# Patient Record
Sex: FEMALE | Race: OTHER | HISPANIC OR LATINO | URBAN - METROPOLITAN AREA
[De-identification: names, ages, dates, MRNs, and addresses within clinical notes are randomized per-mention and may not be internally consistent; named-entity substitution may affect disease eponyms.]

---

## 2017-12-16 ENCOUNTER — EMERGENCY (EMERGENCY)
Facility: HOSPITAL | Age: 57
LOS: 1 days | Discharge: HOME | End: 2017-12-16
Attending: EMERGENCY MEDICINE | Admitting: EMERGENCY MEDICINE
Payer: COMMERCIAL

## 2017-12-16 VITALS
OXYGEN SATURATION: 97 % | HEART RATE: 79 BPM | DIASTOLIC BLOOD PRESSURE: 87 MMHG | TEMPERATURE: 98 F | RESPIRATION RATE: 18 BRPM | SYSTOLIC BLOOD PRESSURE: 148 MMHG
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
## 2017-12-16 DIAGNOSIS — Z98.891 HISTORY OF UTERINE SCAR FROM PREVIOUS SURGERY: Chronic | ICD-10-CM

## 2017-12-16 LAB
ALBUMIN SERPL ELPH-MCNC: 4.5 G/DL — SIGNIFICANT CHANGE UP (ref 3.3–5.2)
ALP SERPL-CCNC: 85 U/L — SIGNIFICANT CHANGE UP (ref 40–120)
ALT FLD-CCNC: 48 U/L — HIGH
ANION GAP SERPL CALC-SCNC: 15 MMOL/L — SIGNIFICANT CHANGE UP (ref 5–17)
APTT BLD: 35.4 SEC — SIGNIFICANT CHANGE UP (ref 27.5–37.4)
AST SERPL-CCNC: 38 U/L — HIGH
BASE EXCESS BLDV CALC-SCNC: 2.8 MMOL/L — HIGH (ref -2–2)
BASOPHILS # BLD AUTO: 0 K/UL — SIGNIFICANT CHANGE UP (ref 0–0.2)
BASOPHILS NFR BLD AUTO: 0.1 % — SIGNIFICANT CHANGE UP (ref 0–2)
BILIRUB SERPL-MCNC: 0.3 MG/DL — LOW (ref 0.4–2)
BUN SERPL-MCNC: 13 MG/DL — SIGNIFICANT CHANGE UP (ref 8–20)
CA-I SERPL-SCNC: 1.15 MMOL/L — SIGNIFICANT CHANGE UP (ref 1.15–1.33)
CALCIUM SERPL-MCNC: 9.5 MG/DL — SIGNIFICANT CHANGE UP (ref 8.6–10.2)
CHLORIDE BLDV-SCNC: 105 MMOL/L — SIGNIFICANT CHANGE UP (ref 98–107)
CHLORIDE SERPL-SCNC: 98 MMOL/L — SIGNIFICANT CHANGE UP (ref 98–107)
CO2 SERPL-SCNC: 25 MMOL/L — SIGNIFICANT CHANGE UP (ref 22–29)
CREAT SERPL-MCNC: 0.72 MG/DL — SIGNIFICANT CHANGE UP (ref 0.5–1.3)
EOSINOPHIL # BLD AUTO: 0.2 K/UL — SIGNIFICANT CHANGE UP (ref 0–0.5)
EOSINOPHIL NFR BLD AUTO: 3 % — SIGNIFICANT CHANGE UP (ref 0–6)
GAS PNL BLDV: 142 MMOL/L — SIGNIFICANT CHANGE UP (ref 135–145)
GAS PNL BLDV: SIGNIFICANT CHANGE UP
GAS PNL BLDV: SIGNIFICANT CHANGE UP
GLUCOSE BLDV-MCNC: 206 MG/DL — HIGH (ref 70–99)
GLUCOSE SERPL-MCNC: 210 MG/DL — HIGH (ref 70–115)
HCO3 BLDV-SCNC: 26 MMOL/L — SIGNIFICANT CHANGE UP (ref 21–29)
HCT VFR BLD CALC: 39.6 % — SIGNIFICANT CHANGE UP (ref 37–47)
HCT VFR BLDA CALC: 42 — SIGNIFICANT CHANGE UP (ref 39–50)
HGB BLD CALC-MCNC: 13.6 G/DL — SIGNIFICANT CHANGE UP (ref 11.5–15.5)
HGB BLD-MCNC: 13.2 G/DL — SIGNIFICANT CHANGE UP (ref 12–16)
INR BLD: 1.05 RATIO — SIGNIFICANT CHANGE UP (ref 0.88–1.16)
LACTATE BLDV-MCNC: 2.1 MMOL/L — HIGH (ref 0.5–2)
LYMPHOCYTES # BLD AUTO: 2 K/UL — SIGNIFICANT CHANGE UP (ref 1–4.8)
LYMPHOCYTES # BLD AUTO: 26.1 % — SIGNIFICANT CHANGE UP (ref 20–55)
MCHC RBC-ENTMCNC: 29.3 PG — SIGNIFICANT CHANGE UP (ref 27–31)
MCHC RBC-ENTMCNC: 33.3 G/DL — SIGNIFICANT CHANGE UP (ref 32–36)
MCV RBC AUTO: 87.8 FL — SIGNIFICANT CHANGE UP (ref 81–99)
MONOCYTES # BLD AUTO: 0.5 K/UL — SIGNIFICANT CHANGE UP (ref 0–0.8)
MONOCYTES NFR BLD AUTO: 6.7 % — SIGNIFICANT CHANGE UP (ref 3–10)
NEUTROPHILS # BLD AUTO: 4.8 K/UL — SIGNIFICANT CHANGE UP (ref 1.8–8)
NEUTROPHILS NFR BLD AUTO: 63.7 % — SIGNIFICANT CHANGE UP (ref 37–73)
OTHER CELLS CSF MANUAL: 11 ML/DL — LOW (ref 18–22)
PCO2 BLDV: 44 MMHG — SIGNIFICANT CHANGE UP (ref 35–50)
PH BLDV: 7.42 — SIGNIFICANT CHANGE UP (ref 7.32–7.43)
PLATELET # BLD AUTO: 203 K/UL — SIGNIFICANT CHANGE UP (ref 150–400)
PO2 BLDV: 28 MMHG — SIGNIFICANT CHANGE UP (ref 25–45)
POTASSIUM BLDV-SCNC: 4.3 MMOL/L — SIGNIFICANT CHANGE UP (ref 3.4–4.5)
POTASSIUM SERPL-MCNC: 4.4 MMOL/L — SIGNIFICANT CHANGE UP (ref 3.5–5.3)
POTASSIUM SERPL-SCNC: 4.4 MMOL/L — SIGNIFICANT CHANGE UP (ref 3.5–5.3)
PROT SERPL-MCNC: 8 G/DL — SIGNIFICANT CHANGE UP (ref 6.6–8.7)
PROTHROM AB SERPL-ACNC: 11.6 SEC — SIGNIFICANT CHANGE UP (ref 9.8–12.7)
RBC # BLD: 4.51 M/UL — SIGNIFICANT CHANGE UP (ref 4.4–5.2)
RBC # FLD: 13.5 % — SIGNIFICANT CHANGE UP (ref 11–15.6)
SAO2 % BLDV: 60 % — SIGNIFICANT CHANGE UP
SODIUM SERPL-SCNC: 138 MMOL/L — SIGNIFICANT CHANGE UP (ref 135–145)
WBC # BLD: 7.6 K/UL — SIGNIFICANT CHANGE UP (ref 4.8–10.8)
WBC # FLD AUTO: 7.6 K/UL — SIGNIFICANT CHANGE UP (ref 4.8–10.8)

## 2017-12-16 PROCEDURE — 72125 CT NECK SPINE W/O DYE: CPT

## 2017-12-16 PROCEDURE — 70450 CT HEAD/BRAIN W/O DYE: CPT | Mod: 26

## 2017-12-16 PROCEDURE — 36415 COLL VENOUS BLD VENIPUNCTURE: CPT

## 2017-12-16 PROCEDURE — 99285 EMERGENCY DEPT VISIT HI MDM: CPT | Mod: 25

## 2017-12-16 PROCEDURE — 85730 THROMBOPLASTIN TIME PARTIAL: CPT

## 2017-12-16 PROCEDURE — 99284 EMERGENCY DEPT VISIT MOD MDM: CPT

## 2017-12-16 PROCEDURE — 85610 PROTHROMBIN TIME: CPT

## 2017-12-16 PROCEDURE — 84132 ASSAY OF SERUM POTASSIUM: CPT

## 2017-12-16 PROCEDURE — 82803 BLOOD GASES ANY COMBINATION: CPT

## 2017-12-16 PROCEDURE — 74177 CT ABD & PELVIS W/CONTRAST: CPT

## 2017-12-16 PROCEDURE — 85027 COMPLETE CBC AUTOMATED: CPT

## 2017-12-16 PROCEDURE — 74177 CT ABD & PELVIS W/CONTRAST: CPT | Mod: 26

## 2017-12-16 PROCEDURE — 71260 CT THORAX DX C+: CPT

## 2017-12-16 PROCEDURE — 71045 X-RAY EXAM CHEST 1 VIEW: CPT

## 2017-12-16 PROCEDURE — 82435 ASSAY OF BLOOD CHLORIDE: CPT

## 2017-12-16 PROCEDURE — 80053 COMPREHEN METABOLIC PANEL: CPT

## 2017-12-16 PROCEDURE — 71260 CT THORAX DX C+: CPT | Mod: 26

## 2017-12-16 PROCEDURE — 82330 ASSAY OF CALCIUM: CPT

## 2017-12-16 PROCEDURE — 82947 ASSAY GLUCOSE BLOOD QUANT: CPT

## 2017-12-16 PROCEDURE — 70450 CT HEAD/BRAIN W/O DYE: CPT

## 2017-12-16 PROCEDURE — 96374 THER/PROPH/DIAG INJ IV PUSH: CPT

## 2017-12-16 PROCEDURE — 84295 ASSAY OF SERUM SODIUM: CPT

## 2017-12-16 PROCEDURE — 85014 HEMATOCRIT: CPT

## 2017-12-16 PROCEDURE — 72125 CT NECK SPINE W/O DYE: CPT | Mod: 26

## 2017-12-16 PROCEDURE — 71010: CPT | Mod: 26

## 2017-12-16 PROCEDURE — 83605 ASSAY OF LACTIC ACID: CPT

## 2017-12-16 RX ORDER — SODIUM CHLORIDE 9 MG/ML
1000 INJECTION INTRAMUSCULAR; INTRAVENOUS; SUBCUTANEOUS
Qty: 0 | Refills: 0 | Status: DISCONTINUED | OUTPATIENT
Start: 2017-12-16 | End: 2017-12-20

## 2017-12-16 RX ORDER — MORPHINE SULFATE 50 MG/1
2 CAPSULE, EXTENDED RELEASE ORAL EVERY 4 HOURS
Qty: 0 | Refills: 0 | Status: DISCONTINUED | OUTPATIENT
Start: 2017-12-16 | End: 2017-12-16

## 2017-12-16 RX ADMIN — MORPHINE SULFATE 2 MILLIGRAM(S): 50 CAPSULE, EXTENDED RELEASE ORAL at 19:28

## 2017-12-16 RX ADMIN — MORPHINE SULFATE 2 MILLIGRAM(S): 50 CAPSULE, EXTENDED RELEASE ORAL at 19:43

## 2017-12-16 NOTE — H&P ADULT - NSHPLABSRESULTS_GEN_ALL_CORE
MEDICATIONS  (STANDING):  sodium chloride 0.9%. 1000 milliLiter(s) (10 mL/Hr) IV Continuous <Continuous>    MEDICATIONS  (PRN):  morphine  - Injectable 2 milliGRAM(s) IV Push every 4 hours PRN Moderate Pain (4 - 6)      Vital Signs Last 24 Hrs  T(C): 36.9 (16 Dec 2017 20:41), Max: 36.9 (16 Dec 2017 20:41)  T(F): 98.4 (16 Dec 2017 20:41), Max: 98.4 (16 Dec 2017 20:41)  HR: 79 (16 Dec 2017 20:41) (79 - 79)  BP: 148/87 (16 Dec 2017 20:41) (148/87 - 148/87)  BP(mean): --  RR: 18 (16 Dec 2017 20:41) (18 - 18)  SpO2: 97% (16 Dec 2017 20:41) (97% - 97%)    LABS:                        13.2   7.6   )-----------( 203      ( 16 Dec 2017 19:23 )             39.6     12-16    138  |  98  |  13.0  ----------------------------<  210<H>  4.4   |  25.0  |  0.72    Ca    9.5      16 Dec 2017 19:23    TPro  8.0  /  Alb  4.5  /  TBili  0.3<L>  /  DBili  x   /  AST  38<H>  /  ALT  48<H>  /  AlkPhos  85  12-16    PT/INR - ( 16 Dec 2017 19:23 )   PT: 11.6 sec;   INR: 1.05 ratio         PTT - ( 16 Dec 2017 19:23 )  PTT:35.4 sec      RADIOLOGY & ADDITIONAL STUDIES:
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
## 2017-12-16 NOTE — H&P ADULT - NSHPPHYSICALEXAM_GEN_ALL_CORE
VS: 178/99 P:83bpm   Neuro: GCS: 15 , alert, oriented, answers questions, no focal defecits  Head: atraumatic   EENT: pupils 3mm reactive   CVS: S1, S2  Chest: +BS Bl   Abd: diffuse tender, non distended   Back: cerivcal & Upper thoracic pain to palpation  Ext: no obious deformities VS: 178/99 P:83bpm   Neuro: GCS: 15 , alert, oriented, answers questions, no focal defecits  Head: atraumatic   EENT: pupils 3mm reactive   CVS: S1, S2  Chest: +BS Bl   Abd: diffuse tender, non distended   Back: cervical & Upper thoracic pain to palpation  Ext: no obvious deformities
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
## 2017-12-16 NOTE — ED PROVIDER NOTE - MUSCULOSKELETAL, MLM
Spine appears normal, range of motion is not limited, + ttp b/l lumambar paraspinal musculature no midline ttp
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
## 2017-12-18 RX ORDER — METFORMIN HYDROCHLORIDE 850 MG/1
0 TABLET ORAL
Qty: 0 | Refills: 0 | COMMUNITY

## 2018-06-11 ENCOUNTER — EMERGENCY (EMERGENCY)
Facility: HOSPITAL | Age: 58
LOS: 1 days | Discharge: HOME | End: 2018-06-11
Attending: EMERGENCY MEDICINE
Payer: MEDICARE

## 2018-06-11 VITALS — HEIGHT: 66 IN | WEIGHT: 167.99 LBS

## 2018-06-11 VITALS
SYSTOLIC BLOOD PRESSURE: 131 MMHG | HEART RATE: 72 BPM | OXYGEN SATURATION: 97 % | RESPIRATION RATE: 18 BRPM | DIASTOLIC BLOOD PRESSURE: 86 MMHG | TEMPERATURE: 98 F

## 2018-06-11 DIAGNOSIS — Z98.891 HISTORY OF UTERINE SCAR FROM PREVIOUS SURGERY: Chronic | ICD-10-CM

## 2018-06-11 LAB
ALBUMIN SERPL ELPH-MCNC: 4.6 G/DL — SIGNIFICANT CHANGE UP (ref 3.3–5.2)
ALP SERPL-CCNC: 81 U/L — SIGNIFICANT CHANGE UP (ref 40–120)
ALT FLD-CCNC: 49 U/L — HIGH
ANION GAP SERPL CALC-SCNC: 14 MMOL/L — SIGNIFICANT CHANGE UP (ref 5–17)
APPEARANCE UR: CLEAR — SIGNIFICANT CHANGE UP
APTT BLD: 35.8 SEC — SIGNIFICANT CHANGE UP (ref 27.5–37.4)
AST SERPL-CCNC: 51 U/L — HIGH
BACTERIA # UR AUTO: ABNORMAL
BASOPHILS # BLD AUTO: 0 K/UL — SIGNIFICANT CHANGE UP (ref 0–0.2)
BASOPHILS NFR BLD AUTO: 0.3 % — SIGNIFICANT CHANGE UP (ref 0–2)
BILIRUB SERPL-MCNC: 0.4 MG/DL — SIGNIFICANT CHANGE UP (ref 0.4–2)
BILIRUB UR-MCNC: NEGATIVE — SIGNIFICANT CHANGE UP
BUN SERPL-MCNC: 20 MG/DL — SIGNIFICANT CHANGE UP (ref 8–20)
CALCIUM SERPL-MCNC: 10.5 MG/DL — HIGH (ref 8.6–10.2)
CHLORIDE SERPL-SCNC: 100 MMOL/L — SIGNIFICANT CHANGE UP (ref 98–107)
CO2 SERPL-SCNC: 26 MMOL/L — SIGNIFICANT CHANGE UP (ref 22–29)
COLOR SPEC: YELLOW — SIGNIFICANT CHANGE UP
CREAT SERPL-MCNC: 0.82 MG/DL — SIGNIFICANT CHANGE UP (ref 0.5–1.3)
D DIMER BLD IA.RAPID-MCNC: <150 NG/ML DDU — SIGNIFICANT CHANGE UP
DIFF PNL FLD: NEGATIVE — SIGNIFICANT CHANGE UP
EOSINOPHIL # BLD AUTO: 0.3 K/UL — SIGNIFICANT CHANGE UP (ref 0–0.5)
EOSINOPHIL NFR BLD AUTO: 3.8 % — SIGNIFICANT CHANGE UP (ref 0–6)
EPI CELLS # UR: SIGNIFICANT CHANGE UP
GLUCOSE SERPL-MCNC: 116 MG/DL — HIGH (ref 70–115)
GLUCOSE UR QL: 1000 MG/DL
HCT VFR BLD CALC: 42 % — SIGNIFICANT CHANGE UP (ref 37–47)
HGB BLD-MCNC: 13.8 G/DL — SIGNIFICANT CHANGE UP (ref 12–16)
INR BLD: 1.02 RATIO — SIGNIFICANT CHANGE UP (ref 0.88–1.16)
KETONES UR-MCNC: NEGATIVE — SIGNIFICANT CHANGE UP
LEUKOCYTE ESTERASE UR-ACNC: ABNORMAL
LYMPHOCYTES # BLD AUTO: 2.5 K/UL — SIGNIFICANT CHANGE UP (ref 1–4.8)
LYMPHOCYTES # BLD AUTO: 31.9 % — SIGNIFICANT CHANGE UP (ref 20–55)
MCHC RBC-ENTMCNC: 28.8 PG — SIGNIFICANT CHANGE UP (ref 27–31)
MCHC RBC-ENTMCNC: 32.9 G/DL — SIGNIFICANT CHANGE UP (ref 32–36)
MCV RBC AUTO: 87.7 FL — SIGNIFICANT CHANGE UP (ref 81–99)
MONOCYTES # BLD AUTO: 0.6 K/UL — SIGNIFICANT CHANGE UP (ref 0–0.8)
MONOCYTES NFR BLD AUTO: 7.3 % — SIGNIFICANT CHANGE UP (ref 3–10)
NEUTROPHILS # BLD AUTO: 4.4 K/UL — SIGNIFICANT CHANGE UP (ref 1.8–8)
NEUTROPHILS NFR BLD AUTO: 56.4 % — SIGNIFICANT CHANGE UP (ref 37–73)
NITRITE UR-MCNC: POSITIVE
NT-PROBNP SERPL-SCNC: 10 PG/ML — SIGNIFICANT CHANGE UP (ref 0–300)
PH UR: 6 — SIGNIFICANT CHANGE UP (ref 5–8)
PLATELET # BLD AUTO: 248 K/UL — SIGNIFICANT CHANGE UP (ref 150–400)
POTASSIUM SERPL-MCNC: 4.9 MMOL/L — SIGNIFICANT CHANGE UP (ref 3.5–5.3)
POTASSIUM SERPL-SCNC: 4.9 MMOL/L — SIGNIFICANT CHANGE UP (ref 3.5–5.3)
PROT SERPL-MCNC: 8.3 G/DL — SIGNIFICANT CHANGE UP (ref 6.6–8.7)
PROT UR-MCNC: 15 MG/DL
PROTHROM AB SERPL-ACNC: 11.2 SEC — SIGNIFICANT CHANGE UP (ref 9.8–12.7)
RBC # BLD: 4.79 M/UL — SIGNIFICANT CHANGE UP (ref 4.4–5.2)
RBC # FLD: 13.8 % — SIGNIFICANT CHANGE UP (ref 11–15.6)
RBC CASTS # UR COMP ASSIST: SIGNIFICANT CHANGE UP /HPF (ref 0–4)
SODIUM SERPL-SCNC: 140 MMOL/L — SIGNIFICANT CHANGE UP (ref 135–145)
SP GR SPEC: 1.01 — SIGNIFICANT CHANGE UP (ref 1.01–1.02)
TROPONIN T SERPL-MCNC: <0.01 NG/ML — SIGNIFICANT CHANGE UP (ref 0–0.06)
TROPONIN T SERPL-MCNC: <0.01 NG/ML — SIGNIFICANT CHANGE UP (ref 0–0.06)
UROBILINOGEN FLD QL: NEGATIVE MG/DL — SIGNIFICANT CHANGE UP
WBC # BLD: 7.7 K/UL — SIGNIFICANT CHANGE UP (ref 4.8–10.8)
WBC # FLD AUTO: 7.7 K/UL — SIGNIFICANT CHANGE UP (ref 4.8–10.8)
WBC UR QL: ABNORMAL

## 2018-06-11 PROCEDURE — 85379 FIBRIN DEGRADATION QUANT: CPT

## 2018-06-11 PROCEDURE — 96374 THER/PROPH/DIAG INJ IV PUSH: CPT

## 2018-06-11 PROCEDURE — 83690 ASSAY OF LIPASE: CPT

## 2018-06-11 PROCEDURE — 71046 X-RAY EXAM CHEST 2 VIEWS: CPT

## 2018-06-11 PROCEDURE — 36415 COLL VENOUS BLD VENIPUNCTURE: CPT

## 2018-06-11 PROCEDURE — 83880 ASSAY OF NATRIURETIC PEPTIDE: CPT

## 2018-06-11 PROCEDURE — 96375 TX/PRO/DX INJ NEW DRUG ADDON: CPT

## 2018-06-11 PROCEDURE — 93010 ELECTROCARDIOGRAM REPORT: CPT

## 2018-06-11 PROCEDURE — 99285 EMERGENCY DEPT VISIT HI MDM: CPT

## 2018-06-11 PROCEDURE — 85730 THROMBOPLASTIN TIME PARTIAL: CPT

## 2018-06-11 PROCEDURE — 71046 X-RAY EXAM CHEST 2 VIEWS: CPT | Mod: 26

## 2018-06-11 PROCEDURE — 87186 SC STD MICRODIL/AGAR DIL: CPT

## 2018-06-11 PROCEDURE — 99284 EMERGENCY DEPT VISIT MOD MDM: CPT | Mod: 25

## 2018-06-11 PROCEDURE — 85027 COMPLETE CBC AUTOMATED: CPT

## 2018-06-11 PROCEDURE — 84484 ASSAY OF TROPONIN QUANT: CPT

## 2018-06-11 PROCEDURE — 87086 URINE CULTURE/COLONY COUNT: CPT

## 2018-06-11 PROCEDURE — 80053 COMPREHEN METABOLIC PANEL: CPT

## 2018-06-11 PROCEDURE — 81001 URINALYSIS AUTO W/SCOPE: CPT

## 2018-06-11 PROCEDURE — 85610 PROTHROMBIN TIME: CPT

## 2018-06-11 PROCEDURE — 93005 ELECTROCARDIOGRAM TRACING: CPT

## 2018-06-11 RX ORDER — FAMOTIDINE 10 MG/ML
1 INJECTION INTRAVENOUS
Qty: 20 | Refills: 0 | OUTPATIENT
Start: 2018-06-11 | End: 2018-06-20

## 2018-06-11 RX ORDER — FAMOTIDINE 10 MG/ML
20 INJECTION INTRAVENOUS ONCE
Qty: 0 | Refills: 0 | Status: COMPLETED | OUTPATIENT
Start: 2018-06-11 | End: 2018-06-11

## 2018-06-11 RX ORDER — IBUPROFEN 200 MG
600 TABLET ORAL ONCE
Qty: 0 | Refills: 0 | Status: COMPLETED | OUTPATIENT
Start: 2018-06-11 | End: 2018-06-11

## 2018-06-11 RX ORDER — CEPHALEXIN 500 MG
1 CAPSULE ORAL
Qty: 20 | Refills: 0 | OUTPATIENT
Start: 2018-06-11 | End: 2018-06-20

## 2018-06-11 RX ORDER — CEFTRIAXONE 500 MG/1
1 INJECTION, POWDER, FOR SOLUTION INTRAMUSCULAR; INTRAVENOUS ONCE
Qty: 0 | Refills: 0 | Status: COMPLETED | OUTPATIENT
Start: 2018-06-11 | End: 2018-06-11

## 2018-06-11 RX ADMIN — Medication 600 MILLIGRAM(S): at 21:46

## 2018-06-11 RX ADMIN — FAMOTIDINE 20 MILLIGRAM(S): 10 INJECTION INTRAVENOUS at 17:26

## 2018-06-11 RX ADMIN — FAMOTIDINE 20 MILLIGRAM(S): 10 INJECTION INTRAVENOUS at 16:15

## 2018-06-11 RX ADMIN — CEFTRIAXONE 100 GRAM(S): 500 INJECTION, POWDER, FOR SOLUTION INTRAMUSCULAR; INTRAVENOUS at 20:26

## 2018-06-11 NOTE — ED ADULT NURSE NOTE - PMH
Diabetes    Diverticulitis    DM (diabetes mellitus)    High cholesterol    HTN (hypertension)    Hyperlipidemia    Hypertension    Liver disease  (unable to take tylenol )

## 2018-06-11 NOTE — ED PROVIDER NOTE - MEDICAL DECISION MAKING DETAILS
Pt is a 57yoF prsenting with LUQ, L flank, and chest pain with malaise x 3 days with associated urinary symptoms.   + CVAT on exam; EKG wnl;  will check troponin x 2, d-dimer, monitor on telemetry, and check for possible UTI/pyelonephritis.  IF negative troponins and chest pain improved, based on low risk HEART score will d/c with further workup by cardiology

## 2018-06-11 NOTE — ED PROVIDER NOTE - OBJECTIVE STATEMENT
57yoF with HTN c/o feeling very tired and having abdominal pain;  she reports 3 days of feeling this sensation.   Pain is in LUQ as well as L side of chest.  Pt denies this type of pain previously. She denies CAD.  Pt reports pain radiates to bilateral arms and she reports some numbness to b/l hands.  She endorses SOB.   Pt denies recent travel, states that she had swelling in the feet but it resolves with her BP meds.  Pain in chest is coming and going;  when it comes it last about 10 minutes.  Pt does not take aspirin and did not take any today.  + CAD in both parents without h/o early MI    Meds: losartan, metformin  PCP:  Dr. Pereira  Cardiologist: ? [place] [facility] 57yoF with HTN c/o feeling very tired and having abdominal pain;  she reports 3 days of feeling this sensation.   Pain is in LUQ as well as L side of chest.  Pt denies this type of pain previously. She denies CAD.  Pt reports pain radiates to bilateral arms and she reports some numbness to b/l hands from time to time.  She endorses SOB.   Pt denies recent travel, states that she had swelling in the feet but it resolves with her BP meds.  Pain in chest is coming and going;  when it comes it last about 10 minutes.  Pt does not take aspirin and did not take any today.  + CAD in both parents without h/o early MI. On ROS patient endorses dysuria, urgency, frequency.    Meds: losartan, metformin  PCP:  Dr. Pereira  Cardiologist: ? [facility]-pt cannot remember even when shown the pictures of staff

## 2018-06-11 NOTE — ED PROVIDER NOTE - PROGRESS NOTE DETAILS
PT states that chest pain has resolved. SHe is feeling better I discussed w/ her outpt cardiology f/u, return precautions, and treatment for pyelonephritis.  SHe feels comfortable w/ plan and says she will f/u

## 2018-06-11 NOTE — ED PROVIDER NOTE - CARDIAC, MLM
Normal rate, regular rhythm.  Heart sounds S1, S2.  No murmurs, rubs or gallops. NO LE edema/ calf tenderness.

## 2018-06-11 NOTE — ED STATDOCS - OBJECTIVE STATEMENT
56 y/o F presents with 3 day hx of L sided chest pain with SOB, fatigue and abdominal pain.   Will transfer to Main dept for  cardiac monitoring.  Preliminary orders in computer.

## 2018-06-11 NOTE — ED ADULT NURSE REASSESSMENT NOTE - NS ED NURSE REASSESS COMMENT FT1
Assumed pt care at 1650.  Pt a&ox3 c/o being increasingly tired along with abdominal pain x 3 days.  Pt also repeating intermittent chest pain, denies any at this time.  Pt c/o b/l LE edema that also started 3 days ago.  No acute s/s of respiratory distress noted or reported, will continue to monitor

## 2019-08-02 PROBLEM — E11.9 TYPE 2 DIABETES MELLITUS WITHOUT COMPLICATIONS: Chronic | Status: ACTIVE | Noted: 2017-12-16

## 2019-08-02 PROBLEM — I10 ESSENTIAL (PRIMARY) HYPERTENSION: Chronic | Status: ACTIVE | Noted: 2017-12-16

## 2019-08-02 PROBLEM — K76.9 LIVER DISEASE, UNSPECIFIED: Chronic | Status: ACTIVE | Noted: 2017-12-16

## 2019-08-02 PROBLEM — E78.5 HYPERLIPIDEMIA, UNSPECIFIED: Chronic | Status: ACTIVE | Noted: 2017-12-16

## 2019-08-20 ENCOUNTER — APPOINTMENT (OUTPATIENT)
Dept: GASTROENTEROLOGY | Facility: CLINIC | Age: 59
End: 2019-08-20
Payer: MEDICARE

## 2019-08-20 VITALS
DIASTOLIC BLOOD PRESSURE: 89 MMHG | BODY MASS INDEX: 27.32 KG/M2 | OXYGEN SATURATION: 98 % | HEART RATE: 79 BPM | HEIGHT: 66 IN | SYSTOLIC BLOOD PRESSURE: 147 MMHG | WEIGHT: 170 LBS | RESPIRATION RATE: 16 BRPM

## 2019-08-20 PROCEDURE — 99204 OFFICE O/P NEW MOD 45 MIN: CPT

## 2019-08-20 RX ORDER — ALBUTEROL 90 MCG
AEROSOL (GRAM) INHALATION
Refills: 0 | Status: ACTIVE | COMMUNITY

## 2019-08-20 RX ORDER — GLIPIZIDE 5 MG/1
5 TABLET ORAL
Refills: 0 | Status: ACTIVE | COMMUNITY

## 2019-08-20 RX ORDER — AMLODIPINE BESYLATE 5 MG/1
5 TABLET ORAL
Refills: 0 | Status: ACTIVE | COMMUNITY

## 2019-08-20 RX ORDER — ALENDRONATE SODIUM 70 MG/1
70 TABLET ORAL
Refills: 0 | Status: ACTIVE | COMMUNITY

## 2019-08-20 RX ORDER — METFORMIN HYDROCHLORIDE 1000 MG/1
1000 TABLET, COATED ORAL
Refills: 0 | Status: ACTIVE | COMMUNITY

## 2019-08-20 RX ORDER — SIMVASTATIN 20 MG/1
20 TABLET, FILM COATED ORAL
Refills: 0 | Status: ACTIVE | COMMUNITY

## 2019-08-20 RX ORDER — FLUTICASONE PROPIONATE 50 MCG
50 SPRAY, SUSPENSION NASAL
Refills: 0 | Status: ACTIVE | COMMUNITY

## 2019-08-20 NOTE — HISTORY OF PRESENT ILLNESS
[de-identified] : During the past 3 years she has been troubled by recurrent epigastric burning pain that occurs after eating as well as heartburn which resolves as long as she takes omeprazole 40 mg p.o. q.a.m. When symptoms first began she underwent upper endoscopy and colonoscopy by a gastroenterologist in [place] the name of them she does not recall. The patient is unaware as to the results but does not believe anything significant was found. If omeprazole was discontinued the epigastric burning pain and heartburn recur. There is no nausea or vomiting. There is no diarrhea or constipation. There is no rectal bleeding or melena. Her appetite weight is stable. There is no dysphagia. There is no family history of colon cancer.

## 2019-08-20 NOTE — PHYSICAL EXAM
[General Appearance - Alert] : alert [General Appearance - In No Acute Distress] : in no acute distress [General Appearance - Well Nourished] : well nourished [General Appearance - Well Developed] : well developed [General Appearance - Well-Appearing] : healthy appearing [Sclera] : the sclera and conjunctiva were normal [PERRL With Normal Accommodation] : pupils were equal in size, round, and reactive to light [Extraocular Movements] : extraocular movements were intact [Outer Ear] : the ears and nose were normal in appearance [Hearing Threshold Finger Rub Not [place]] : hearing was normal [Examination Of The Oral Cavity] : the lips and gums were normal [Oropharynx] : the oropharynx was normal [Neck Appearance] : the appearance of the neck was normal [Auscultation Breath Sounds / Voice Sounds] : lungs were clear to auscultation bilaterally [Heart Sounds] : normal S1 and S2 [Heart Rate And Rhythm] : heart rate was normal and rhythm regular [Murmurs] : no murmurs [Heart Sounds Gallop] : no gallops [Heart Sounds Pericardial Friction Rub] : no pericardial rub [Bowel Sounds] : normal bowel sounds [Abdomen Soft] : soft [Abdomen Tenderness] : non-tender [Abdomen Mass (___ Cm)] : no abdominal mass palpated [No CVA Tenderness] : no ~M costovertebral angle tenderness [No Spinal Tenderness] : no spinal tenderness [Nail Clubbing] : no clubbing  or cyanosis of the fingernails [Abnormal Walk] : normal gait [Musculoskeletal - Swelling] : no joint swelling seen [Skin Color & Pigmentation] : normal skin color and pigmentation [Motor Tone] : muscle strength and tone were normal [Skin Turgor] : normal skin turgor [] : no rash [No Focal Deficits] : no focal deficits [Motor Exam] : the motor exam was normal [Oriented To Time, Place, And Person] : oriented to person, place, and time [Impaired Insight] : insight and judgment were intact [Affect] : the affect was normal [FreeTextEntry1] : overweight

## 2019-08-20 NOTE — ASSESSMENT
[FreeTextEntry1] : The patient probably has gastroesophageal reflux without esophagitis and I simply recommended that she take omeprazole 40 mg p.o. every morning. In the meantime she'll undergo followup endoscopy. I requested that she obtain the results of a prior endoscopy and colonoscopy for my review. She will also undergo a CBC as well as comprehensive metabolic profile to include celiac antibodies and a C-reactive protein. The risks, benefits, complications and possible adverse consequences associated with upper endoscopy were discussed with the patient.\par

## 2019-12-30 LAB
ALBUMIN SERPL ELPH-MCNC: 4.4 G/DL
ALP BLD-CCNC: 57 U/L
ALT SERPL-CCNC: 32 U/L
ANION GAP SERPL CALC-SCNC: 12 MMOL/L
AST SERPL-CCNC: 28 U/L
BASOPHILS # BLD AUTO: 0.04 K/UL
BASOPHILS NFR BLD AUTO: 0.5 %
BILIRUB SERPL-MCNC: 0.3 MG/DL
BUN SERPL-MCNC: 9 MG/DL
CALCIUM SERPL-MCNC: 9.9 MG/DL
CHLORIDE SERPL-SCNC: 102 MMOL/L
CO2 SERPL-SCNC: 26 MMOL/L
CREAT SERPL-MCNC: 0.72 MG/DL
ENDOMYSIUM IGA SER QL: NEGATIVE
ENDOMYSIUM IGA TITR SER: NORMAL
EOSINOPHIL # BLD AUTO: 0.55 K/UL
EOSINOPHIL NFR BLD AUTO: 6.8 %
GLIADIN IGA SER QL: 5.4 UNITS
GLIADIN IGG SER QL: <5 UNITS
GLIADIN PEPTIDE IGA SER-ACNC: NEGATIVE
GLIADIN PEPTIDE IGG SER-ACNC: NEGATIVE
GLUCOSE SERPL-MCNC: 128 MG/DL
HCT VFR BLD CALC: 39.1 %
HGB BLD-MCNC: 12.2 G/DL
IGA SER QL IEP: 269 MG/DL
IMM GRANULOCYTES NFR BLD AUTO: 0.4 %
INR PPP: 0.98 RATIO
LYMPHOCYTES # BLD AUTO: 2.49 K/UL
LYMPHOCYTES NFR BLD AUTO: 31 %
MAN DIFF?: NORMAL
MCHC RBC-ENTMCNC: 28.4 PG
MCHC RBC-ENTMCNC: 31.2 GM/DL
MCV RBC AUTO: 91.1 FL
MONOCYTES # BLD AUTO: 0.43 K/UL
MONOCYTES NFR BLD AUTO: 5.4 %
NEUTROPHILS # BLD AUTO: 4.49 K/UL
NEUTROPHILS NFR BLD AUTO: 55.9 %
PLATELET # BLD AUTO: 223 K/UL
POTASSIUM SERPL-SCNC: 4.8 MMOL/L
PROT SERPL-MCNC: 7.2 G/DL
PT BLD: 11.2 SEC
RBC # BLD: 4.29 M/UL
RBC # FLD: 14.1 %
SODIUM SERPL-SCNC: 140 MMOL/L
TTG IGA SER IA-ACNC: <1.2 U/ML
TTG IGA SER-ACNC: NEGATIVE
TTG IGG SER IA-ACNC: 3.5 U/ML
TTG IGG SER IA-ACNC: NEGATIVE
WBC # FLD AUTO: 8.03 K/UL

## 2019-12-31 ENCOUNTER — RESULT REVIEW (OUTPATIENT)
Age: 59
End: 2019-12-31

## 2019-12-31 ENCOUNTER — APPOINTMENT (OUTPATIENT)
Dept: GASTROENTEROLOGY | Facility: GI CENTER | Age: 59
End: 2019-12-31
Payer: MEDICARE

## 2019-12-31 ENCOUNTER — OUTPATIENT (OUTPATIENT)
Dept: OUTPATIENT SERVICES | Facility: HOSPITAL | Age: 59
LOS: 1 days | End: 2019-12-31
Payer: MEDICARE

## 2019-12-31 DIAGNOSIS — Z98.891 HISTORY OF UTERINE SCAR FROM PREVIOUS SURGERY: Chronic | ICD-10-CM

## 2019-12-31 DIAGNOSIS — K21.9 GASTRO-ESOPHAGEAL REFLUX DISEASE WITHOUT ESOPHAGITIS: ICD-10-CM

## 2019-12-31 LAB — GLUCOSE BLDC GLUCOMTR-MCNC: 133 MG/DL — HIGH (ref 70–99)

## 2019-12-31 PROCEDURE — 88342 IMHCHEM/IMCYTCHM 1ST ANTB: CPT

## 2019-12-31 PROCEDURE — 88342 IMHCHEM/IMCYTCHM 1ST ANTB: CPT | Mod: 26

## 2019-12-31 PROCEDURE — 43239 EGD BIOPSY SINGLE/MULTIPLE: CPT

## 2019-12-31 PROCEDURE — 82962 GLUCOSE BLOOD TEST: CPT

## 2019-12-31 PROCEDURE — 88305 TISSUE EXAM BY PATHOLOGIST: CPT

## 2019-12-31 PROCEDURE — 88305 TISSUE EXAM BY PATHOLOGIST: CPT | Mod: 26

## 2019-12-31 NOTE — PHYSICAL EXAM
[General Appearance - Alert] : alert [General Appearance - In No Acute Distress] : in no acute distress [General Appearance - Well Nourished] : well nourished [General Appearance - Well Developed] : well developed [General Appearance - Well-Appearing] : healthy appearing [FreeTextEntry1] : overweight [Sclera] : the sclera and conjunctiva were normal [PERRL With Normal Accommodation] : pupils were equal in size, round, and reactive to light [Extraocular Movements] : extraocular movements were intact [Hearing Threshold Finger Rub Not [place]] : hearing was normal [Outer Ear] : the ears and nose were normal in appearance [Examination Of The Oral Cavity] : the lips and gums were normal [Oropharynx] : the oropharynx was normal [Neck Appearance] : the appearance of the neck was normal [Heart Rate And Rhythm] : heart rate was normal and rhythm regular [Auscultation Breath Sounds / Voice Sounds] : lungs were clear to auscultation bilaterally [Heart Sounds] : normal S1 and S2 [Heart Sounds Gallop] : no gallops [Murmurs] : no murmurs [Heart Sounds Pericardial Friction Rub] : no pericardial rub [Bowel Sounds] : normal bowel sounds [Abdomen Tenderness] : non-tender [Abdomen Soft] : soft [Abdomen Mass (___ Cm)] : no abdominal mass palpated [No Spinal Tenderness] : no spinal tenderness [No CVA Tenderness] : no ~M costovertebral angle tenderness [Nail Clubbing] : no clubbing  or cyanosis of the fingernails [Abnormal Walk] : normal gait [Musculoskeletal - Swelling] : no joint swelling seen [Motor Tone] : muscle strength and tone were normal [Skin Color & Pigmentation] : normal skin color and pigmentation [Skin Turgor] : normal skin turgor [] : no rash [Motor Exam] : the motor exam was normal [No Focal Deficits] : no focal deficits [Oriented To Time, Place, And Person] : oriented to person, place, and time [Impaired Insight] : insight and judgment were intact [Affect] : the affect was normal

## 2019-12-31 NOTE — PROCEDURE
[Epigastric Pain] : epigastric pain [Procedure Explained] : The procedure was explained [GERD] : GERD [Allergies Reviewed] : allergies reviewed. [Risks] : Risks [Alternatives] : alternatives [Benefits] : benefits [Consent Obtained] : written consent was obtained prior to the procedure and is detailed in the patient's record [Patient] : the patient [Cardiac Monitor] : cardiac monitor [Automated Blood Pressure Cuff] : automated blood pressure cuff [Pulse Oximeter] : pulse oximeter [With Biopsy] : with biopsy [Versed ___ mg IV] : Versed [unfilled] ~Umg intravenously [2] : 2 [Propofol ___ mg IV] : Propofol [unfilled] ~Umg intravenously [Erythema] : erythema [Normal] : Normal [Sent to Pathology] : was sent to pathology for analysis [Vital Signs Stable] : the vital signs were stable [Tolerated Well] : the patient tolerated the procedure well [de-identified] : [id number], ketamine 10mg [de-identified] : patchy and streaky erythema, mild, biopsy taken for H Pylori [de-identified] : biopsy taken for H Pylori [de-identified] : She will continue omeprazole and try again to get her prior colonoscopy report for my review.

## 2019-12-31 NOTE — PROCEDURE
[Epigastric Pain] : epigastric pain [GERD] : GERD [Allergies Reviewed] : allergies reviewed. [Procedure Explained] : The procedure was explained [Risks] : Risks [Consent Obtained] : written consent was obtained prior to the procedure and is detailed in the patient's record [Benefits] : benefits [Alternatives] : alternatives [Patient] : the patient [Cardiac Monitor] : cardiac monitor [Automated Blood Pressure Cuff] : automated blood pressure cuff [Pulse Oximeter] : pulse oximeter [With Biopsy] : with biopsy [Versed ___ mg IV] : Versed [unfilled] ~Umg intravenously [Propofol ___ mg IV] : Propofol [unfilled] ~Umg intravenously [2] : 2 [Erythema] : erythema [Normal] : Normal [Sent to Pathology] : was sent to pathology for analysis [Vital Signs Stable] : the vital signs were stable [Tolerated Well] : the patient tolerated the procedure well [de-identified] : [id number], ketamine 10mg [de-identified] : biopsy taken for H Pylori [de-identified] : She will continue omeprazole and try again to get her prior colonoscopy report for my review. [de-identified] : patchy and streaky erythema, mild, biopsy taken for H Pylori

## 2019-12-31 NOTE — PHYSICAL EXAM
[General Appearance - Alert] : alert [General Appearance - In No Acute Distress] : in no acute distress [General Appearance - Well Nourished] : well nourished [General Appearance - Well Developed] : well developed [General Appearance - Well-Appearing] : healthy appearing [FreeTextEntry1] : overweight [Sclera] : the sclera and conjunctiva were normal [PERRL With Normal Accommodation] : pupils were equal in size, round, and reactive to light [Extraocular Movements] : extraocular movements were intact [Hearing Threshold Finger Rub Not [place]] : hearing was normal [Outer Ear] : the ears and nose were normal in appearance [Oropharynx] : the oropharynx was normal [Examination Of The Oral Cavity] : the lips and gums were normal [Neck Appearance] : the appearance of the neck was normal [Auscultation Breath Sounds / Voice Sounds] : lungs were clear to auscultation bilaterally [Heart Rate And Rhythm] : heart rate was normal and rhythm regular [Heart Sounds] : normal S1 and S2 [Heart Sounds Gallop] : no gallops [Murmurs] : no murmurs [Heart Sounds Pericardial Friction Rub] : no pericardial rub [Bowel Sounds] : normal bowel sounds [Abdomen Soft] : soft [Abdomen Tenderness] : non-tender [Abdomen Mass (___ Cm)] : no abdominal mass palpated [No Spinal Tenderness] : no spinal tenderness [No CVA Tenderness] : no ~M costovertebral angle tenderness [Abnormal Walk] : normal gait [Nail Clubbing] : no clubbing  or cyanosis of the fingernails [Musculoskeletal - Swelling] : no joint swelling seen [Motor Tone] : muscle strength and tone were normal [Skin Turgor] : normal skin turgor [Skin Color & Pigmentation] : normal skin color and pigmentation [No Focal Deficits] : no focal deficits [] : no rash [Motor Exam] : the motor exam was normal [Oriented To Time, Place, And Person] : oriented to person, place, and time [Impaired Insight] : insight and judgment were intact [Affect] : the affect was normal

## 2020-01-01 ENCOUNTER — APPOINTMENT (OUTPATIENT)
Dept: GASTROENTEROLOGY | Facility: CLINIC | Age: 60
End: 2020-01-01
Payer: MEDICARE

## 2020-01-01 PROCEDURE — 99442: CPT

## 2020-01-01 RX ORDER — OMEPRAZOLE 20 MG/1
20 CAPSULE, DELAYED RELEASE ORAL
Qty: 30 | Refills: 5 | Status: ACTIVE | COMMUNITY
Start: 2020-01-01 | End: 1900-01-01

## 2020-01-01 RX ORDER — HYOSCYAMINE SULFATE 0.38 MG/1
0.38 TABLET, EXTENDED RELEASE ORAL
Qty: 60 | Refills: 5 | Status: ACTIVE | COMMUNITY
Start: 2020-01-01 | End: 1900-01-01

## 2020-01-07 LAB — SURGICAL PATHOLOGY STUDY: SIGNIFICANT CHANGE UP

## 2020-02-07 ENCOUNTER — APPOINTMENT (OUTPATIENT)
Dept: GASTROENTEROLOGY | Facility: CLINIC | Age: 60
End: 2020-02-07
Payer: MEDICARE

## 2020-02-07 VITALS
SYSTOLIC BLOOD PRESSURE: 124 MMHG | HEART RATE: 78 BPM | WEIGHT: 166 LBS | OXYGEN SATURATION: 99 % | RESPIRATION RATE: 14 BRPM | DIASTOLIC BLOOD PRESSURE: 80 MMHG | BODY MASS INDEX: 26.68 KG/M2 | HEIGHT: 66 IN

## 2020-02-07 DIAGNOSIS — K58.0 IRRITABLE BOWEL SYNDROME WITH DIARRHEA: ICD-10-CM

## 2020-02-07 DIAGNOSIS — K21.9 GASTRO-ESOPHAGEAL REFLUX DISEASE W/OUT ESOPHAGITIS: ICD-10-CM

## 2020-02-07 DIAGNOSIS — G89.29 EPIGASTRIC PAIN: ICD-10-CM

## 2020-02-07 DIAGNOSIS — R10.13 EPIGASTRIC PAIN: ICD-10-CM

## 2020-02-07 PROCEDURE — 99214 OFFICE O/P EST MOD 30 MIN: CPT

## 2020-02-07 RX ORDER — HYOSCYAMINE SULFATE 0.38 MG/1
0.38 TABLET, EXTENDED RELEASE ORAL
Qty: 60 | Refills: 5 | Status: ACTIVE | COMMUNITY
Start: 2020-02-07 | End: 1900-01-01

## 2020-02-07 RX ORDER — OMEPRAZOLE 20 MG/1
20 CAPSULE, DELAYED RELEASE ORAL
Qty: 90 | Refills: 3 | Status: ACTIVE | COMMUNITY
Start: 2020-02-07 | End: 1900-01-01

## 2020-02-07 NOTE — PHYSICAL EXAM
[General Appearance - Alert] : alert [General Appearance - In No Acute Distress] : in no acute distress [General Appearance - Well Developed] : well developed [General Appearance - Well-Appearing] : healthy appearing [General Appearance - Well Nourished] : well nourished [Sclera] : the sclera and conjunctiva were normal [PERRL With Normal Accommodation] : pupils were equal in size, round, and reactive to light [Extraocular Movements] : extraocular movements were intact [Hearing Threshold Finger Rub Not [place]] : hearing was normal [Examination Of The Oral Cavity] : the lips and gums were normal [Outer Ear] : the ears and nose were normal in appearance [Neck Appearance] : the appearance of the neck was normal [Oropharynx] : the oropharynx was normal [Heart Rate And Rhythm] : heart rate was normal and rhythm regular [Abdomen Soft] : soft [Abdomen Tenderness] : non-tender [Bowel Sounds] : normal bowel sounds [No Spinal Tenderness] : no spinal tenderness [Abdomen Mass (___ Cm)] : no abdominal mass palpated [No CVA Tenderness] : no ~M costovertebral angle tenderness [Nail Clubbing] : no clubbing  or cyanosis of the fingernails [Abnormal Walk] : normal gait [Musculoskeletal - Swelling] : no joint swelling seen [Skin Color & Pigmentation] : normal skin color and pigmentation [Motor Tone] : muscle strength and tone were normal [Skin Turgor] : normal skin turgor [] : no rash [No Focal Deficits] : no focal deficits [Oriented To Time, Place, And Person] : oriented to person, place, and time [Motor Exam] : the motor exam was normal [Impaired Insight] : insight and judgment were intact [Affect] : the affect was normal [FreeTextEntry1] : overweight

## 2020-02-07 NOTE — HISTORY OF PRESENT ILLNESS
[de-identified] : During the past 3-4 years she has been experiencing recurring epigastric burning pain that occurs after eating as well as intermittent heartburn that resolves as long as she takes omeprazole. The symptoms first began she underwent upper endoscopy and colonoscopy by a gastroenterologist in [place] then performed she does not recall. She is unaware as to the results but does not believe anything significant was found. If he omeprazole is discontinue the epigastric burning pain and heartburn recur. There is no nausea or vomiting. There is no family history of colon cancer. On December 31, 2019 I performed an upper endoscopy that was essentially normal with the exception of some insignificant patchy antral erythema the biopsies of which were unremarkable. There were no H. pylori organisms. A CBC and comprehensive metabolic panel as well as celiac antibodies he will either negative or normal. Since that exam she continues to take omeprazole 20 mg p.o. q.a.m. and denies any heartburn or epigastric pain. There is no nausea or vomiting. Thus far she has been unable to get the report of her prior colonoscopy. She now notes that for the past 2 or 3 years she experiences postprandial lower abdominal cramping, urgency and loose bowel movement. She says that the prior colonoscopy was performed for evaluation of these symptoms.

## 2020-02-07 NOTE — ASSESSMENT
[FreeTextEntry1] : The patient essentially has some element of nonulcer dyspepsia as well as gastroesophageal reflux without esophagitis but responds to omeprazole 20 mg p.o. q.a.m. on which she should continue. It also appears that she has irritable bowel syndrome with a diarrhea-type. She will start hyoscyamine 0.375 mg p.o. b.i.d. In the meantime she will go to the office with a gastroenterologist who performed the last colonoscopy and obtain a copy of the report for my review. She'll be seen again in 2 months for further followup.

## 2021-01-01 ENCOUNTER — INPATIENT (INPATIENT)
Facility: HOSPITAL | Age: 61
LOS: 22 days | DRG: 870 | End: 2021-02-17
Attending: INTERNAL MEDICINE | Admitting: STUDENT IN AN ORGANIZED HEALTH CARE EDUCATION/TRAINING PROGRAM
Payer: MEDICARE

## 2021-01-01 ENCOUNTER — TRANSCRIPTION ENCOUNTER (OUTPATIENT)
Age: 61
End: 2021-01-01

## 2021-01-01 ENCOUNTER — EMERGENCY (EMERGENCY)
Facility: HOSPITAL | Age: 61
LOS: 1 days | Discharge: HOME | End: 2021-01-01
Attending: EMERGENCY MEDICINE
Payer: MEDICARE

## 2021-01-01 VITALS
HEART RATE: 103 BPM | TEMPERATURE: 99 F | WEIGHT: 160.06 LBS | HEIGHT: 66 IN | SYSTOLIC BLOOD PRESSURE: 130 MMHG | OXYGEN SATURATION: 85 % | DIASTOLIC BLOOD PRESSURE: 79 MMHG | RESPIRATION RATE: 20 BRPM

## 2021-01-01 VITALS
HEIGHT: 66 IN | RESPIRATION RATE: 18 BRPM | OXYGEN SATURATION: 95 % | WEIGHT: 145.06 LBS | SYSTOLIC BLOOD PRESSURE: 128 MMHG | HEART RATE: 90 BPM | DIASTOLIC BLOOD PRESSURE: 78 MMHG | TEMPERATURE: 98 F

## 2021-01-01 VITALS — RESPIRATION RATE: 18 BRPM | OXYGEN SATURATION: 97 %

## 2021-01-01 VITALS — HEART RATE: 110 BPM | OXYGEN SATURATION: 67 %

## 2021-01-01 DIAGNOSIS — U07.1 COVID-19: ICD-10-CM

## 2021-01-01 DIAGNOSIS — I10 ESSENTIAL (PRIMARY) HYPERTENSION: ICD-10-CM

## 2021-01-01 DIAGNOSIS — R07.9 CHEST PAIN, UNSPECIFIED: ICD-10-CM

## 2021-01-01 DIAGNOSIS — E11.9 TYPE 2 DIABETES MELLITUS WITHOUT COMPLICATIONS: ICD-10-CM

## 2021-01-01 DIAGNOSIS — Z98.891 HISTORY OF UTERINE SCAR FROM PREVIOUS SURGERY: Chronic | ICD-10-CM

## 2021-01-01 LAB
A1C WITH ESTIMATED AVERAGE GLUCOSE RESULT: 8.3 % — HIGH (ref 4–5.6)
ABO RH CONFIRMATION: SIGNIFICANT CHANGE UP
ALBUMIN SERPL ELPH-MCNC: 1.5 G/DL — LOW (ref 3.3–5.2)
ALBUMIN SERPL ELPH-MCNC: 1.5 G/DL — LOW (ref 3.3–5.2)
ALBUMIN SERPL ELPH-MCNC: 1.7 G/DL — LOW (ref 3.3–5.2)
ALBUMIN SERPL ELPH-MCNC: 1.8 G/DL — LOW (ref 3.3–5.2)
ALBUMIN SERPL ELPH-MCNC: 1.9 G/DL — LOW (ref 3.3–5.2)
ALBUMIN SERPL ELPH-MCNC: 1.9 G/DL — LOW (ref 3.3–5.2)
ALBUMIN SERPL ELPH-MCNC: 2.1 G/DL — LOW (ref 3.3–5.2)
ALBUMIN SERPL ELPH-MCNC: 2.1 G/DL — LOW (ref 3.3–5.2)
ALBUMIN SERPL ELPH-MCNC: 2.2 G/DL — LOW (ref 3.3–5.2)
ALBUMIN SERPL ELPH-MCNC: 2.3 G/DL — LOW (ref 3.3–5.2)
ALBUMIN SERPL ELPH-MCNC: 2.4 G/DL — LOW (ref 3.3–5.2)
ALBUMIN SERPL ELPH-MCNC: 2.6 G/DL — LOW (ref 3.3–5.2)
ALBUMIN SERPL ELPH-MCNC: 2.7 G/DL — LOW (ref 3.3–5.2)
ALBUMIN SERPL ELPH-MCNC: 2.9 G/DL — LOW (ref 3.3–5.2)
ALBUMIN SERPL ELPH-MCNC: 2.9 G/DL — LOW (ref 3.3–5.2)
ALBUMIN SERPL ELPH-MCNC: 3 G/DL — LOW (ref 3.3–5.2)
ALBUMIN SERPL ELPH-MCNC: 3.1 G/DL — LOW (ref 3.3–5.2)
ALBUMIN SERPL ELPH-MCNC: 3.2 G/DL — LOW (ref 3.3–5.2)
ALBUMIN SERPL ELPH-MCNC: 3.2 G/DL — LOW (ref 3.3–5.2)
ALBUMIN SERPL ELPH-MCNC: 3.3 G/DL — SIGNIFICANT CHANGE UP (ref 3.3–5.2)
ALBUMIN SERPL ELPH-MCNC: 3.4 G/DL — SIGNIFICANT CHANGE UP (ref 3.3–5.2)
ALBUMIN SERPL ELPH-MCNC: 3.5 G/DL — SIGNIFICANT CHANGE UP (ref 3.3–5.2)
ALBUMIN SERPL ELPH-MCNC: 3.6 G/DL — SIGNIFICANT CHANGE UP (ref 3.3–5.2)
ALBUMIN SERPL ELPH-MCNC: 3.6 G/DL — SIGNIFICANT CHANGE UP (ref 3.3–5.2)
ALP SERPL-CCNC: 100 U/L — SIGNIFICANT CHANGE UP (ref 40–120)
ALP SERPL-CCNC: 130 U/L — HIGH (ref 40–120)
ALP SERPL-CCNC: 57 U/L — SIGNIFICANT CHANGE UP (ref 40–120)
ALP SERPL-CCNC: 59 U/L — SIGNIFICANT CHANGE UP (ref 40–120)
ALP SERPL-CCNC: 59 U/L — SIGNIFICANT CHANGE UP (ref 40–120)
ALP SERPL-CCNC: 61 U/L — SIGNIFICANT CHANGE UP (ref 40–120)
ALP SERPL-CCNC: 63 U/L — SIGNIFICANT CHANGE UP (ref 40–120)
ALP SERPL-CCNC: 65 U/L — SIGNIFICANT CHANGE UP (ref 40–120)
ALP SERPL-CCNC: 65 U/L — SIGNIFICANT CHANGE UP (ref 40–120)
ALP SERPL-CCNC: 66 U/L — SIGNIFICANT CHANGE UP (ref 40–120)
ALP SERPL-CCNC: 66 U/L — SIGNIFICANT CHANGE UP (ref 40–120)
ALP SERPL-CCNC: 68 U/L — SIGNIFICANT CHANGE UP (ref 40–120)
ALP SERPL-CCNC: 69 U/L — SIGNIFICANT CHANGE UP (ref 40–120)
ALP SERPL-CCNC: 70 U/L — SIGNIFICANT CHANGE UP (ref 40–120)
ALP SERPL-CCNC: 71 U/L — SIGNIFICANT CHANGE UP (ref 40–120)
ALP SERPL-CCNC: 72 U/L — SIGNIFICANT CHANGE UP (ref 40–120)
ALP SERPL-CCNC: 74 U/L — SIGNIFICANT CHANGE UP (ref 40–120)
ALP SERPL-CCNC: 74 U/L — SIGNIFICANT CHANGE UP (ref 40–120)
ALP SERPL-CCNC: 75 U/L — SIGNIFICANT CHANGE UP (ref 40–120)
ALP SERPL-CCNC: 75 U/L — SIGNIFICANT CHANGE UP (ref 40–120)
ALP SERPL-CCNC: 78 U/L — SIGNIFICANT CHANGE UP (ref 40–120)
ALP SERPL-CCNC: 79 U/L — SIGNIFICANT CHANGE UP (ref 40–120)
ALP SERPL-CCNC: 81 U/L — SIGNIFICANT CHANGE UP (ref 40–120)
ALP SERPL-CCNC: 82 U/L — SIGNIFICANT CHANGE UP (ref 40–120)
ALP SERPL-CCNC: 84 U/L — SIGNIFICANT CHANGE UP (ref 40–120)
ALP SERPL-CCNC: 87 U/L — SIGNIFICANT CHANGE UP (ref 40–120)
ALP SERPL-CCNC: 88 U/L — SIGNIFICANT CHANGE UP (ref 40–120)
ALP SERPL-CCNC: 99 U/L — SIGNIFICANT CHANGE UP (ref 40–120)
ALT FLD-CCNC: 10 U/L — SIGNIFICANT CHANGE UP
ALT FLD-CCNC: 10 U/L — SIGNIFICANT CHANGE UP
ALT FLD-CCNC: 11 U/L — SIGNIFICANT CHANGE UP
ALT FLD-CCNC: 12 U/L — SIGNIFICANT CHANGE UP
ALT FLD-CCNC: 14 U/L — SIGNIFICANT CHANGE UP
ALT FLD-CCNC: 15 U/L — SIGNIFICANT CHANGE UP
ALT FLD-CCNC: 16 U/L — SIGNIFICANT CHANGE UP
ALT FLD-CCNC: 17 U/L — SIGNIFICANT CHANGE UP
ALT FLD-CCNC: 18 U/L — SIGNIFICANT CHANGE UP
ALT FLD-CCNC: 19 U/L — SIGNIFICANT CHANGE UP
ALT FLD-CCNC: 19 U/L — SIGNIFICANT CHANGE UP
ALT FLD-CCNC: 20 U/L — SIGNIFICANT CHANGE UP
ALT FLD-CCNC: 21 U/L — SIGNIFICANT CHANGE UP
ALT FLD-CCNC: 22 U/L — SIGNIFICANT CHANGE UP
ALT FLD-CCNC: 23 U/L — SIGNIFICANT CHANGE UP
ALT FLD-CCNC: 23 U/L — SIGNIFICANT CHANGE UP
ALT FLD-CCNC: 24 U/L — SIGNIFICANT CHANGE UP
ALT FLD-CCNC: 25 U/L — SIGNIFICANT CHANGE UP
ALT FLD-CCNC: 27 U/L — SIGNIFICANT CHANGE UP
ALT FLD-CCNC: 27 U/L — SIGNIFICANT CHANGE UP
ALT FLD-CCNC: 30 U/L — SIGNIFICANT CHANGE UP
ANION GAP SERPL CALC-SCNC: 10 MMOL/L — SIGNIFICANT CHANGE UP (ref 5–17)
ANION GAP SERPL CALC-SCNC: 10 MMOL/L — SIGNIFICANT CHANGE UP (ref 5–17)
ANION GAP SERPL CALC-SCNC: 11 MMOL/L — SIGNIFICANT CHANGE UP (ref 5–17)
ANION GAP SERPL CALC-SCNC: 12 MMOL/L — SIGNIFICANT CHANGE UP (ref 5–17)
ANION GAP SERPL CALC-SCNC: 13 MMOL/L — SIGNIFICANT CHANGE UP (ref 5–17)
ANION GAP SERPL CALC-SCNC: 14 MMOL/L — SIGNIFICANT CHANGE UP (ref 5–17)
ANION GAP SERPL CALC-SCNC: 15 MMOL/L — SIGNIFICANT CHANGE UP (ref 5–17)
ANION GAP SERPL CALC-SCNC: 15 MMOL/L — SIGNIFICANT CHANGE UP (ref 5–17)
ANION GAP SERPL CALC-SCNC: 16 MMOL/L — SIGNIFICANT CHANGE UP (ref 5–17)
ANION GAP SERPL CALC-SCNC: 16 MMOL/L — SIGNIFICANT CHANGE UP (ref 5–17)
ANION GAP SERPL CALC-SCNC: 18 MMOL/L — HIGH (ref 5–17)
ANION GAP SERPL CALC-SCNC: 21 MMOL/L — HIGH (ref 5–17)
ANION GAP SERPL CALC-SCNC: 9 MMOL/L — SIGNIFICANT CHANGE UP (ref 5–17)
ANISOCYTOSIS BLD QL: SLIGHT — SIGNIFICANT CHANGE UP
APPEARANCE UR: ABNORMAL
APTT BLD: 129.3 SEC — CRITICAL HIGH (ref 27.5–35.5)
APTT BLD: 171.7 SEC — CRITICAL HIGH (ref 27.5–35.5)
APTT BLD: 191.9 SEC — CRITICAL HIGH (ref 27.5–35.5)
APTT BLD: 197 SEC — CRITICAL HIGH (ref 27.5–35.5)
APTT BLD: 43.1 SEC — HIGH (ref 27.5–35.5)
APTT BLD: 51.6 SEC — HIGH (ref 27.5–35.5)
APTT BLD: 72 SEC — HIGH (ref 27.5–35.5)
APTT BLD: 79.5 SEC — HIGH (ref 27.5–35.5)
APTT BLD: 86.1 SEC — HIGH (ref 27.5–35.5)
APTT BLD: 90.8 SEC — HIGH (ref 27.5–35.5)
APTT BLD: 91 SEC — HIGH (ref 27.5–35.5)
APTT BLD: >200 SEC — CRITICAL HIGH (ref 27.5–35.5)
APTT BLD: >200 SEC — CRITICAL HIGH (ref 27.5–35.5)
AST SERPL-CCNC: 22 U/L — SIGNIFICANT CHANGE UP
AST SERPL-CCNC: 23 U/L — SIGNIFICANT CHANGE UP
AST SERPL-CCNC: 24 U/L — SIGNIFICANT CHANGE UP
AST SERPL-CCNC: 25 U/L — SIGNIFICANT CHANGE UP
AST SERPL-CCNC: 26 U/L — SIGNIFICANT CHANGE UP
AST SERPL-CCNC: 26 U/L — SIGNIFICANT CHANGE UP
AST SERPL-CCNC: 29 U/L — SIGNIFICANT CHANGE UP
AST SERPL-CCNC: 29 U/L — SIGNIFICANT CHANGE UP
AST SERPL-CCNC: 31 U/L — SIGNIFICANT CHANGE UP
AST SERPL-CCNC: 32 U/L — HIGH
AST SERPL-CCNC: 33 U/L — HIGH
AST SERPL-CCNC: 34 U/L — HIGH
AST SERPL-CCNC: 35 U/L — HIGH
AST SERPL-CCNC: 36 U/L — HIGH
AST SERPL-CCNC: 40 U/L — HIGH
AST SERPL-CCNC: 50 U/L — HIGH
AST SERPL-CCNC: 51 U/L — HIGH
AST SERPL-CCNC: 52 U/L — HIGH
AST SERPL-CCNC: 55 U/L — HIGH
AST SERPL-CCNC: 60 U/L — HIGH
AST SERPL-CCNC: 61 U/L — HIGH
B PERT DNA SPEC QL NAA+PROBE: SIGNIFICANT CHANGE UP
BACTERIA # UR AUTO: ABNORMAL
BASE EXCESS BLDA CALC-SCNC: -0.7 MMOL/L — SIGNIFICANT CHANGE UP (ref -2–2)
BASE EXCESS BLDA CALC-SCNC: -1.3 MMOL/L — SIGNIFICANT CHANGE UP (ref -2–2)
BASE EXCESS BLDA CALC-SCNC: 0.3 MMOL/L — SIGNIFICANT CHANGE UP (ref -2–2)
BASE EXCESS BLDA CALC-SCNC: 0.9 MMOL/L — SIGNIFICANT CHANGE UP (ref -2–2)
BASE EXCESS BLDA CALC-SCNC: 3.2 MMOL/L — HIGH (ref -3–3)
BASOPHILS # BLD AUTO: 0 K/UL — SIGNIFICANT CHANGE UP (ref 0–0.2)
BASOPHILS # BLD AUTO: 0.01 K/UL — SIGNIFICANT CHANGE UP (ref 0–0.2)
BASOPHILS # BLD AUTO: 0.01 K/UL — SIGNIFICANT CHANGE UP (ref 0–0.2)
BASOPHILS # BLD AUTO: 0.02 K/UL — SIGNIFICANT CHANGE UP (ref 0–0.2)
BASOPHILS # BLD AUTO: 0.04 K/UL — SIGNIFICANT CHANGE UP (ref 0–0.2)
BASOPHILS # BLD AUTO: 0.05 K/UL — SIGNIFICANT CHANGE UP (ref 0–0.2)
BASOPHILS # BLD AUTO: 0.05 K/UL — SIGNIFICANT CHANGE UP (ref 0–0.2)
BASOPHILS # BLD AUTO: 0.09 K/UL — SIGNIFICANT CHANGE UP (ref 0–0.2)
BASOPHILS # BLD AUTO: 0.42 K/UL — HIGH (ref 0–0.2)
BASOPHILS NFR BLD AUTO: 0 % — SIGNIFICANT CHANGE UP (ref 0–2)
BASOPHILS NFR BLD AUTO: 0.1 % — SIGNIFICANT CHANGE UP (ref 0–2)
BASOPHILS NFR BLD AUTO: 0.2 % — SIGNIFICANT CHANGE UP (ref 0–2)
BASOPHILS NFR BLD AUTO: 0.3 % — SIGNIFICANT CHANGE UP (ref 0–2)
BASOPHILS NFR BLD AUTO: 0.4 % — SIGNIFICANT CHANGE UP (ref 0–2)
BASOPHILS NFR BLD AUTO: 1.7 % — SIGNIFICANT CHANGE UP (ref 0–2)
BILIRUB DIRECT SERPL-MCNC: 0.1 MG/DL — SIGNIFICANT CHANGE UP (ref 0–0.3)
BILIRUB DIRECT SERPL-MCNC: 0.2 MG/DL — SIGNIFICANT CHANGE UP (ref 0–0.3)
BILIRUB DIRECT SERPL-MCNC: 0.3 MG/DL — SIGNIFICANT CHANGE UP (ref 0–0.3)
BILIRUB DIRECT SERPL-MCNC: 0.4 MG/DL — HIGH (ref 0–0.3)
BILIRUB DIRECT SERPL-MCNC: 0.4 MG/DL — HIGH (ref 0–0.3)
BILIRUB INDIRECT FLD-MCNC: 0.1 MG/DL — LOW (ref 0.2–1)
BILIRUB INDIRECT FLD-MCNC: 0.1 MG/DL — LOW (ref 0.2–1)
BILIRUB INDIRECT FLD-MCNC: 0.2 MG/DL — SIGNIFICANT CHANGE UP (ref 0.2–1)
BILIRUB INDIRECT FLD-MCNC: 0.3 MG/DL — SIGNIFICANT CHANGE UP (ref 0.2–1)
BILIRUB INDIRECT FLD-MCNC: 0.4 MG/DL — SIGNIFICANT CHANGE UP (ref 0.2–1)
BILIRUB INDIRECT FLD-MCNC: 0.4 MG/DL — SIGNIFICANT CHANGE UP (ref 0.2–1)
BILIRUB INDIRECT FLD-MCNC: 0.5 MG/DL — SIGNIFICANT CHANGE UP (ref 0.2–1)
BILIRUB INDIRECT FLD-MCNC: 0.6 MG/DL — SIGNIFICANT CHANGE UP (ref 0.2–1)
BILIRUB INDIRECT FLD-MCNC: 0.7 MG/DL — SIGNIFICANT CHANGE UP (ref 0.2–1)
BILIRUB SERPL-MCNC: 0.2 MG/DL — LOW (ref 0.4–2)
BILIRUB SERPL-MCNC: 0.2 MG/DL — LOW (ref 0.4–2)
BILIRUB SERPL-MCNC: 0.3 MG/DL — LOW (ref 0.4–2)
BILIRUB SERPL-MCNC: 0.4 MG/DL — SIGNIFICANT CHANGE UP (ref 0.4–2)
BILIRUB SERPL-MCNC: 0.5 MG/DL — SIGNIFICANT CHANGE UP (ref 0.4–2)
BILIRUB SERPL-MCNC: 0.6 MG/DL — SIGNIFICANT CHANGE UP (ref 0.4–2)
BILIRUB SERPL-MCNC: 0.7 MG/DL — SIGNIFICANT CHANGE UP (ref 0.4–2)
BILIRUB SERPL-MCNC: 0.8 MG/DL — SIGNIFICANT CHANGE UP (ref 0.4–2)
BILIRUB SERPL-MCNC: 1 MG/DL — SIGNIFICANT CHANGE UP (ref 0.4–2)
BILIRUB SERPL-MCNC: 1 MG/DL — SIGNIFICANT CHANGE UP (ref 0.4–2)
BILIRUB SERPL-MCNC: 1.2 MG/DL — SIGNIFICANT CHANGE UP (ref 0.4–2)
BILIRUB UR-MCNC: NEGATIVE — SIGNIFICANT CHANGE UP
BLD GP AB SCN SERPL QL: SIGNIFICANT CHANGE UP
BLOOD GAS COMMENTS ARTERIAL: SIGNIFICANT CHANGE UP
BUN SERPL-MCNC: 101 MG/DL — HIGH (ref 8–20)
BUN SERPL-MCNC: 103 MG/DL — HIGH (ref 8–20)
BUN SERPL-MCNC: 11 MG/DL — SIGNIFICANT CHANGE UP (ref 8–20)
BUN SERPL-MCNC: 119 MG/DL — HIGH (ref 8–20)
BUN SERPL-MCNC: 12 MG/DL — SIGNIFICANT CHANGE UP (ref 8–20)
BUN SERPL-MCNC: 23 MG/DL — HIGH (ref 8–20)
BUN SERPL-MCNC: 25 MG/DL — HIGH (ref 8–20)
BUN SERPL-MCNC: 28 MG/DL — HIGH (ref 8–20)
BUN SERPL-MCNC: 29 MG/DL — HIGH (ref 8–20)
BUN SERPL-MCNC: 30 MG/DL — HIGH (ref 8–20)
BUN SERPL-MCNC: 31 MG/DL — HIGH (ref 8–20)
BUN SERPL-MCNC: 31 MG/DL — HIGH (ref 8–20)
BUN SERPL-MCNC: 33 MG/DL — HIGH (ref 8–20)
BUN SERPL-MCNC: 37 MG/DL — HIGH (ref 8–20)
BUN SERPL-MCNC: 38 MG/DL — HIGH (ref 8–20)
BUN SERPL-MCNC: 39 MG/DL — HIGH (ref 8–20)
BUN SERPL-MCNC: 42 MG/DL — HIGH (ref 8–20)
BUN SERPL-MCNC: 45 MG/DL — HIGH (ref 8–20)
BUN SERPL-MCNC: 45 MG/DL — HIGH (ref 8–20)
BUN SERPL-MCNC: 49 MG/DL — HIGH (ref 8–20)
BUN SERPL-MCNC: 61 MG/DL — HIGH (ref 8–20)
BUN SERPL-MCNC: 68 MG/DL — HIGH (ref 8–20)
BUN SERPL-MCNC: 74 MG/DL — HIGH (ref 8–20)
BUN SERPL-MCNC: 76 MG/DL — HIGH (ref 8–20)
BUN SERPL-MCNC: 80 MG/DL — HIGH (ref 8–20)
BUN SERPL-MCNC: 81 MG/DL — HIGH (ref 8–20)
BUN SERPL-MCNC: 82 MG/DL — HIGH (ref 8–20)
BUN SERPL-MCNC: 91 MG/DL — HIGH (ref 8–20)
BURR CELLS BLD QL SMEAR: SLIGHT — SIGNIFICANT CHANGE UP
C PNEUM DNA SPEC QL NAA+PROBE: SIGNIFICANT CHANGE UP
CALCIUM SERPL-MCNC: 6.8 MG/DL — LOW (ref 8.6–10.2)
CALCIUM SERPL-MCNC: 6.8 MG/DL — LOW (ref 8.6–10.2)
CALCIUM SERPL-MCNC: 6.9 MG/DL — LOW (ref 8.6–10.2)
CALCIUM SERPL-MCNC: 7.1 MG/DL — LOW (ref 8.6–10.2)
CALCIUM SERPL-MCNC: 7.1 MG/DL — LOW (ref 8.6–10.2)
CALCIUM SERPL-MCNC: 7.4 MG/DL — LOW (ref 8.6–10.2)
CALCIUM SERPL-MCNC: 7.5 MG/DL — LOW (ref 8.6–10.2)
CALCIUM SERPL-MCNC: 7.6 MG/DL — LOW (ref 8.6–10.2)
CALCIUM SERPL-MCNC: 7.7 MG/DL — LOW (ref 8.6–10.2)
CALCIUM SERPL-MCNC: 7.8 MG/DL — LOW (ref 8.6–10.2)
CALCIUM SERPL-MCNC: 7.9 MG/DL — LOW (ref 8.6–10.2)
CALCIUM SERPL-MCNC: 8 MG/DL — LOW (ref 8.6–10.2)
CALCIUM SERPL-MCNC: 8.1 MG/DL — LOW (ref 8.6–10.2)
CALCIUM SERPL-MCNC: 8.4 MG/DL — LOW (ref 8.6–10.2)
CALCIUM SERPL-MCNC: 8.5 MG/DL — LOW (ref 8.6–10.2)
CALCIUM SERPL-MCNC: 8.6 MG/DL — SIGNIFICANT CHANGE UP (ref 8.6–10.2)
CALCIUM SERPL-MCNC: 8.7 MG/DL — SIGNIFICANT CHANGE UP (ref 8.6–10.2)
CALCIUM SERPL-MCNC: 8.8 MG/DL — SIGNIFICANT CHANGE UP (ref 8.6–10.2)
CALCIUM SERPL-MCNC: 8.8 MG/DL — SIGNIFICANT CHANGE UP (ref 8.6–10.2)
CALCIUM SERPL-MCNC: 8.9 MG/DL — SIGNIFICANT CHANGE UP (ref 8.6–10.2)
CALCIUM SERPL-MCNC: 9 MG/DL — SIGNIFICANT CHANGE UP (ref 8.6–10.2)
CALCIUM SERPL-MCNC: 9 MG/DL — SIGNIFICANT CHANGE UP (ref 8.6–10.2)
CALCIUM SERPL-MCNC: 9.2 MG/DL — SIGNIFICANT CHANGE UP (ref 8.6–10.2)
CALCIUM SERPL-MCNC: 9.2 MG/DL — SIGNIFICANT CHANGE UP (ref 8.6–10.2)
CALCIUM SERPL-MCNC: 9.3 MG/DL — SIGNIFICANT CHANGE UP (ref 8.6–10.2)
CHLORIDE SERPL-SCNC: 100 MMOL/L — SIGNIFICANT CHANGE UP (ref 98–107)
CHLORIDE SERPL-SCNC: 101 MMOL/L — SIGNIFICANT CHANGE UP (ref 98–107)
CHLORIDE SERPL-SCNC: 102 MMOL/L — SIGNIFICANT CHANGE UP (ref 98–107)
CHLORIDE SERPL-SCNC: 102 MMOL/L — SIGNIFICANT CHANGE UP (ref 98–107)
CHLORIDE SERPL-SCNC: 103 MMOL/L — SIGNIFICANT CHANGE UP (ref 98–107)
CHLORIDE SERPL-SCNC: 106 MMOL/L — SIGNIFICANT CHANGE UP (ref 98–107)
CHLORIDE SERPL-SCNC: 106 MMOL/L — SIGNIFICANT CHANGE UP (ref 98–107)
CHLORIDE SERPL-SCNC: 108 MMOL/L — HIGH (ref 98–107)
CHLORIDE SERPL-SCNC: 94 MMOL/L — LOW (ref 98–107)
CHLORIDE SERPL-SCNC: 95 MMOL/L — LOW (ref 98–107)
CHLORIDE SERPL-SCNC: 95 MMOL/L — LOW (ref 98–107)
CHLORIDE SERPL-SCNC: 96 MMOL/L — LOW (ref 98–107)
CHLORIDE SERPL-SCNC: 97 MMOL/L — LOW (ref 98–107)
CHLORIDE SERPL-SCNC: 98 MMOL/L — SIGNIFICANT CHANGE UP (ref 98–107)
CHLORIDE SERPL-SCNC: 98 MMOL/L — SIGNIFICANT CHANGE UP (ref 98–107)
CHLORIDE SERPL-SCNC: 99 MMOL/L — SIGNIFICANT CHANGE UP (ref 98–107)
CK MB CFR SERPL CALC: 5.3 NG/ML — SIGNIFICANT CHANGE UP (ref 0–6.7)
CK SERPL-CCNC: 824 U/L — HIGH (ref 25–170)
CO2 SERPL-SCNC: 17 MMOL/L — LOW (ref 22–29)
CO2 SERPL-SCNC: 18 MMOL/L — LOW (ref 22–29)
CO2 SERPL-SCNC: 19 MMOL/L — LOW (ref 22–29)
CO2 SERPL-SCNC: 20 MMOL/L — LOW (ref 22–29)
CO2 SERPL-SCNC: 21 MMOL/L — LOW (ref 22–29)
CO2 SERPL-SCNC: 21 MMOL/L — LOW (ref 22–29)
CO2 SERPL-SCNC: 22 MMOL/L — SIGNIFICANT CHANGE UP (ref 22–29)
CO2 SERPL-SCNC: 23 MMOL/L — SIGNIFICANT CHANGE UP (ref 22–29)
CO2 SERPL-SCNC: 24 MMOL/L — SIGNIFICANT CHANGE UP (ref 22–29)
CO2 SERPL-SCNC: 25 MMOL/L — SIGNIFICANT CHANGE UP (ref 22–29)
CO2 SERPL-SCNC: 27 MMOL/L — SIGNIFICANT CHANGE UP (ref 22–29)
CO2 SERPL-SCNC: 27 MMOL/L — SIGNIFICANT CHANGE UP (ref 22–29)
COLOR SPEC: YELLOW — SIGNIFICANT CHANGE UP
CREAT SERPL-MCNC: 0.55 MG/DL — SIGNIFICANT CHANGE UP (ref 0.5–1.3)
CREAT SERPL-MCNC: 0.57 MG/DL — SIGNIFICANT CHANGE UP (ref 0.5–1.3)
CREAT SERPL-MCNC: 0.58 MG/DL — SIGNIFICANT CHANGE UP (ref 0.5–1.3)
CREAT SERPL-MCNC: 0.58 MG/DL — SIGNIFICANT CHANGE UP (ref 0.5–1.3)
CREAT SERPL-MCNC: 0.61 MG/DL — SIGNIFICANT CHANGE UP (ref 0.5–1.3)
CREAT SERPL-MCNC: 0.61 MG/DL — SIGNIFICANT CHANGE UP (ref 0.5–1.3)
CREAT SERPL-MCNC: 0.62 MG/DL — SIGNIFICANT CHANGE UP (ref 0.5–1.3)
CREAT SERPL-MCNC: 0.62 MG/DL — SIGNIFICANT CHANGE UP (ref 0.5–1.3)
CREAT SERPL-MCNC: 0.66 MG/DL — SIGNIFICANT CHANGE UP (ref 0.5–1.3)
CREAT SERPL-MCNC: 0.66 MG/DL — SIGNIFICANT CHANGE UP (ref 0.5–1.3)
CREAT SERPL-MCNC: 0.67 MG/DL — SIGNIFICANT CHANGE UP (ref 0.5–1.3)
CREAT SERPL-MCNC: 0.69 MG/DL — SIGNIFICANT CHANGE UP (ref 0.5–1.3)
CREAT SERPL-MCNC: 0.7 MG/DL — SIGNIFICANT CHANGE UP (ref 0.5–1.3)
CREAT SERPL-MCNC: 0.72 MG/DL — SIGNIFICANT CHANGE UP (ref 0.5–1.3)
CREAT SERPL-MCNC: 0.73 MG/DL — SIGNIFICANT CHANGE UP (ref 0.5–1.3)
CREAT SERPL-MCNC: 0.82 MG/DL — SIGNIFICANT CHANGE UP (ref 0.5–1.3)
CREAT SERPL-MCNC: 0.84 MG/DL — SIGNIFICANT CHANGE UP (ref 0.5–1.3)
CREAT SERPL-MCNC: 0.89 MG/DL — SIGNIFICANT CHANGE UP (ref 0.5–1.3)
CREAT SERPL-MCNC: 0.95 MG/DL — SIGNIFICANT CHANGE UP (ref 0.5–1.3)
CREAT SERPL-MCNC: 1.11 MG/DL — SIGNIFICANT CHANGE UP (ref 0.5–1.3)
CREAT SERPL-MCNC: 1.12 MG/DL — SIGNIFICANT CHANGE UP (ref 0.5–1.3)
CREAT SERPL-MCNC: 1.15 MG/DL — SIGNIFICANT CHANGE UP (ref 0.5–1.3)
CREAT SERPL-MCNC: 1.26 MG/DL — SIGNIFICANT CHANGE UP (ref 0.5–1.3)
CREAT SERPL-MCNC: 1.63 MG/DL — HIGH (ref 0.5–1.3)
CREAT SERPL-MCNC: 2.42 MG/DL — HIGH (ref 0.5–1.3)
CREAT SERPL-MCNC: 2.84 MG/DL — HIGH (ref 0.5–1.3)
CREAT SERPL-MCNC: 3.11 MG/DL — HIGH (ref 0.5–1.3)
CREAT SERPL-MCNC: 3.12 MG/DL — HIGH (ref 0.5–1.3)
CREAT SERPL-MCNC: 3.24 MG/DL — HIGH (ref 0.5–1.3)
CREAT SERPL-MCNC: 3.32 MG/DL — HIGH (ref 0.5–1.3)
CREAT SERPL-MCNC: 3.35 MG/DL — HIGH (ref 0.5–1.3)
CREAT SERPL-MCNC: 3.51 MG/DL — HIGH (ref 0.5–1.3)
CREAT SERPL-MCNC: 4.04 MG/DL — HIGH (ref 0.5–1.3)
CREAT SERPL-MCNC: 4.09 MG/DL — HIGH (ref 0.5–1.3)
CREAT SERPL-MCNC: 4.13 MG/DL — HIGH (ref 0.5–1.3)
CREAT SERPL-MCNC: 4.86 MG/DL — HIGH (ref 0.5–1.3)
CRP SERPL-MCNC: 20.73 MG/DL — HIGH (ref 0–0.4)
CRP SERPL-MCNC: 5.83 MG/DL — HIGH (ref 0–0.4)
CRP SERPL-MCNC: 6.86 MG/DL — HIGH (ref 0–0.4)
CULTURE RESULTS: SIGNIFICANT CHANGE UP
D DIMER BLD IA.RAPID-MCNC: 1571 NG/ML DDU — HIGH
D DIMER BLD IA.RAPID-MCNC: 199 NG/ML DDU — SIGNIFICANT CHANGE UP
D DIMER BLD IA.RAPID-MCNC: 253 NG/ML DDU — HIGH
D DIMER BLD IA.RAPID-MCNC: 295 NG/ML DDU — HIGH
D DIMER BLD IA.RAPID-MCNC: 4591 NG/ML DDU — HIGH
D DIMER BLD IA.RAPID-MCNC: 6065 NG/ML DDU — HIGH
D DIMER BLD IA.RAPID-MCNC: 650 NG/ML DDU — HIGH
D DIMER BLD IA.RAPID-MCNC: 843 NG/ML DDU — HIGH
DIFF PNL FLD: ABNORMAL
ELLIPTOCYTES BLD QL SMEAR: SLIGHT — SIGNIFICANT CHANGE UP
EOSINOPHIL # BLD AUTO: 0 K/UL — SIGNIFICANT CHANGE UP (ref 0–0.5)
EOSINOPHIL # BLD AUTO: 0.02 K/UL — SIGNIFICANT CHANGE UP (ref 0–0.5)
EOSINOPHIL # BLD AUTO: 0.12 K/UL — SIGNIFICANT CHANGE UP (ref 0–0.5)
EOSINOPHIL # BLD AUTO: 0.15 K/UL — SIGNIFICANT CHANGE UP (ref 0–0.5)
EOSINOPHIL # BLD AUTO: 0.22 K/UL — SIGNIFICANT CHANGE UP (ref 0–0.5)
EOSINOPHIL # BLD AUTO: 0.24 K/UL — SIGNIFICANT CHANGE UP (ref 0–0.5)
EOSINOPHIL # BLD AUTO: 0.33 K/UL — SIGNIFICANT CHANGE UP (ref 0–0.5)
EOSINOPHIL # BLD AUTO: 0.37 K/UL — SIGNIFICANT CHANGE UP (ref 0–0.5)
EOSINOPHIL # BLD AUTO: 0.4 K/UL — SIGNIFICANT CHANGE UP (ref 0–0.5)
EOSINOPHIL # BLD AUTO: 0.89 K/UL — HIGH (ref 0–0.5)
EOSINOPHIL NFR BLD AUTO: 0 % — SIGNIFICANT CHANGE UP (ref 0–6)
EOSINOPHIL NFR BLD AUTO: 0.3 % — SIGNIFICANT CHANGE UP (ref 0–6)
EOSINOPHIL NFR BLD AUTO: 0.9 % — SIGNIFICANT CHANGE UP (ref 0–6)
EOSINOPHIL NFR BLD AUTO: 1.7 % — SIGNIFICANT CHANGE UP (ref 0–6)
EOSINOPHIL NFR BLD AUTO: 1.7 % — SIGNIFICANT CHANGE UP (ref 0–6)
EOSINOPHIL NFR BLD AUTO: 1.8 % — SIGNIFICANT CHANGE UP (ref 0–6)
EOSINOPHIL NFR BLD AUTO: 1.9 % — SIGNIFICANT CHANGE UP (ref 0–6)
EOSINOPHIL NFR BLD AUTO: 4.4 % — SIGNIFICANT CHANGE UP (ref 0–6)
EPI CELLS # UR: SIGNIFICANT CHANGE UP
ESTIMATED AVERAGE GLUCOSE: 192 MG/DL — HIGH (ref 68–114)
FERRITIN SERPL-MCNC: 2568 NG/ML — HIGH (ref 15–150)
FERRITIN SERPL-MCNC: 356 NG/ML — HIGH (ref 15–150)
FERRITIN SERPL-MCNC: 915 NG/ML — HIGH (ref 15–150)
FLUAV AG NPH QL: SIGNIFICANT CHANGE UP
FLUAV H1 2009 PAND RNA SPEC QL NAA+PROBE: SIGNIFICANT CHANGE UP
FLUAV H1 RNA SPEC QL NAA+PROBE: SIGNIFICANT CHANGE UP
FLUAV H3 RNA SPEC QL NAA+PROBE: SIGNIFICANT CHANGE UP
FLUAV SUBTYP SPEC NAA+PROBE: SIGNIFICANT CHANGE UP
FLUBV AG NPH QL: SIGNIFICANT CHANGE UP
FLUBV RNA SPEC QL NAA+PROBE: SIGNIFICANT CHANGE UP
GAS PNL BLDA: SIGNIFICANT CHANGE UP
GIANT PLATELETS BLD QL SMEAR: PRESENT — SIGNIFICANT CHANGE UP
GLUCOSE BLDC GLUCOMTR-MCNC: 102 MG/DL — HIGH (ref 70–99)
GLUCOSE BLDC GLUCOMTR-MCNC: 109 MG/DL — HIGH (ref 70–99)
GLUCOSE BLDC GLUCOMTR-MCNC: 110 MG/DL — HIGH (ref 70–99)
GLUCOSE BLDC GLUCOMTR-MCNC: 113 MG/DL — HIGH (ref 70–99)
GLUCOSE BLDC GLUCOMTR-MCNC: 114 MG/DL — HIGH (ref 70–99)
GLUCOSE BLDC GLUCOMTR-MCNC: 115 MG/DL — HIGH (ref 70–99)
GLUCOSE BLDC GLUCOMTR-MCNC: 118 MG/DL — HIGH (ref 70–99)
GLUCOSE BLDC GLUCOMTR-MCNC: 119 MG/DL — HIGH (ref 70–99)
GLUCOSE BLDC GLUCOMTR-MCNC: 122 MG/DL — HIGH (ref 70–99)
GLUCOSE BLDC GLUCOMTR-MCNC: 123 MG/DL — HIGH (ref 70–99)
GLUCOSE BLDC GLUCOMTR-MCNC: 124 MG/DL — HIGH (ref 70–99)
GLUCOSE BLDC GLUCOMTR-MCNC: 126 MG/DL — HIGH (ref 70–99)
GLUCOSE BLDC GLUCOMTR-MCNC: 127 MG/DL — HIGH (ref 70–99)
GLUCOSE BLDC GLUCOMTR-MCNC: 129 MG/DL — HIGH (ref 70–99)
GLUCOSE BLDC GLUCOMTR-MCNC: 130 MG/DL — HIGH (ref 70–99)
GLUCOSE BLDC GLUCOMTR-MCNC: 133 MG/DL — HIGH (ref 70–99)
GLUCOSE BLDC GLUCOMTR-MCNC: 134 MG/DL — HIGH (ref 70–99)
GLUCOSE BLDC GLUCOMTR-MCNC: 135 MG/DL — HIGH (ref 70–99)
GLUCOSE BLDC GLUCOMTR-MCNC: 135 MG/DL — HIGH (ref 70–99)
GLUCOSE BLDC GLUCOMTR-MCNC: 136 MG/DL — HIGH (ref 70–99)
GLUCOSE BLDC GLUCOMTR-MCNC: 137 MG/DL — HIGH (ref 70–99)
GLUCOSE BLDC GLUCOMTR-MCNC: 139 MG/DL — HIGH (ref 70–99)
GLUCOSE BLDC GLUCOMTR-MCNC: 141 MG/DL — HIGH (ref 70–99)
GLUCOSE BLDC GLUCOMTR-MCNC: 142 MG/DL — HIGH (ref 70–99)
GLUCOSE BLDC GLUCOMTR-MCNC: 142 MG/DL — HIGH (ref 70–99)
GLUCOSE BLDC GLUCOMTR-MCNC: 143 MG/DL — HIGH (ref 70–99)
GLUCOSE BLDC GLUCOMTR-MCNC: 144 MG/DL — HIGH (ref 70–99)
GLUCOSE BLDC GLUCOMTR-MCNC: 146 MG/DL — HIGH (ref 70–99)
GLUCOSE BLDC GLUCOMTR-MCNC: 146 MG/DL — HIGH (ref 70–99)
GLUCOSE BLDC GLUCOMTR-MCNC: 147 MG/DL — HIGH (ref 70–99)
GLUCOSE BLDC GLUCOMTR-MCNC: 147 MG/DL — HIGH (ref 70–99)
GLUCOSE BLDC GLUCOMTR-MCNC: 148 MG/DL — HIGH (ref 70–99)
GLUCOSE BLDC GLUCOMTR-MCNC: 149 MG/DL — HIGH (ref 70–99)
GLUCOSE BLDC GLUCOMTR-MCNC: 150 MG/DL — HIGH (ref 70–99)
GLUCOSE BLDC GLUCOMTR-MCNC: 150 MG/DL — HIGH (ref 70–99)
GLUCOSE BLDC GLUCOMTR-MCNC: 151 MG/DL — HIGH (ref 70–99)
GLUCOSE BLDC GLUCOMTR-MCNC: 151 MG/DL — HIGH (ref 70–99)
GLUCOSE BLDC GLUCOMTR-MCNC: 155 MG/DL — HIGH (ref 70–99)
GLUCOSE BLDC GLUCOMTR-MCNC: 155 MG/DL — HIGH (ref 70–99)
GLUCOSE BLDC GLUCOMTR-MCNC: 156 MG/DL — HIGH (ref 70–99)
GLUCOSE BLDC GLUCOMTR-MCNC: 156 MG/DL — HIGH (ref 70–99)
GLUCOSE BLDC GLUCOMTR-MCNC: 157 MG/DL — HIGH (ref 70–99)
GLUCOSE BLDC GLUCOMTR-MCNC: 160 MG/DL — HIGH (ref 70–99)
GLUCOSE BLDC GLUCOMTR-MCNC: 161 MG/DL — HIGH (ref 70–99)
GLUCOSE BLDC GLUCOMTR-MCNC: 161 MG/DL — HIGH (ref 70–99)
GLUCOSE BLDC GLUCOMTR-MCNC: 165 MG/DL — HIGH (ref 70–99)
GLUCOSE BLDC GLUCOMTR-MCNC: 166 MG/DL — HIGH (ref 70–99)
GLUCOSE BLDC GLUCOMTR-MCNC: 166 MG/DL — HIGH (ref 70–99)
GLUCOSE BLDC GLUCOMTR-MCNC: 167 MG/DL — HIGH (ref 70–99)
GLUCOSE BLDC GLUCOMTR-MCNC: 168 MG/DL — HIGH (ref 70–99)
GLUCOSE BLDC GLUCOMTR-MCNC: 169 MG/DL — HIGH (ref 70–99)
GLUCOSE BLDC GLUCOMTR-MCNC: 169 MG/DL — HIGH (ref 70–99)
GLUCOSE BLDC GLUCOMTR-MCNC: 176 MG/DL — HIGH (ref 70–99)
GLUCOSE BLDC GLUCOMTR-MCNC: 177 MG/DL — HIGH (ref 70–99)
GLUCOSE BLDC GLUCOMTR-MCNC: 181 MG/DL — HIGH (ref 70–99)
GLUCOSE BLDC GLUCOMTR-MCNC: 182 MG/DL — HIGH (ref 70–99)
GLUCOSE BLDC GLUCOMTR-MCNC: 183 MG/DL — HIGH (ref 70–99)
GLUCOSE BLDC GLUCOMTR-MCNC: 185 MG/DL — HIGH (ref 70–99)
GLUCOSE BLDC GLUCOMTR-MCNC: 185 MG/DL — HIGH (ref 70–99)
GLUCOSE BLDC GLUCOMTR-MCNC: 187 MG/DL — HIGH (ref 70–99)
GLUCOSE BLDC GLUCOMTR-MCNC: 189 MG/DL — HIGH (ref 70–99)
GLUCOSE BLDC GLUCOMTR-MCNC: 190 MG/DL — HIGH (ref 70–99)
GLUCOSE BLDC GLUCOMTR-MCNC: 190 MG/DL — HIGH (ref 70–99)
GLUCOSE BLDC GLUCOMTR-MCNC: 192 MG/DL — HIGH (ref 70–99)
GLUCOSE BLDC GLUCOMTR-MCNC: 194 MG/DL — HIGH (ref 70–99)
GLUCOSE BLDC GLUCOMTR-MCNC: 195 MG/DL — HIGH (ref 70–99)
GLUCOSE BLDC GLUCOMTR-MCNC: 197 MG/DL — HIGH (ref 70–99)
GLUCOSE BLDC GLUCOMTR-MCNC: 201 MG/DL — HIGH (ref 70–99)
GLUCOSE BLDC GLUCOMTR-MCNC: 204 MG/DL — HIGH (ref 70–99)
GLUCOSE BLDC GLUCOMTR-MCNC: 205 MG/DL — HIGH (ref 70–99)
GLUCOSE BLDC GLUCOMTR-MCNC: 205 MG/DL — HIGH (ref 70–99)
GLUCOSE BLDC GLUCOMTR-MCNC: 206 MG/DL — HIGH (ref 70–99)
GLUCOSE BLDC GLUCOMTR-MCNC: 210 MG/DL — HIGH (ref 70–99)
GLUCOSE BLDC GLUCOMTR-MCNC: 211 MG/DL — HIGH (ref 70–99)
GLUCOSE BLDC GLUCOMTR-MCNC: 212 MG/DL — HIGH (ref 70–99)
GLUCOSE BLDC GLUCOMTR-MCNC: 218 MG/DL — HIGH (ref 70–99)
GLUCOSE BLDC GLUCOMTR-MCNC: 220 MG/DL — HIGH (ref 70–99)
GLUCOSE BLDC GLUCOMTR-MCNC: 221 MG/DL — HIGH (ref 70–99)
GLUCOSE BLDC GLUCOMTR-MCNC: 223 MG/DL — HIGH (ref 70–99)
GLUCOSE BLDC GLUCOMTR-MCNC: 225 MG/DL — HIGH (ref 70–99)
GLUCOSE BLDC GLUCOMTR-MCNC: 226 MG/DL — HIGH (ref 70–99)
GLUCOSE BLDC GLUCOMTR-MCNC: 232 MG/DL — HIGH (ref 70–99)
GLUCOSE BLDC GLUCOMTR-MCNC: 232 MG/DL — HIGH (ref 70–99)
GLUCOSE BLDC GLUCOMTR-MCNC: 240 MG/DL — HIGH (ref 70–99)
GLUCOSE BLDC GLUCOMTR-MCNC: 240 MG/DL — HIGH (ref 70–99)
GLUCOSE BLDC GLUCOMTR-MCNC: 241 MG/DL — HIGH (ref 70–99)
GLUCOSE BLDC GLUCOMTR-MCNC: 246 MG/DL — HIGH (ref 70–99)
GLUCOSE BLDC GLUCOMTR-MCNC: 248 MG/DL — HIGH (ref 70–99)
GLUCOSE BLDC GLUCOMTR-MCNC: 249 MG/DL — HIGH (ref 70–99)
GLUCOSE BLDC GLUCOMTR-MCNC: 251 MG/DL — HIGH (ref 70–99)
GLUCOSE BLDC GLUCOMTR-MCNC: 260 MG/DL — HIGH (ref 70–99)
GLUCOSE BLDC GLUCOMTR-MCNC: 271 MG/DL — HIGH (ref 70–99)
GLUCOSE BLDC GLUCOMTR-MCNC: 274 MG/DL — HIGH (ref 70–99)
GLUCOSE BLDC GLUCOMTR-MCNC: 282 MG/DL — HIGH (ref 70–99)
GLUCOSE BLDC GLUCOMTR-MCNC: 286 MG/DL — HIGH (ref 70–99)
GLUCOSE BLDC GLUCOMTR-MCNC: 286 MG/DL — HIGH (ref 70–99)
GLUCOSE BLDC GLUCOMTR-MCNC: 287 MG/DL — HIGH (ref 70–99)
GLUCOSE BLDC GLUCOMTR-MCNC: 287 MG/DL — HIGH (ref 70–99)
GLUCOSE BLDC GLUCOMTR-MCNC: 296 MG/DL — HIGH (ref 70–99)
GLUCOSE BLDC GLUCOMTR-MCNC: 299 MG/DL — HIGH (ref 70–99)
GLUCOSE BLDC GLUCOMTR-MCNC: 300 MG/DL — HIGH (ref 70–99)
GLUCOSE BLDC GLUCOMTR-MCNC: 302 MG/DL — HIGH (ref 70–99)
GLUCOSE BLDC GLUCOMTR-MCNC: 32 MG/DL — CRITICAL LOW (ref 70–99)
GLUCOSE BLDC GLUCOMTR-MCNC: 324 MG/DL — HIGH (ref 70–99)
GLUCOSE BLDC GLUCOMTR-MCNC: 325 MG/DL — HIGH (ref 70–99)
GLUCOSE BLDC GLUCOMTR-MCNC: 33 MG/DL — CRITICAL LOW (ref 70–99)
GLUCOSE BLDC GLUCOMTR-MCNC: 333 MG/DL — HIGH (ref 70–99)
GLUCOSE BLDC GLUCOMTR-MCNC: 344 MG/DL — HIGH (ref 70–99)
GLUCOSE BLDC GLUCOMTR-MCNC: 389 MG/DL — HIGH (ref 70–99)
GLUCOSE BLDC GLUCOMTR-MCNC: 41 MG/DL — CRITICAL LOW (ref 70–99)
GLUCOSE BLDC GLUCOMTR-MCNC: 62 MG/DL — LOW (ref 70–99)
GLUCOSE BLDC GLUCOMTR-MCNC: 67 MG/DL — LOW (ref 70–99)
GLUCOSE BLDC GLUCOMTR-MCNC: 69 MG/DL — LOW (ref 70–99)
GLUCOSE BLDC GLUCOMTR-MCNC: 70 MG/DL — SIGNIFICANT CHANGE UP (ref 70–99)
GLUCOSE BLDC GLUCOMTR-MCNC: 84 MG/DL — SIGNIFICANT CHANGE UP (ref 70–99)
GLUCOSE BLDC GLUCOMTR-MCNC: 88 MG/DL — SIGNIFICANT CHANGE UP (ref 70–99)
GLUCOSE BLDC GLUCOMTR-MCNC: 91 MG/DL — SIGNIFICANT CHANGE UP (ref 70–99)
GLUCOSE BLDC GLUCOMTR-MCNC: 92 MG/DL — SIGNIFICANT CHANGE UP (ref 70–99)
GLUCOSE BLDC GLUCOMTR-MCNC: 99 MG/DL — SIGNIFICANT CHANGE UP (ref 70–99)
GLUCOSE SERPL-MCNC: 101 MG/DL — HIGH (ref 70–99)
GLUCOSE SERPL-MCNC: 103 MG/DL — HIGH (ref 70–99)
GLUCOSE SERPL-MCNC: 105 MG/DL — HIGH (ref 70–99)
GLUCOSE SERPL-MCNC: 108 MG/DL — HIGH (ref 70–99)
GLUCOSE SERPL-MCNC: 110 MG/DL — HIGH (ref 70–99)
GLUCOSE SERPL-MCNC: 112 MG/DL — HIGH (ref 70–99)
GLUCOSE SERPL-MCNC: 120 MG/DL — HIGH (ref 70–99)
GLUCOSE SERPL-MCNC: 122 MG/DL — HIGH (ref 70–99)
GLUCOSE SERPL-MCNC: 126 MG/DL — HIGH (ref 70–99)
GLUCOSE SERPL-MCNC: 130 MG/DL — HIGH (ref 70–99)
GLUCOSE SERPL-MCNC: 135 MG/DL — HIGH (ref 70–99)
GLUCOSE SERPL-MCNC: 137 MG/DL — HIGH (ref 70–99)
GLUCOSE SERPL-MCNC: 139 MG/DL — HIGH (ref 70–99)
GLUCOSE SERPL-MCNC: 144 MG/DL — HIGH (ref 70–99)
GLUCOSE SERPL-MCNC: 147 MG/DL — HIGH (ref 70–99)
GLUCOSE SERPL-MCNC: 155 MG/DL — HIGH (ref 70–99)
GLUCOSE SERPL-MCNC: 160 MG/DL — HIGH (ref 70–99)
GLUCOSE SERPL-MCNC: 177 MG/DL — HIGH (ref 70–99)
GLUCOSE SERPL-MCNC: 182 MG/DL — HIGH (ref 70–99)
GLUCOSE SERPL-MCNC: 189 MG/DL — HIGH (ref 70–99)
GLUCOSE SERPL-MCNC: 191 MG/DL — HIGH (ref 70–99)
GLUCOSE SERPL-MCNC: 192 MG/DL — HIGH (ref 70–99)
GLUCOSE SERPL-MCNC: 205 MG/DL — HIGH (ref 70–99)
GLUCOSE SERPL-MCNC: 227 MG/DL — HIGH (ref 70–99)
GLUCOSE SERPL-MCNC: 231 MG/DL — HIGH (ref 70–99)
GLUCOSE SERPL-MCNC: 232 MG/DL — HIGH (ref 70–99)
GLUCOSE SERPL-MCNC: 262 MG/DL — HIGH (ref 70–99)
GLUCOSE SERPL-MCNC: 273 MG/DL — HIGH (ref 70–99)
GLUCOSE SERPL-MCNC: 274 MG/DL — HIGH (ref 70–99)
GLUCOSE SERPL-MCNC: 279 MG/DL — HIGH (ref 70–99)
GLUCOSE SERPL-MCNC: 75 MG/DL — SIGNIFICANT CHANGE UP (ref 70–99)
GLUCOSE SERPL-MCNC: 92 MG/DL — SIGNIFICANT CHANGE UP (ref 70–99)
GLUCOSE UR QL: 250 MG/DL
HADV DNA SPEC QL NAA+PROBE: SIGNIFICANT CHANGE UP
HBV CORE AB SER-ACNC: SIGNIFICANT CHANGE UP
HBV CORE IGM SER-ACNC: SIGNIFICANT CHANGE UP
HBV SURFACE AB SER-ACNC: 10.5 MIU/ML — LOW
HBV SURFACE AG SER-ACNC: SIGNIFICANT CHANGE UP
HCO3 BLDA-SCNC: 23 MMOL/L — SIGNIFICANT CHANGE UP (ref 21–29)
HCO3 BLDA-SCNC: 24 MMOL/L — SIGNIFICANT CHANGE UP (ref 21–29)
HCO3 BLDA-SCNC: 25 MMOL/L — SIGNIFICANT CHANGE UP (ref 21–29)
HCO3 BLDA-SCNC: 25 MMOL/L — SIGNIFICANT CHANGE UP (ref 21–29)
HCO3 BLDA-SCNC: 27 MMOL/L — HIGH (ref 20–26)
HCOV PNL SPEC NAA+PROBE: SIGNIFICANT CHANGE UP
HCT VFR BLD CALC: 20.1 % — CRITICAL LOW (ref 34.5–45)
HCT VFR BLD CALC: 21.7 % — LOW (ref 34.5–45)
HCT VFR BLD CALC: 22.7 % — LOW (ref 34.5–45)
HCT VFR BLD CALC: 22.7 % — LOW (ref 34.5–45)
HCT VFR BLD CALC: 23.4 % — LOW (ref 34.5–45)
HCT VFR BLD CALC: 23.4 % — LOW (ref 34.5–45)
HCT VFR BLD CALC: 23.5 % — LOW (ref 34.5–45)
HCT VFR BLD CALC: 24 % — LOW (ref 34.5–45)
HCT VFR BLD CALC: 24.2 % — LOW (ref 34.5–45)
HCT VFR BLD CALC: 25.1 % — LOW (ref 34.5–45)
HCT VFR BLD CALC: 25.2 % — LOW (ref 34.5–45)
HCT VFR BLD CALC: 25.3 % — LOW (ref 34.5–45)
HCT VFR BLD CALC: 25.8 % — LOW (ref 34.5–45)
HCT VFR BLD CALC: 26 % — LOW (ref 34.5–45)
HCT VFR BLD CALC: 28 % — LOW (ref 34.5–45)
HCT VFR BLD CALC: 29 % — LOW (ref 34.5–45)
HCT VFR BLD CALC: 30.8 % — LOW (ref 34.5–45)
HCT VFR BLD CALC: 31.2 % — LOW (ref 34.5–45)
HCT VFR BLD CALC: 31.9 % — LOW (ref 34.5–45)
HCT VFR BLD CALC: 32.1 % — LOW (ref 34.5–45)
HCT VFR BLD CALC: 32.2 % — LOW (ref 34.5–45)
HCT VFR BLD CALC: 32.4 % — LOW (ref 34.5–45)
HCT VFR BLD CALC: 32.7 % — LOW (ref 34.5–45)
HCT VFR BLD CALC: 33.1 % — LOW (ref 34.5–45)
HCT VFR BLD CALC: 34.4 % — LOW (ref 34.5–45)
HCT VFR BLD CALC: 34.5 % — SIGNIFICANT CHANGE UP (ref 34.5–45)
HCT VFR BLD CALC: 36 % — SIGNIFICANT CHANGE UP (ref 34.5–45)
HCT VFR BLD CALC: 36 % — SIGNIFICANT CHANGE UP (ref 34.5–45)
HCV AB S/CO SERPL IA: 0.08 S/CO — SIGNIFICANT CHANGE UP (ref 0–0.99)
HCV AB SERPL-IMP: SIGNIFICANT CHANGE UP
HGB BLD-MCNC: 10.2 G/DL — LOW (ref 11.5–15.5)
HGB BLD-MCNC: 10.3 G/DL — LOW (ref 11.5–15.5)
HGB BLD-MCNC: 10.6 G/DL — LOW (ref 11.5–15.5)
HGB BLD-MCNC: 10.6 G/DL — LOW (ref 11.5–15.5)
HGB BLD-MCNC: 10.8 G/DL — LOW (ref 11.5–15.5)
HGB BLD-MCNC: 10.9 G/DL — LOW (ref 11.5–15.5)
HGB BLD-MCNC: 11 G/DL — LOW (ref 11.5–15.5)
HGB BLD-MCNC: 11.4 G/DL — LOW (ref 11.5–15.5)
HGB BLD-MCNC: 11.6 G/DL — SIGNIFICANT CHANGE UP (ref 11.5–15.5)
HGB BLD-MCNC: 11.8 G/DL — SIGNIFICANT CHANGE UP (ref 11.5–15.5)
HGB BLD-MCNC: 11.8 G/DL — SIGNIFICANT CHANGE UP (ref 11.5–15.5)
HGB BLD-MCNC: 6.4 G/DL — CRITICAL LOW (ref 11.5–15.5)
HGB BLD-MCNC: 6.7 G/DL — CRITICAL LOW (ref 11.5–15.5)
HGB BLD-MCNC: 7.2 G/DL — LOW (ref 11.5–15.5)
HGB BLD-MCNC: 7.2 G/DL — LOW (ref 11.5–15.5)
HGB BLD-MCNC: 7.4 G/DL — LOW (ref 11.5–15.5)
HGB BLD-MCNC: 7.6 G/DL — LOW (ref 11.5–15.5)
HGB BLD-MCNC: 7.7 G/DL — LOW (ref 11.5–15.5)
HGB BLD-MCNC: 7.8 G/DL — LOW (ref 11.5–15.5)
HGB BLD-MCNC: 7.8 G/DL — LOW (ref 11.5–15.5)
HGB BLD-MCNC: 8.2 G/DL — LOW (ref 11.5–15.5)
HGB BLD-MCNC: 8.2 G/DL — LOW (ref 11.5–15.5)
HGB BLD-MCNC: 8.4 G/DL — LOW (ref 11.5–15.5)
HGB BLD-MCNC: 8.9 G/DL — LOW (ref 11.5–15.5)
HGB BLD-MCNC: 9.4 G/DL — LOW (ref 11.5–15.5)
HGB BLD-MCNC: 9.7 G/DL — LOW (ref 11.5–15.5)
HMPV RNA SPEC QL NAA+PROBE: SIGNIFICANT CHANGE UP
HOROWITZ INDEX BLDA+IHG-RTO: 0.8 — SIGNIFICANT CHANGE UP
HOROWITZ INDEX BLDA+IHG-RTO: 100 — SIGNIFICANT CHANGE UP
HPIV1 RNA SPEC QL NAA+PROBE: SIGNIFICANT CHANGE UP
HPIV2 RNA SPEC QL NAA+PROBE: SIGNIFICANT CHANGE UP
HPIV3 RNA SPEC QL NAA+PROBE: SIGNIFICANT CHANGE UP
HPIV4 RNA SPEC QL NAA+PROBE: SIGNIFICANT CHANGE UP
IMM GRANULOCYTES NFR BLD AUTO: 0.7 % — SIGNIFICANT CHANGE UP (ref 0–1.5)
IMM GRANULOCYTES NFR BLD AUTO: 0.7 % — SIGNIFICANT CHANGE UP (ref 0–1.5)
IMM GRANULOCYTES NFR BLD AUTO: 1.8 % — HIGH (ref 0–1.5)
IMM GRANULOCYTES NFR BLD AUTO: 4.5 % — HIGH (ref 0–1.5)
IMM GRANULOCYTES NFR BLD AUTO: 4.7 % — HIGH (ref 0–1.5)
IMM GRANULOCYTES NFR BLD AUTO: 6.8 % — HIGH (ref 0–1.5)
IMM GRANULOCYTES NFR BLD AUTO: 8 % — HIGH (ref 0–1.5)
INR BLD: 1.27 RATIO — HIGH (ref 0.88–1.16)
IRON SATN MFR SERPL: 16 UG/DL — LOW (ref 37–145)
IRON SATN MFR SERPL: 5 % — LOW (ref 14–50)
KETONES UR-MCNC: NEGATIVE — SIGNIFICANT CHANGE UP
LACTATE BLDV-MCNC: 2.1 MMOL/L — HIGH (ref 0.5–2)
LACTATE SERPL-SCNC: 2.2 MMOL/L — HIGH (ref 0.5–2)
LDH SERPL L TO P-CCNC: 397 U/L — HIGH (ref 98–192)
LEUKOCYTE ESTERASE UR-ACNC: NEGATIVE — SIGNIFICANT CHANGE UP
LYMPHOCYTES # BLD AUTO: 0.3 K/UL — LOW (ref 1–3.3)
LYMPHOCYTES # BLD AUTO: 0.65 K/UL — LOW (ref 1–3.3)
LYMPHOCYTES # BLD AUTO: 0.73 K/UL — LOW (ref 1–3.3)
LYMPHOCYTES # BLD AUTO: 0.83 K/UL — LOW (ref 1–3.3)
LYMPHOCYTES # BLD AUTO: 0.9 K/UL — LOW (ref 1–3.3)
LYMPHOCYTES # BLD AUTO: 0.91 K/UL — LOW (ref 1–3.3)
LYMPHOCYTES # BLD AUTO: 0.93 K/UL — LOW (ref 1–3.3)
LYMPHOCYTES # BLD AUTO: 0.96 K/UL — LOW (ref 1–3.3)
LYMPHOCYTES # BLD AUTO: 1.1 K/UL — SIGNIFICANT CHANGE UP (ref 1–3.3)
LYMPHOCYTES # BLD AUTO: 1.34 K/UL — SIGNIFICANT CHANGE UP (ref 1–3.3)
LYMPHOCYTES # BLD AUTO: 1.37 K/UL — SIGNIFICANT CHANGE UP (ref 1–3.3)
LYMPHOCYTES # BLD AUTO: 1.41 K/UL — SIGNIFICANT CHANGE UP (ref 1–3.3)
LYMPHOCYTES # BLD AUTO: 1.44 K/UL — SIGNIFICANT CHANGE UP (ref 1–3.3)
LYMPHOCYTES # BLD AUTO: 1.68 K/UL — SIGNIFICANT CHANGE UP (ref 1–3.3)
LYMPHOCYTES # BLD AUTO: 10.4 % — LOW (ref 13–44)
LYMPHOCYTES # BLD AUTO: 13.9 % — SIGNIFICANT CHANGE UP (ref 13–44)
LYMPHOCYTES # BLD AUTO: 15.3 % — SIGNIFICANT CHANGE UP (ref 13–44)
LYMPHOCYTES # BLD AUTO: 2.6 % — LOW (ref 13–44)
LYMPHOCYTES # BLD AUTO: 3.6 % — LOW (ref 13–44)
LYMPHOCYTES # BLD AUTO: 4.4 % — LOW (ref 13–44)
LYMPHOCYTES # BLD AUTO: 4.4 % — LOW (ref 13–44)
LYMPHOCYTES # BLD AUTO: 5.2 % — LOW (ref 13–44)
LYMPHOCYTES # BLD AUTO: 5.3 % — LOW (ref 13–44)
LYMPHOCYTES # BLD AUTO: 5.5 % — LOW (ref 13–44)
LYMPHOCYTES # BLD AUTO: 6 % — LOW (ref 13–44)
LYMPHOCYTES # BLD AUTO: 6.5 % — LOW (ref 13–44)
LYMPHOCYTES # BLD AUTO: 6.6 % — LOW (ref 13–44)
LYMPHOCYTES # BLD AUTO: 9.9 % — LOW (ref 13–44)
MACROCYTES BLD QL: SLIGHT — SIGNIFICANT CHANGE UP
MACROCYTES BLD QL: SLIGHT — SIGNIFICANT CHANGE UP
MAGNESIUM SERPL-MCNC: 2 MG/DL — SIGNIFICANT CHANGE UP (ref 1.6–2.6)
MAGNESIUM SERPL-MCNC: 2 MG/DL — SIGNIFICANT CHANGE UP (ref 1.6–2.6)
MAGNESIUM SERPL-MCNC: 2.2 MG/DL — SIGNIFICANT CHANGE UP (ref 1.6–2.6)
MAGNESIUM SERPL-MCNC: 2.3 MG/DL — SIGNIFICANT CHANGE UP (ref 1.6–2.6)
MAGNESIUM SERPL-MCNC: 2.3 MG/DL — SIGNIFICANT CHANGE UP (ref 1.8–2.6)
MAGNESIUM SERPL-MCNC: 2.5 MG/DL — SIGNIFICANT CHANGE UP (ref 1.6–2.6)
MAGNESIUM SERPL-MCNC: 2.6 MG/DL — SIGNIFICANT CHANGE UP (ref 1.6–2.6)
MAGNESIUM SERPL-MCNC: 2.6 MG/DL — SIGNIFICANT CHANGE UP (ref 1.8–2.6)
MAGNESIUM SERPL-MCNC: 2.7 MG/DL — HIGH (ref 1.6–2.6)
MAGNESIUM SERPL-MCNC: 2.7 MG/DL — HIGH (ref 1.8–2.6)
MAGNESIUM SERPL-MCNC: 2.8 MG/DL — HIGH (ref 1.6–2.6)
MAGNESIUM SERPL-MCNC: 2.8 MG/DL — HIGH (ref 1.6–2.6)
MAGNESIUM SERPL-MCNC: 2.9 MG/DL — HIGH (ref 1.6–2.6)
MANUAL SMEAR VERIFICATION: SIGNIFICANT CHANGE UP
MCHC RBC-ENTMCNC: 27.9 PG — SIGNIFICANT CHANGE UP (ref 27–34)
MCHC RBC-ENTMCNC: 28.3 PG — SIGNIFICANT CHANGE UP (ref 27–34)
MCHC RBC-ENTMCNC: 28.7 PG — SIGNIFICANT CHANGE UP (ref 27–34)
MCHC RBC-ENTMCNC: 28.8 PG — SIGNIFICANT CHANGE UP (ref 27–34)
MCHC RBC-ENTMCNC: 28.8 PG — SIGNIFICANT CHANGE UP (ref 27–34)
MCHC RBC-ENTMCNC: 28.9 PG — SIGNIFICANT CHANGE UP (ref 27–34)
MCHC RBC-ENTMCNC: 29 PG — SIGNIFICANT CHANGE UP (ref 27–34)
MCHC RBC-ENTMCNC: 29.1 PG — SIGNIFICANT CHANGE UP (ref 27–34)
MCHC RBC-ENTMCNC: 29.2 PG — SIGNIFICANT CHANGE UP (ref 27–34)
MCHC RBC-ENTMCNC: 29.3 PG — SIGNIFICANT CHANGE UP (ref 27–34)
MCHC RBC-ENTMCNC: 29.3 PG — SIGNIFICANT CHANGE UP (ref 27–34)
MCHC RBC-ENTMCNC: 29.4 PG — SIGNIFICANT CHANGE UP (ref 27–34)
MCHC RBC-ENTMCNC: 29.4 PG — SIGNIFICANT CHANGE UP (ref 27–34)
MCHC RBC-ENTMCNC: 29.5 PG — SIGNIFICANT CHANGE UP (ref 27–34)
MCHC RBC-ENTMCNC: 29.6 PG — SIGNIFICANT CHANGE UP (ref 27–34)
MCHC RBC-ENTMCNC: 29.7 PG — SIGNIFICANT CHANGE UP (ref 27–34)
MCHC RBC-ENTMCNC: 29.8 PG — SIGNIFICANT CHANGE UP (ref 27–34)
MCHC RBC-ENTMCNC: 30 GM/DL — LOW (ref 32–36)
MCHC RBC-ENTMCNC: 30 PG — SIGNIFICANT CHANGE UP (ref 27–34)
MCHC RBC-ENTMCNC: 30.1 PG — SIGNIFICANT CHANGE UP (ref 27–34)
MCHC RBC-ENTMCNC: 30.2 PG — SIGNIFICANT CHANGE UP (ref 27–34)
MCHC RBC-ENTMCNC: 30.5 PG — SIGNIFICANT CHANGE UP (ref 27–34)
MCHC RBC-ENTMCNC: 30.5 PG — SIGNIFICANT CHANGE UP (ref 27–34)
MCHC RBC-ENTMCNC: 30.7 GM/DL — LOW (ref 32–36)
MCHC RBC-ENTMCNC: 30.9 GM/DL — LOW (ref 32–36)
MCHC RBC-ENTMCNC: 31.5 GM/DL — LOW (ref 32–36)
MCHC RBC-ENTMCNC: 31.5 GM/DL — LOW (ref 32–36)
MCHC RBC-ENTMCNC: 31.6 GM/DL — LOW (ref 32–36)
MCHC RBC-ENTMCNC: 31.6 GM/DL — LOW (ref 32–36)
MCHC RBC-ENTMCNC: 31.7 GM/DL — LOW (ref 32–36)
MCHC RBC-ENTMCNC: 31.8 GM/DL — LOW (ref 32–36)
MCHC RBC-ENTMCNC: 32.2 GM/DL — SIGNIFICANT CHANGE UP (ref 32–36)
MCHC RBC-ENTMCNC: 32.2 GM/DL — SIGNIFICANT CHANGE UP (ref 32–36)
MCHC RBC-ENTMCNC: 32.3 GM/DL — SIGNIFICANT CHANGE UP (ref 32–36)
MCHC RBC-ENTMCNC: 32.4 GM/DL — SIGNIFICANT CHANGE UP (ref 32–36)
MCHC RBC-ENTMCNC: 32.5 GM/DL — SIGNIFICANT CHANGE UP (ref 32–36)
MCHC RBC-ENTMCNC: 32.5 GM/DL — SIGNIFICANT CHANGE UP (ref 32–36)
MCHC RBC-ENTMCNC: 32.8 GM/DL — SIGNIFICANT CHANGE UP (ref 32–36)
MCHC RBC-ENTMCNC: 32.9 GM/DL — SIGNIFICANT CHANGE UP (ref 32–36)
MCHC RBC-ENTMCNC: 33 GM/DL — SIGNIFICANT CHANGE UP (ref 32–36)
MCHC RBC-ENTMCNC: 33.2 GM/DL — SIGNIFICANT CHANGE UP (ref 32–36)
MCHC RBC-ENTMCNC: 33.3 GM/DL — SIGNIFICANT CHANGE UP (ref 32–36)
MCHC RBC-ENTMCNC: 33.6 GM/DL — SIGNIFICANT CHANGE UP (ref 32–36)
MCHC RBC-ENTMCNC: 34.3 GM/DL — SIGNIFICANT CHANGE UP (ref 32–36)
MCV RBC AUTO: 87.5 FL — SIGNIFICANT CHANGE UP (ref 80–100)
MCV RBC AUTO: 87.7 FL — SIGNIFICANT CHANGE UP (ref 80–100)
MCV RBC AUTO: 87.8 FL — SIGNIFICANT CHANGE UP (ref 80–100)
MCV RBC AUTO: 88 FL — SIGNIFICANT CHANGE UP (ref 80–100)
MCV RBC AUTO: 88.1 FL — SIGNIFICANT CHANGE UP (ref 80–100)
MCV RBC AUTO: 88.2 FL — SIGNIFICANT CHANGE UP (ref 80–100)
MCV RBC AUTO: 88.2 FL — SIGNIFICANT CHANGE UP (ref 80–100)
MCV RBC AUTO: 88.6 FL — SIGNIFICANT CHANGE UP (ref 80–100)
MCV RBC AUTO: 88.7 FL — SIGNIFICANT CHANGE UP (ref 80–100)
MCV RBC AUTO: 88.7 FL — SIGNIFICANT CHANGE UP (ref 80–100)
MCV RBC AUTO: 89.2 FL — SIGNIFICANT CHANGE UP (ref 80–100)
MCV RBC AUTO: 90 FL — SIGNIFICANT CHANGE UP (ref 80–100)
MCV RBC AUTO: 90.9 FL — SIGNIFICANT CHANGE UP (ref 80–100)
MCV RBC AUTO: 91.5 FL — SIGNIFICANT CHANGE UP (ref 80–100)
MCV RBC AUTO: 92.9 FL — SIGNIFICANT CHANGE UP (ref 80–100)
MCV RBC AUTO: 93.1 FL — SIGNIFICANT CHANGE UP (ref 80–100)
MCV RBC AUTO: 93.6 FL — SIGNIFICANT CHANGE UP (ref 80–100)
MCV RBC AUTO: 94.8 FL — SIGNIFICANT CHANGE UP (ref 80–100)
MCV RBC AUTO: 94.8 FL — SIGNIFICANT CHANGE UP (ref 80–100)
MCV RBC AUTO: 95 FL — SIGNIFICANT CHANGE UP (ref 80–100)
MCV RBC AUTO: 95 FL — SIGNIFICANT CHANGE UP (ref 80–100)
MCV RBC AUTO: 95.8 FL — SIGNIFICANT CHANGE UP (ref 80–100)
MCV RBC AUTO: 96.3 FL — SIGNIFICANT CHANGE UP (ref 80–100)
MCV RBC AUTO: 96.3 FL — SIGNIFICANT CHANGE UP (ref 80–100)
MCV RBC AUTO: 96.5 FL — SIGNIFICANT CHANGE UP (ref 80–100)
MCV RBC AUTO: 97.3 FL — SIGNIFICANT CHANGE UP (ref 80–100)
METAMYELOCYTES # FLD: 1.7 % — HIGH (ref 0–0)
METAMYELOCYTES # FLD: 2.7 % — HIGH (ref 0–0)
METAMYELOCYTES # FLD: 4.4 % — HIGH (ref 0–0)
MICROCYTES BLD QL: SLIGHT — SIGNIFICANT CHANGE UP
MICROCYTES BLD QL: SLIGHT — SIGNIFICANT CHANGE UP
MONOCYTES # BLD AUTO: 0.12 K/UL — SIGNIFICANT CHANGE UP (ref 0–0.9)
MONOCYTES # BLD AUTO: 0.23 K/UL — SIGNIFICANT CHANGE UP (ref 0–0.9)
MONOCYTES # BLD AUTO: 0.31 K/UL — SIGNIFICANT CHANGE UP (ref 0–0.9)
MONOCYTES # BLD AUTO: 0.4 K/UL — SIGNIFICANT CHANGE UP (ref 0–0.9)
MONOCYTES # BLD AUTO: 0.55 K/UL — SIGNIFICANT CHANGE UP (ref 0–0.9)
MONOCYTES # BLD AUTO: 0.64 K/UL — SIGNIFICANT CHANGE UP (ref 0–0.9)
MONOCYTES # BLD AUTO: 0.65 K/UL — SIGNIFICANT CHANGE UP (ref 0–0.9)
MONOCYTES # BLD AUTO: 0.65 K/UL — SIGNIFICANT CHANGE UP (ref 0–0.9)
MONOCYTES # BLD AUTO: 0.67 K/UL — SIGNIFICANT CHANGE UP (ref 0–0.9)
MONOCYTES # BLD AUTO: 0.72 K/UL — SIGNIFICANT CHANGE UP (ref 0–0.9)
MONOCYTES # BLD AUTO: 0.78 K/UL — SIGNIFICANT CHANGE UP (ref 0–0.9)
MONOCYTES # BLD AUTO: 1 K/UL — HIGH (ref 0–0.9)
MONOCYTES # BLD AUTO: 1.16 K/UL — HIGH (ref 0–0.9)
MONOCYTES # BLD AUTO: 1.58 K/UL — HIGH (ref 0–0.9)
MONOCYTES NFR BLD AUTO: 1.7 % — LOW (ref 2–14)
MONOCYTES NFR BLD AUTO: 2.6 % — SIGNIFICANT CHANGE UP (ref 2–14)
MONOCYTES NFR BLD AUTO: 2.6 % — SIGNIFICANT CHANGE UP (ref 2–14)
MONOCYTES NFR BLD AUTO: 2.7 % — SIGNIFICANT CHANGE UP (ref 2–14)
MONOCYTES NFR BLD AUTO: 2.7 % — SIGNIFICANT CHANGE UP (ref 2–14)
MONOCYTES NFR BLD AUTO: 3.5 % — SIGNIFICANT CHANGE UP (ref 2–14)
MONOCYTES NFR BLD AUTO: 3.7 % — SIGNIFICANT CHANGE UP (ref 2–14)
MONOCYTES NFR BLD AUTO: 3.9 % — SIGNIFICANT CHANGE UP (ref 2–14)
MONOCYTES NFR BLD AUTO: 4.7 % — SIGNIFICANT CHANGE UP (ref 2–14)
MONOCYTES NFR BLD AUTO: 4.8 % — SIGNIFICANT CHANGE UP (ref 2–14)
MONOCYTES NFR BLD AUTO: 4.8 % — SIGNIFICANT CHANGE UP (ref 2–14)
MONOCYTES NFR BLD AUTO: 5.2 % — SIGNIFICANT CHANGE UP (ref 2–14)
MONOCYTES NFR BLD AUTO: 5.3 % — SIGNIFICANT CHANGE UP (ref 2–14)
MONOCYTES NFR BLD AUTO: 7.4 % — SIGNIFICANT CHANGE UP (ref 2–14)
MYELOCYTES NFR BLD: 0.9 % — HIGH (ref 0–0)
MYELOCYTES NFR BLD: 0.9 % — HIGH (ref 0–0)
NEUTROPHILS # BLD AUTO: 14.12 K/UL — HIGH (ref 1.8–7.4)
NEUTROPHILS # BLD AUTO: 15.23 K/UL — HIGH (ref 1.8–7.4)
NEUTROPHILS # BLD AUTO: 16.33 K/UL — HIGH (ref 1.8–7.4)
NEUTROPHILS # BLD AUTO: 16.78 K/UL — HIGH (ref 1.8–7.4)
NEUTROPHILS # BLD AUTO: 17 K/UL — HIGH (ref 1.8–7.4)
NEUTROPHILS # BLD AUTO: 17.49 K/UL — HIGH (ref 1.8–7.4)
NEUTROPHILS # BLD AUTO: 21.59 K/UL — HIGH (ref 1.8–7.4)
NEUTROPHILS # BLD AUTO: 24.19 K/UL — HIGH (ref 1.8–7.4)
NEUTROPHILS # BLD AUTO: 25.15 K/UL — HIGH (ref 1.8–7.4)
NEUTROPHILS # BLD AUTO: 4.68 K/UL — SIGNIFICANT CHANGE UP (ref 1.8–7.4)
NEUTROPHILS # BLD AUTO: 6.24 K/UL — SIGNIFICANT CHANGE UP (ref 1.8–7.4)
NEUTROPHILS # BLD AUTO: 7.01 K/UL — SIGNIFICANT CHANGE UP (ref 1.8–7.4)
NEUTROPHILS # BLD AUTO: 7.09 K/UL — SIGNIFICANT CHANGE UP (ref 1.8–7.4)
NEUTROPHILS # BLD AUTO: 9.66 K/UL — HIGH (ref 1.8–7.4)
NEUTROPHILS NFR BLD AUTO: 78 % — HIGH (ref 43–77)
NEUTROPHILS NFR BLD AUTO: 79.1 % — HIGH (ref 43–77)
NEUTROPHILS NFR BLD AUTO: 79.1 % — HIGH (ref 43–77)
NEUTROPHILS NFR BLD AUTO: 79.6 % — HIGH (ref 43–77)
NEUTROPHILS NFR BLD AUTO: 79.8 % — HIGH (ref 43–77)
NEUTROPHILS NFR BLD AUTO: 80.2 % — HIGH (ref 43–77)
NEUTROPHILS NFR BLD AUTO: 81.7 % — HIGH (ref 43–77)
NEUTROPHILS NFR BLD AUTO: 83.5 % — HIGH (ref 43–77)
NEUTROPHILS NFR BLD AUTO: 84.2 % — HIGH (ref 43–77)
NEUTROPHILS NFR BLD AUTO: 84.5 % — HIGH (ref 43–77)
NEUTROPHILS NFR BLD AUTO: 85.7 % — HIGH (ref 43–77)
NEUTROPHILS NFR BLD AUTO: 88.7 % — HIGH (ref 43–77)
NEUTROPHILS NFR BLD AUTO: 89.3 % — HIGH (ref 43–77)
NEUTROPHILS NFR BLD AUTO: 90.4 % — HIGH (ref 43–77)
NEUTS BAND # BLD: 0.9 % — SIGNIFICANT CHANGE UP (ref 0–8)
NEUTS BAND # BLD: 1.8 % — SIGNIFICANT CHANGE UP (ref 0–8)
NEUTS BAND # BLD: 5.2 % — SIGNIFICANT CHANGE UP (ref 0–8)
NITRITE UR-MCNC: NEGATIVE — SIGNIFICANT CHANGE UP
NRBC # BLD: 1 /100 WBCS — HIGH (ref 0–0)
NRBC # BLD: 1 /100 — HIGH (ref 0–0)
NRBC # BLD: 10 /100 WBCS — HIGH (ref 0–0)
NRBC # BLD: 11 /100 WBCS — HIGH (ref 0–0)
NRBC # BLD: 19 /100 WBCS — HIGH (ref 0–0)
NRBC # BLD: 2 /100 WBCS — HIGH (ref 0–0)
NRBC # BLD: 20 /100 WBCS — HIGH (ref 0–0)
NRBC # BLD: 21 /100 WBCS — HIGH (ref 0–0)
NRBC # BLD: 3 /100 — HIGH (ref 0–0)
OVALOCYTES BLD QL SMEAR: SLIGHT — SIGNIFICANT CHANGE UP
OVALOCYTES BLD QL SMEAR: SLIGHT — SIGNIFICANT CHANGE UP
PCO2 BLDA: 35 MMHG — SIGNIFICANT CHANGE UP (ref 35–45)
PCO2 BLDA: 42 MMHG — SIGNIFICANT CHANGE UP (ref 35–45)
PCO2 BLDA: 48 MMHG — HIGH (ref 35–45)
PCO2 BLDA: 54 MMHG — HIGH (ref 35–45)
PCO2 BLDA: 64 MMHG — HIGH (ref 35–45)
PH BLDA: 7.23 — LOW (ref 7.35–7.45)
PH BLDA: 7.32 — LOW (ref 7.35–7.45)
PH BLDA: 7.32 — LOW (ref 7.35–7.45)
PH BLDA: 7.39 — SIGNIFICANT CHANGE UP (ref 7.35–7.45)
PH BLDA: 7.48 — HIGH (ref 7.35–7.45)
PH UR: 6 — SIGNIFICANT CHANGE UP (ref 5–8)
PHOSPHATE SERPL-MCNC: 3 MG/DL — SIGNIFICANT CHANGE UP (ref 2.4–4.7)
PHOSPHATE SERPL-MCNC: 3.6 MG/DL — SIGNIFICANT CHANGE UP (ref 2.4–4.7)
PHOSPHATE SERPL-MCNC: 3.8 MG/DL — SIGNIFICANT CHANGE UP (ref 2.4–4.7)
PHOSPHATE SERPL-MCNC: 4.2 MG/DL — SIGNIFICANT CHANGE UP (ref 2.4–4.7)
PHOSPHATE SERPL-MCNC: 4.7 MG/DL — SIGNIFICANT CHANGE UP (ref 2.4–4.7)
PHOSPHATE SERPL-MCNC: 5.2 MG/DL — HIGH (ref 2.4–4.7)
PHOSPHATE SERPL-MCNC: 5.4 MG/DL — HIGH (ref 2.4–4.7)
PHOSPHATE SERPL-MCNC: 5.9 MG/DL — HIGH (ref 2.4–4.7)
PHOSPHATE SERPL-MCNC: 6 MG/DL — HIGH (ref 2.4–4.7)
PHOSPHATE SERPL-MCNC: 6.5 MG/DL — HIGH (ref 2.4–4.7)
PHOSPHATE SERPL-MCNC: 7.3 MG/DL — HIGH (ref 2.4–4.7)
PHOSPHATE SERPL-MCNC: 7.8 MG/DL — HIGH (ref 2.4–4.7)
PHOSPHATE SERPL-MCNC: 8.8 MG/DL — HIGH (ref 2.4–4.7)
PHOSPHATE SERPL-MCNC: 9 MG/DL — HIGH (ref 2.4–4.7)
PLAT MORPH BLD: ABNORMAL
PLAT MORPH BLD: NORMAL — SIGNIFICANT CHANGE UP
PLATELET # BLD AUTO: 143 K/UL — LOW (ref 150–400)
PLATELET # BLD AUTO: 146 K/UL — LOW (ref 150–400)
PLATELET # BLD AUTO: 153 K/UL — SIGNIFICANT CHANGE UP (ref 150–400)
PLATELET # BLD AUTO: 163 K/UL — SIGNIFICANT CHANGE UP (ref 150–400)
PLATELET # BLD AUTO: 191 K/UL — SIGNIFICANT CHANGE UP (ref 150–400)
PLATELET # BLD AUTO: 202 K/UL — SIGNIFICANT CHANGE UP (ref 150–400)
PLATELET # BLD AUTO: 204 K/UL — SIGNIFICANT CHANGE UP (ref 150–400)
PLATELET # BLD AUTO: 210 K/UL — SIGNIFICANT CHANGE UP (ref 150–400)
PLATELET # BLD AUTO: 225 K/UL — SIGNIFICANT CHANGE UP (ref 150–400)
PLATELET # BLD AUTO: 231 K/UL — SIGNIFICANT CHANGE UP (ref 150–400)
PLATELET # BLD AUTO: 234 K/UL — SIGNIFICANT CHANGE UP (ref 150–400)
PLATELET # BLD AUTO: 245 K/UL — SIGNIFICANT CHANGE UP (ref 150–400)
PLATELET # BLD AUTO: 250 K/UL — SIGNIFICANT CHANGE UP (ref 150–400)
PLATELET # BLD AUTO: 253 K/UL — SIGNIFICANT CHANGE UP (ref 150–400)
PLATELET # BLD AUTO: 259 K/UL — SIGNIFICANT CHANGE UP (ref 150–400)
PLATELET # BLD AUTO: 268 K/UL — SIGNIFICANT CHANGE UP (ref 150–400)
PLATELET # BLD AUTO: 274 K/UL — SIGNIFICANT CHANGE UP (ref 150–400)
PLATELET # BLD AUTO: 276 K/UL — SIGNIFICANT CHANGE UP (ref 150–400)
PLATELET # BLD AUTO: 287 K/UL — SIGNIFICANT CHANGE UP (ref 150–400)
PLATELET # BLD AUTO: 289 K/UL — SIGNIFICANT CHANGE UP (ref 150–400)
PLATELET # BLD AUTO: 293 K/UL — SIGNIFICANT CHANGE UP (ref 150–400)
PLATELET # BLD AUTO: 302 K/UL — SIGNIFICANT CHANGE UP (ref 150–400)
PLATELET # BLD AUTO: 302 K/UL — SIGNIFICANT CHANGE UP (ref 150–400)
PLATELET # BLD AUTO: 308 K/UL — SIGNIFICANT CHANGE UP (ref 150–400)
PLATELET # BLD AUTO: 325 K/UL — SIGNIFICANT CHANGE UP (ref 150–400)
PLATELET # BLD AUTO: 325 K/UL — SIGNIFICANT CHANGE UP (ref 150–400)
PLATELET # BLD AUTO: 353 K/UL — SIGNIFICANT CHANGE UP (ref 150–400)
PLATELET # BLD AUTO: 467 K/UL — HIGH (ref 150–400)
PO2 BLDA: 54 MMHG — LOW (ref 83–108)
PO2 BLDA: 61 MMHG — LOW (ref 83–108)
PO2 BLDA: 68 MMHG — LOW (ref 83–108)
PO2 BLDA: 71 MMHG — LOW (ref 83–108)
PO2 BLDA: 84 MMHG — SIGNIFICANT CHANGE UP (ref 83–108)
POIKILOCYTOSIS BLD QL AUTO: SLIGHT — SIGNIFICANT CHANGE UP
POLYCHROMASIA BLD QL SMEAR: SIGNIFICANT CHANGE UP
POLYCHROMASIA BLD QL SMEAR: SIGNIFICANT CHANGE UP
POLYCHROMASIA BLD QL SMEAR: SLIGHT — SIGNIFICANT CHANGE UP
POLYCHROMASIA BLD QL SMEAR: SLIGHT — SIGNIFICANT CHANGE UP
POTASSIUM SERPL-MCNC: 3.7 MMOL/L — SIGNIFICANT CHANGE UP (ref 3.5–5.3)
POTASSIUM SERPL-MCNC: 3.8 MMOL/L — SIGNIFICANT CHANGE UP (ref 3.5–5.3)
POTASSIUM SERPL-MCNC: 4 MMOL/L — SIGNIFICANT CHANGE UP (ref 3.5–5.3)
POTASSIUM SERPL-MCNC: 4.1 MMOL/L — SIGNIFICANT CHANGE UP (ref 3.5–5.3)
POTASSIUM SERPL-MCNC: 4.1 MMOL/L — SIGNIFICANT CHANGE UP (ref 3.5–5.3)
POTASSIUM SERPL-MCNC: 4.2 MMOL/L — SIGNIFICANT CHANGE UP (ref 3.5–5.3)
POTASSIUM SERPL-MCNC: 4.3 MMOL/L — SIGNIFICANT CHANGE UP (ref 3.5–5.3)
POTASSIUM SERPL-MCNC: 4.4 MMOL/L — SIGNIFICANT CHANGE UP (ref 3.5–5.3)
POTASSIUM SERPL-MCNC: 4.5 MMOL/L — SIGNIFICANT CHANGE UP (ref 3.5–5.3)
POTASSIUM SERPL-MCNC: 4.6 MMOL/L — SIGNIFICANT CHANGE UP (ref 3.5–5.3)
POTASSIUM SERPL-MCNC: 4.7 MMOL/L — SIGNIFICANT CHANGE UP (ref 3.5–5.3)
POTASSIUM SERPL-MCNC: 4.7 MMOL/L — SIGNIFICANT CHANGE UP (ref 3.5–5.3)
POTASSIUM SERPL-MCNC: 4.8 MMOL/L — SIGNIFICANT CHANGE UP (ref 3.5–5.3)
POTASSIUM SERPL-MCNC: 4.9 MMOL/L — SIGNIFICANT CHANGE UP (ref 3.5–5.3)
POTASSIUM SERPL-MCNC: 5 MMOL/L — SIGNIFICANT CHANGE UP (ref 3.5–5.3)
POTASSIUM SERPL-MCNC: 5 MMOL/L — SIGNIFICANT CHANGE UP (ref 3.5–5.3)
POTASSIUM SERPL-MCNC: 5.2 MMOL/L — SIGNIFICANT CHANGE UP (ref 3.5–5.3)
POTASSIUM SERPL-MCNC: 5.7 MMOL/L — HIGH (ref 3.5–5.3)
POTASSIUM SERPL-MCNC: 5.9 MMOL/L — HIGH (ref 3.5–5.3)
POTASSIUM SERPL-SCNC: 3.7 MMOL/L — SIGNIFICANT CHANGE UP (ref 3.5–5.3)
POTASSIUM SERPL-SCNC: 3.8 MMOL/L — SIGNIFICANT CHANGE UP (ref 3.5–5.3)
POTASSIUM SERPL-SCNC: 4 MMOL/L — SIGNIFICANT CHANGE UP (ref 3.5–5.3)
POTASSIUM SERPL-SCNC: 4.1 MMOL/L — SIGNIFICANT CHANGE UP (ref 3.5–5.3)
POTASSIUM SERPL-SCNC: 4.1 MMOL/L — SIGNIFICANT CHANGE UP (ref 3.5–5.3)
POTASSIUM SERPL-SCNC: 4.2 MMOL/L — SIGNIFICANT CHANGE UP (ref 3.5–5.3)
POTASSIUM SERPL-SCNC: 4.3 MMOL/L — SIGNIFICANT CHANGE UP (ref 3.5–5.3)
POTASSIUM SERPL-SCNC: 4.4 MMOL/L — SIGNIFICANT CHANGE UP (ref 3.5–5.3)
POTASSIUM SERPL-SCNC: 4.5 MMOL/L — SIGNIFICANT CHANGE UP (ref 3.5–5.3)
POTASSIUM SERPL-SCNC: 4.6 MMOL/L — SIGNIFICANT CHANGE UP (ref 3.5–5.3)
POTASSIUM SERPL-SCNC: 4.7 MMOL/L — SIGNIFICANT CHANGE UP (ref 3.5–5.3)
POTASSIUM SERPL-SCNC: 4.7 MMOL/L — SIGNIFICANT CHANGE UP (ref 3.5–5.3)
POTASSIUM SERPL-SCNC: 4.8 MMOL/L — SIGNIFICANT CHANGE UP (ref 3.5–5.3)
POTASSIUM SERPL-SCNC: 4.9 MMOL/L — SIGNIFICANT CHANGE UP (ref 3.5–5.3)
POTASSIUM SERPL-SCNC: 5 MMOL/L — SIGNIFICANT CHANGE UP (ref 3.5–5.3)
POTASSIUM SERPL-SCNC: 5 MMOL/L — SIGNIFICANT CHANGE UP (ref 3.5–5.3)
POTASSIUM SERPL-SCNC: 5.2 MMOL/L — SIGNIFICANT CHANGE UP (ref 3.5–5.3)
POTASSIUM SERPL-SCNC: 5.7 MMOL/L — HIGH (ref 3.5–5.3)
POTASSIUM SERPL-SCNC: 5.9 MMOL/L — HIGH (ref 3.5–5.3)
PROCALCITONIN SERPL-MCNC: 0.21 NG/ML — HIGH (ref 0.02–0.1)
PROCALCITONIN SERPL-MCNC: 0.22 NG/ML — HIGH (ref 0.02–0.1)
PROCALCITONIN SERPL-MCNC: 0.24 NG/ML — HIGH (ref 0.02–0.1)
PROCALCITONIN SERPL-MCNC: 0.27 NG/ML — HIGH (ref 0.02–0.1)
PROCALCITONIN SERPL-MCNC: 0.41 NG/ML — HIGH (ref 0.02–0.1)
PROCALCITONIN SERPL-MCNC: 0.48 NG/ML — HIGH (ref 0.02–0.1)
PROCALCITONIN SERPL-MCNC: 0.67 NG/ML — HIGH (ref 0.02–0.1)
PROCALCITONIN SERPL-MCNC: 2.79 NG/ML — HIGH (ref 0.02–0.1)
PROCALCITONIN SERPL-MCNC: 8.66 NG/ML — HIGH (ref 0.02–0.1)
PROT SERPL-MCNC: 4.9 G/DL — LOW (ref 6.6–8.7)
PROT SERPL-MCNC: 5.4 G/DL — LOW (ref 6.6–8.7)
PROT SERPL-MCNC: 5.5 G/DL — LOW (ref 6.6–8.7)
PROT SERPL-MCNC: 5.5 G/DL — LOW (ref 6.6–8.7)
PROT SERPL-MCNC: 5.6 G/DL — LOW (ref 6.6–8.7)
PROT SERPL-MCNC: 5.6 G/DL — LOW (ref 6.6–8.7)
PROT SERPL-MCNC: 5.9 G/DL — LOW (ref 6.6–8.7)
PROT SERPL-MCNC: 6 G/DL — LOW (ref 6.6–8.7)
PROT SERPL-MCNC: 6.1 G/DL — LOW (ref 6.6–8.7)
PROT SERPL-MCNC: 6.3 G/DL — LOW (ref 6.6–8.7)
PROT SERPL-MCNC: 6.4 G/DL — LOW (ref 6.6–8.7)
PROT SERPL-MCNC: 6.5 G/DL — LOW (ref 6.6–8.7)
PROT SERPL-MCNC: 6.6 G/DL — SIGNIFICANT CHANGE UP (ref 6.6–8.7)
PROT SERPL-MCNC: 6.6 G/DL — SIGNIFICANT CHANGE UP (ref 6.6–8.7)
PROT SERPL-MCNC: 6.7 G/DL — SIGNIFICANT CHANGE UP (ref 6.6–8.7)
PROT SERPL-MCNC: 6.8 G/DL — SIGNIFICANT CHANGE UP (ref 6.6–8.7)
PROT SERPL-MCNC: 6.8 G/DL — SIGNIFICANT CHANGE UP (ref 6.6–8.7)
PROT SERPL-MCNC: 6.9 G/DL — SIGNIFICANT CHANGE UP (ref 6.6–8.7)
PROT SERPL-MCNC: 7 G/DL — SIGNIFICANT CHANGE UP (ref 6.6–8.7)
PROT SERPL-MCNC: 7.1 G/DL — SIGNIFICANT CHANGE UP (ref 6.6–8.7)
PROT UR-MCNC: 30 MG/DL
PROTHROM AB SERPL-ACNC: 14.6 SEC — HIGH (ref 10.6–13.6)
RAPID RVP RESULT: SIGNIFICANT CHANGE UP
RBC # BLD: 2.12 M/UL — LOW (ref 3.8–5.2)
RBC # BLD: 2.23 M/UL — LOW (ref 3.8–5.2)
RBC # BLD: 2.39 M/UL — LOW (ref 3.8–5.2)
RBC # BLD: 2.39 M/UL — LOW (ref 3.8–5.2)
RBC # BLD: 2.43 M/UL — LOW (ref 3.8–5.2)
RBC # BLD: 2.43 M/UL — LOW (ref 3.8–5.2)
RBC # BLD: 2.6 M/UL — LOW (ref 3.8–5.2)
RBC # BLD: 2.6 M/UL — LOW (ref 3.8–5.2)
RBC # BLD: 2.64 M/UL — LOW (ref 3.8–5.2)
RBC # BLD: 2.65 M/UL — LOW (ref 3.8–5.2)
RBC # BLD: 2.71 M/UL — LOW (ref 3.8–5.2)
RBC # BLD: 2.82 M/UL — LOW (ref 3.8–5.2)
RBC # BLD: 2.86 M/UL — LOW (ref 3.8–5.2)
RBC # BLD: 2.86 M/UL — LOW (ref 3.8–5.2)
RBC # BLD: 3.12 M/UL — LOW (ref 3.8–5.2)
RBC # BLD: 3.14 M/UL — LOW (ref 3.8–5.2)
RBC # BLD: 3.25 M/UL — LOW (ref 3.8–5.2)
RBC # BLD: 3.44 M/UL — LOW (ref 3.8–5.2)
RBC # BLD: 3.52 M/UL — LOW (ref 3.8–5.2)
RBC # BLD: 3.6 M/UL — LOW (ref 3.8–5.2)
RBC # BLD: 3.64 M/UL — LOW (ref 3.8–5.2)
RBC # BLD: 3.68 M/UL — LOW (ref 3.8–5.2)
RBC # BLD: 3.73 M/UL — LOW (ref 3.8–5.2)
RBC # BLD: 3.77 M/UL — LOW (ref 3.8–5.2)
RBC # BLD: 3.91 M/UL — SIGNIFICANT CHANGE UP (ref 3.8–5.2)
RBC # BLD: 3.93 M/UL — SIGNIFICANT CHANGE UP (ref 3.8–5.2)
RBC # BLD: 4 M/UL — SIGNIFICANT CHANGE UP (ref 3.8–5.2)
RBC # BLD: 4.06 M/UL — SIGNIFICANT CHANGE UP (ref 3.8–5.2)
RBC # FLD: 12.8 % — SIGNIFICANT CHANGE UP (ref 10.3–14.5)
RBC # FLD: 12.9 % — SIGNIFICANT CHANGE UP (ref 10.3–14.5)
RBC # FLD: 13.1 % — SIGNIFICANT CHANGE UP (ref 10.3–14.5)
RBC # FLD: 13.2 % — SIGNIFICANT CHANGE UP (ref 10.3–14.5)
RBC # FLD: 13.2 % — SIGNIFICANT CHANGE UP (ref 10.3–14.5)
RBC # FLD: 13.4 % — SIGNIFICANT CHANGE UP (ref 10.3–14.5)
RBC # FLD: 13.6 % — SIGNIFICANT CHANGE UP (ref 10.3–14.5)
RBC # FLD: 13.8 % — SIGNIFICANT CHANGE UP (ref 10.3–14.5)
RBC # FLD: 14.1 % — SIGNIFICANT CHANGE UP (ref 10.3–14.5)
RBC # FLD: 14.2 % — SIGNIFICANT CHANGE UP (ref 10.3–14.5)
RBC # FLD: 14.9 % — HIGH (ref 10.3–14.5)
RBC # FLD: 15.8 % — HIGH (ref 10.3–14.5)
RBC # FLD: 15.9 % — HIGH (ref 10.3–14.5)
RBC # FLD: 15.9 % — HIGH (ref 10.3–14.5)
RBC # FLD: 16.1 % — HIGH (ref 10.3–14.5)
RBC # FLD: 21.8 % — HIGH (ref 10.3–14.5)
RBC # FLD: 22.5 % — HIGH (ref 10.3–14.5)
RBC # FLD: 22.9 % — HIGH (ref 10.3–14.5)
RBC # FLD: 23 % — HIGH (ref 10.3–14.5)
RBC # FLD: 23.1 % — HIGH (ref 10.3–14.5)
RBC # FLD: 23.3 % — HIGH (ref 10.3–14.5)
RBC # FLD: 23.9 % — HIGH (ref 10.3–14.5)
RBC # FLD: 24.3 % — HIGH (ref 10.3–14.5)
RBC # FLD: SIGNIFICANT CHANGE UP (ref 10.3–14.5)
RBC BLD AUTO: ABNORMAL
RBC BLD AUTO: NORMAL — SIGNIFICANT CHANGE UP
RBC CASTS # UR COMP ASSIST: SIGNIFICANT CHANGE UP /HPF (ref 0–4)
ROULEAUX BLD QL SMEAR: PRESENT
RSV RNA NPH QL NAA+NON-PROBE: SIGNIFICANT CHANGE UP
RV+EV RNA SPEC QL NAA+PROBE: SIGNIFICANT CHANGE UP
SAO2 % BLDA: 89 % — LOW (ref 92–96)
SAO2 % BLDA: 91 % — LOW (ref 92–96)
SAO2 % BLDA: 94 % — SIGNIFICANT CHANGE UP (ref 92–96)
SAO2 % BLDA: 95 % — SIGNIFICANT CHANGE UP (ref 95–99)
SAO2 % BLDA: 96 % — SIGNIFICANT CHANGE UP (ref 92–96)
SARS-COV-2 RNA SPEC QL NAA+PROBE: DETECTED
SARS-COV-2 RNA SPEC QL NAA+PROBE: DETECTED
SODIUM SERPL-SCNC: 130 MMOL/L — LOW (ref 135–145)
SODIUM SERPL-SCNC: 131 MMOL/L — LOW (ref 135–145)
SODIUM SERPL-SCNC: 132 MMOL/L — LOW (ref 135–145)
SODIUM SERPL-SCNC: 133 MMOL/L — LOW (ref 135–145)
SODIUM SERPL-SCNC: 134 MMOL/L — LOW (ref 135–145)
SODIUM SERPL-SCNC: 135 MMOL/L — SIGNIFICANT CHANGE UP (ref 135–145)
SODIUM SERPL-SCNC: 136 MMOL/L — SIGNIFICANT CHANGE UP (ref 135–145)
SODIUM SERPL-SCNC: 137 MMOL/L — SIGNIFICANT CHANGE UP (ref 135–145)
SODIUM SERPL-SCNC: 138 MMOL/L — SIGNIFICANT CHANGE UP (ref 135–145)
SODIUM SERPL-SCNC: 139 MMOL/L — SIGNIFICANT CHANGE UP (ref 135–145)
SODIUM SERPL-SCNC: 141 MMOL/L — SIGNIFICANT CHANGE UP (ref 135–145)
SODIUM SERPL-SCNC: 142 MMOL/L — SIGNIFICANT CHANGE UP (ref 135–145)
SP GR SPEC: 1.01 — SIGNIFICANT CHANGE UP (ref 1.01–1.02)
SPECIMEN SOURCE: SIGNIFICANT CHANGE UP
SPHEROCYTES BLD QL SMEAR: SLIGHT — SIGNIFICANT CHANGE UP
SPHEROCYTES BLD QL SMEAR: SLIGHT — SIGNIFICANT CHANGE UP
TIBC SERPL-MCNC: 320 UG/DL — SIGNIFICANT CHANGE UP (ref 220–430)
TRANSFERRIN SERPL-MCNC: 224 MG/DL — SIGNIFICANT CHANGE UP (ref 192–382)
TRIGL SERPL-MCNC: 165 MG/DL — HIGH
TRIGL SERPL-MCNC: 195 MG/DL — HIGH
TRIGL SERPL-MCNC: 428 MG/DL — HIGH
TROPONIN T SERPL-MCNC: <0.01 NG/ML — SIGNIFICANT CHANGE UP (ref 0–0.06)
UROBILINOGEN FLD QL: NEGATIVE MG/DL — SIGNIFICANT CHANGE UP
VARIANT LYMPHS # BLD: 0.9 % — SIGNIFICANT CHANGE UP (ref 0–6)
VARIANT LYMPHS # BLD: 0.9 % — SIGNIFICANT CHANGE UP (ref 0–6)
WBC # BLD: 12.08 K/UL — HIGH (ref 3.8–10.5)
WBC # BLD: 14.54 K/UL — HIGH (ref 3.8–10.5)
WBC # BLD: 16.77 K/UL — HIGH (ref 3.8–10.5)
WBC # BLD: 16.98 K/UL — HIGH (ref 3.8–10.5)
WBC # BLD: 17.15 K/UL — HIGH (ref 3.8–10.5)
WBC # BLD: 18.23 K/UL — HIGH (ref 3.8–10.5)
WBC # BLD: 18.37 K/UL — HIGH (ref 3.8–10.5)
WBC # BLD: 18.41 K/UL — HIGH (ref 3.8–10.5)
WBC # BLD: 20.2 K/UL — HIGH (ref 3.8–10.5)
WBC # BLD: 21.02 K/UL — HIGH (ref 3.8–10.5)
WBC # BLD: 21.19 K/UL — HIGH (ref 3.8–10.5)
WBC # BLD: 21.81 K/UL — HIGH (ref 3.8–10.5)
WBC # BLD: 22.39 K/UL — HIGH (ref 3.8–10.5)
WBC # BLD: 23.61 K/UL — HIGH (ref 3.8–10.5)
WBC # BLD: 24.34 K/UL — HIGH (ref 3.8–10.5)
WBC # BLD: 24.85 K/UL — HIGH (ref 3.8–10.5)
WBC # BLD: 24.85 K/UL — HIGH (ref 3.8–10.5)
WBC # BLD: 26.55 K/UL — HIGH (ref 3.8–10.5)
WBC # BLD: 30.45 K/UL — HIGH (ref 3.8–10.5)
WBC # BLD: 39.35 K/UL — HIGH (ref 3.8–10.5)
WBC # BLD: 40.43 K/UL — CRITICAL HIGH (ref 3.8–10.5)
WBC # BLD: 40.82 K/UL — CRITICAL HIGH (ref 3.8–10.5)
WBC # BLD: 43.71 K/UL — CRITICAL HIGH (ref 3.8–10.5)
WBC # BLD: 46.51 K/UL — CRITICAL HIGH (ref 3.8–10.5)
WBC # BLD: 5.88 K/UL — SIGNIFICANT CHANGE UP (ref 3.8–10.5)
WBC # BLD: 6.83 K/UL — SIGNIFICANT CHANGE UP (ref 3.8–10.5)
WBC # BLD: 8.39 K/UL — SIGNIFICANT CHANGE UP (ref 3.8–10.5)
WBC # BLD: 8.75 K/UL — SIGNIFICANT CHANGE UP (ref 3.8–10.5)
WBC # FLD AUTO: 12.08 K/UL — HIGH (ref 3.8–10.5)
WBC # FLD AUTO: 14.54 K/UL — HIGH (ref 3.8–10.5)
WBC # FLD AUTO: 16.77 K/UL — HIGH (ref 3.8–10.5)
WBC # FLD AUTO: 16.98 K/UL — HIGH (ref 3.8–10.5)
WBC # FLD AUTO: 17.15 K/UL — HIGH (ref 3.8–10.5)
WBC # FLD AUTO: 18.23 K/UL — HIGH (ref 3.8–10.5)
WBC # FLD AUTO: 18.37 K/UL — HIGH (ref 3.8–10.5)
WBC # FLD AUTO: 18.41 K/UL — HIGH (ref 3.8–10.5)
WBC # FLD AUTO: 20.2 K/UL — HIGH (ref 3.8–10.5)
WBC # FLD AUTO: 21.02 K/UL — HIGH (ref 3.8–10.5)
WBC # FLD AUTO: 21.19 K/UL — HIGH (ref 3.8–10.5)
WBC # FLD AUTO: 21.81 K/UL — HIGH (ref 3.8–10.5)
WBC # FLD AUTO: 22.39 K/UL — HIGH (ref 3.8–10.5)
WBC # FLD AUTO: 23.61 K/UL — HIGH (ref 3.8–10.5)
WBC # FLD AUTO: 24.34 K/UL — HIGH (ref 3.8–10.5)
WBC # FLD AUTO: 24.85 K/UL — HIGH (ref 3.8–10.5)
WBC # FLD AUTO: 24.85 K/UL — HIGH (ref 3.8–10.5)
WBC # FLD AUTO: 26.55 K/UL — HIGH (ref 3.8–10.5)
WBC # FLD AUTO: 30.45 K/UL — HIGH (ref 3.8–10.5)
WBC # FLD AUTO: 39.35 K/UL — HIGH (ref 3.8–10.5)
WBC # FLD AUTO: 40.43 K/UL — CRITICAL HIGH (ref 3.8–10.5)
WBC # FLD AUTO: 40.82 K/UL — CRITICAL HIGH (ref 3.8–10.5)
WBC # FLD AUTO: 43.71 K/UL — CRITICAL HIGH (ref 3.8–10.5)
WBC # FLD AUTO: 46.51 K/UL — CRITICAL HIGH (ref 3.8–10.5)
WBC # FLD AUTO: 5.88 K/UL — SIGNIFICANT CHANGE UP (ref 3.8–10.5)
WBC # FLD AUTO: 6.83 K/UL — SIGNIFICANT CHANGE UP (ref 3.8–10.5)
WBC # FLD AUTO: 8.39 K/UL — SIGNIFICANT CHANGE UP (ref 3.8–10.5)
WBC # FLD AUTO: 8.75 K/UL — SIGNIFICANT CHANGE UP (ref 3.8–10.5)
WBC UR QL: SIGNIFICANT CHANGE UP

## 2021-01-01 PROCEDURE — 99233 SBSQ HOSP IP/OBS HIGH 50: CPT

## 2021-01-01 PROCEDURE — 71045 X-RAY EXAM CHEST 1 VIEW: CPT | Mod: 26

## 2021-01-01 PROCEDURE — 31500 INSERT EMERGENCY AIRWAY: CPT

## 2021-01-01 PROCEDURE — 99223 1ST HOSP IP/OBS HIGH 75: CPT

## 2021-01-01 PROCEDURE — 90937 HEMODIALYSIS REPEATED EVAL: CPT

## 2021-01-01 PROCEDURE — 99233 SBSQ HOSP IP/OBS HIGH 50: CPT | Mod: 25

## 2021-01-01 PROCEDURE — 93306 TTE W/DOPPLER COMPLETE: CPT | Mod: 26

## 2021-01-01 PROCEDURE — 71275 CT ANGIOGRAPHY CHEST: CPT | Mod: 26

## 2021-01-01 PROCEDURE — 93010 ELECTROCARDIOGRAM REPORT: CPT

## 2021-01-01 PROCEDURE — 99291 CRITICAL CARE FIRST HOUR: CPT

## 2021-01-01 PROCEDURE — 36013 PLACE CATHETER IN ARTERY: CPT

## 2021-01-01 PROCEDURE — 99285 EMERGENCY DEPT VISIT HI MDM: CPT

## 2021-01-01 PROCEDURE — 99231 SBSQ HOSP IP/OBS SF/LOW 25: CPT

## 2021-01-01 PROCEDURE — 99284 EMERGENCY DEPT VISIT MOD MDM: CPT

## 2021-01-01 PROCEDURE — 99053 MED SERV 10PM-8AM 24 HR FAC: CPT

## 2021-01-01 PROCEDURE — 99232 SBSQ HOSP IP/OBS MODERATE 35: CPT

## 2021-01-01 PROCEDURE — 93970 EXTREMITY STUDY: CPT | Mod: 26

## 2021-01-01 PROCEDURE — 31624 DX BRONCHOSCOPE/LAVAGE: CPT

## 2021-01-01 PROCEDURE — 99497 ADVNCD CARE PLAN 30 MIN: CPT | Mod: 25

## 2021-01-01 PROCEDURE — 99282 EMERGENCY DEPT VISIT SF MDM: CPT

## 2021-01-01 RX ORDER — CEFEPIME 1 G/1
INJECTION, POWDER, FOR SOLUTION INTRAMUSCULAR; INTRAVENOUS
Refills: 0 | Status: DISCONTINUED | OUTPATIENT
Start: 2021-01-01 | End: 2021-01-01

## 2021-01-01 RX ORDER — FAMOTIDINE 10 MG/ML
20 INJECTION INTRAVENOUS DAILY
Refills: 0 | Status: DISCONTINUED | OUTPATIENT
Start: 2021-01-01 | End: 2021-01-01

## 2021-01-01 RX ORDER — ENOXAPARIN SODIUM 100 MG/ML
70 INJECTION SUBCUTANEOUS EVERY 12 HOURS
Refills: 0 | Status: DISCONTINUED | OUTPATIENT
Start: 2021-01-01 | End: 2021-01-01

## 2021-01-01 RX ORDER — ACETAMINOPHEN 500 MG
650 TABLET ORAL EVERY 6 HOURS
Refills: 0 | Status: COMPLETED | OUTPATIENT
Start: 2021-01-01 | End: 2021-01-01

## 2021-01-01 RX ORDER — ENOXAPARIN SODIUM 100 MG/ML
40 INJECTION SUBCUTANEOUS DAILY
Refills: 0 | Status: DISCONTINUED | OUTPATIENT
Start: 2021-01-01 | End: 2021-01-01

## 2021-01-01 RX ORDER — SODIUM CHLORIDE 9 MG/ML
1000 INJECTION, SOLUTION INTRAVENOUS
Refills: 0 | Status: DISCONTINUED | OUTPATIENT
Start: 2021-01-01 | End: 2021-01-01

## 2021-01-01 RX ORDER — INSULIN GLARGINE 100 [IU]/ML
25 INJECTION, SOLUTION SUBCUTANEOUS AT BEDTIME
Refills: 0 | Status: DISCONTINUED | OUTPATIENT
Start: 2021-01-01 | End: 2021-01-01

## 2021-01-01 RX ORDER — INSULIN LISPRO 100/ML
6 VIAL (ML) SUBCUTANEOUS
Refills: 0 | Status: DISCONTINUED | OUTPATIENT
Start: 2021-01-01 | End: 2021-01-01

## 2021-01-01 RX ORDER — INSULIN LISPRO 100/ML
VIAL (ML) SUBCUTANEOUS AT BEDTIME
Refills: 0 | Status: DISCONTINUED | OUTPATIENT
Start: 2021-01-01 | End: 2021-01-01

## 2021-01-01 RX ORDER — DEXTROSE 50 % IN WATER 50 %
25 SYRINGE (ML) INTRAVENOUS ONCE
Refills: 0 | Status: COMPLETED | OUTPATIENT
Start: 2021-01-01 | End: 2021-01-01

## 2021-01-01 RX ORDER — MIDAZOLAM HYDROCHLORIDE 1 MG/ML
2 INJECTION, SOLUTION INTRAMUSCULAR; INTRAVENOUS ONCE
Refills: 0 | Status: DISCONTINUED | OUTPATIENT
Start: 2021-01-01 | End: 2021-01-01

## 2021-01-01 RX ORDER — FENTANYL CITRATE 50 UG/ML
0.5 INJECTION INTRAVENOUS
Qty: 2500 | Refills: 0 | Status: DISCONTINUED | OUTPATIENT
Start: 2021-01-01 | End: 2021-01-01

## 2021-01-01 RX ORDER — INSULIN LISPRO 100/ML
VIAL (ML) SUBCUTANEOUS EVERY 6 HOURS
Refills: 0 | Status: DISCONTINUED | OUTPATIENT
Start: 2021-01-01 | End: 2021-01-01

## 2021-01-01 RX ORDER — HYDROCHLOROTHIAZIDE 25 MG
12.5 TABLET ORAL DAILY
Refills: 0 | Status: DISCONTINUED | OUTPATIENT
Start: 2021-01-01 | End: 2021-01-01

## 2021-01-01 RX ORDER — VASOPRESSIN 20 [USP'U]/ML
0.04 INJECTION INTRAVENOUS
Qty: 50 | Refills: 0 | Status: DISCONTINUED | OUTPATIENT
Start: 2021-01-01 | End: 2021-01-01

## 2021-01-01 RX ORDER — HEPARIN SODIUM 5000 [USP'U]/ML
3000 INJECTION INTRAVENOUS; SUBCUTANEOUS EVERY 6 HOURS
Refills: 0 | Status: DISCONTINUED | OUTPATIENT
Start: 2021-01-01 | End: 2021-01-01

## 2021-01-01 RX ORDER — CISATRACURIUM BESYLATE 2 MG/ML
3 INJECTION INTRAVENOUS
Qty: 200 | Refills: 0 | Status: DISCONTINUED | OUTPATIENT
Start: 2021-01-01 | End: 2021-01-01

## 2021-01-01 RX ORDER — HEPARIN SODIUM 5000 [USP'U]/ML
6000 INJECTION INTRAVENOUS; SUBCUTANEOUS ONCE
Refills: 0 | Status: COMPLETED | OUTPATIENT
Start: 2021-01-01 | End: 2021-01-01

## 2021-01-01 RX ORDER — CHLORHEXIDINE GLUCONATE 213 G/1000ML
15 SOLUTION TOPICAL EVERY 12 HOURS
Refills: 0 | Status: DISCONTINUED | OUTPATIENT
Start: 2021-01-01 | End: 2021-01-01

## 2021-01-01 RX ORDER — SODIUM CHLORIDE 9 MG/ML
500 INJECTION, SOLUTION INTRAVENOUS ONCE
Refills: 0 | Status: COMPLETED | OUTPATIENT
Start: 2021-01-01 | End: 2021-01-01

## 2021-01-01 RX ORDER — ALTEPLASE 100 MG
50 KIT INTRAVENOUS ONCE
Refills: 0 | Status: COMPLETED | OUTPATIENT
Start: 2021-01-01 | End: 2021-01-01

## 2021-01-01 RX ORDER — CEFEPIME 1 G/1
2000 INJECTION, POWDER, FOR SOLUTION INTRAMUSCULAR; INTRAVENOUS EVERY 8 HOURS
Refills: 0 | Status: DISCONTINUED | OUTPATIENT
Start: 2021-01-01 | End: 2021-01-01

## 2021-01-01 RX ORDER — SODIUM CHLORIDE 9 MG/ML
1000 INJECTION INTRAMUSCULAR; INTRAVENOUS; SUBCUTANEOUS ONCE
Refills: 0 | Status: COMPLETED | OUTPATIENT
Start: 2021-01-01 | End: 2021-01-01

## 2021-01-01 RX ORDER — INSULIN GLARGINE 100 [IU]/ML
15 INJECTION, SOLUTION SUBCUTANEOUS AT BEDTIME
Refills: 0 | Status: DISCONTINUED | OUTPATIENT
Start: 2021-01-01 | End: 2021-01-01

## 2021-01-01 RX ORDER — CHLORHEXIDINE GLUCONATE 213 G/1000ML
1 SOLUTION TOPICAL
Refills: 0 | Status: DISCONTINUED | OUTPATIENT
Start: 2021-01-01 | End: 2021-01-01

## 2021-01-01 RX ORDER — REMDESIVIR 5 MG/ML
100 INJECTION INTRAVENOUS EVERY 24 HOURS
Refills: 0 | Status: COMPLETED | OUTPATIENT
Start: 2021-01-01 | End: 2021-01-01

## 2021-01-01 RX ORDER — INSULIN LISPRO 100/ML
4 VIAL (ML) SUBCUTANEOUS
Refills: 0 | Status: DISCONTINUED | OUTPATIENT
Start: 2021-01-01 | End: 2021-01-01

## 2021-01-01 RX ORDER — LIDOCAINE 4 G/100G
1 CREAM TOPICAL EVERY 24 HOURS
Refills: 0 | Status: DISCONTINUED | OUTPATIENT
Start: 2021-01-01 | End: 2021-01-01

## 2021-01-01 RX ORDER — MORPHINE SULFATE 50 MG/1
2 CAPSULE, EXTENDED RELEASE ORAL ONCE
Refills: 0 | Status: DISCONTINUED | OUTPATIENT
Start: 2021-01-01 | End: 2021-01-01

## 2021-01-01 RX ORDER — FUROSEMIDE 40 MG
40 TABLET ORAL ONCE
Refills: 0 | Status: COMPLETED | OUTPATIENT
Start: 2021-01-01 | End: 2021-01-01

## 2021-01-01 RX ORDER — NOREPINEPHRINE BITARTRATE/D5W 8 MG/250ML
0.02 PLASTIC BAG, INJECTION (ML) INTRAVENOUS
Qty: 8 | Refills: 0 | Status: DISCONTINUED | OUTPATIENT
Start: 2021-01-01 | End: 2021-01-01

## 2021-01-01 RX ORDER — CEFEPIME 1 G/1
2000 INJECTION, POWDER, FOR SOLUTION INTRAMUSCULAR; INTRAVENOUS DAILY
Refills: 0 | Status: COMPLETED | OUTPATIENT
Start: 2021-01-01 | End: 2021-01-01

## 2021-01-01 RX ORDER — MORPHINE SULFATE 50 MG/1
2 CAPSULE, EXTENDED RELEASE ORAL EVERY 4 HOURS
Refills: 0 | Status: DISCONTINUED | OUTPATIENT
Start: 2021-01-01 | End: 2021-01-01

## 2021-01-01 RX ORDER — VANCOMYCIN HCL 1 G
1000 VIAL (EA) INTRAVENOUS ONCE
Refills: 0 | Status: COMPLETED | OUTPATIENT
Start: 2021-01-01 | End: 2021-01-01

## 2021-01-01 RX ORDER — INSULIN LISPRO 100/ML
VIAL (ML) SUBCUTANEOUS
Refills: 0 | Status: DISCONTINUED | OUTPATIENT
Start: 2021-01-01 | End: 2021-01-01

## 2021-01-01 RX ORDER — ALBUMIN HUMAN 25 %
100 VIAL (ML) INTRAVENOUS ONCE
Refills: 0 | Status: COMPLETED | OUTPATIENT
Start: 2021-01-01 | End: 2021-01-01

## 2021-01-01 RX ORDER — DEXMEDETOMIDINE HYDROCHLORIDE IN 0.9% SODIUM CHLORIDE 4 UG/ML
0.5 INJECTION INTRAVENOUS
Qty: 200 | Refills: 0 | Status: DISCONTINUED | OUTPATIENT
Start: 2021-01-01 | End: 2021-01-01

## 2021-01-01 RX ORDER — HEPARIN SODIUM 5000 [USP'U]/ML
6000 INJECTION INTRAVENOUS; SUBCUTANEOUS EVERY 6 HOURS
Refills: 0 | Status: DISCONTINUED | OUTPATIENT
Start: 2021-01-01 | End: 2021-01-01

## 2021-01-01 RX ORDER — PANTOPRAZOLE SODIUM 20 MG/1
40 TABLET, DELAYED RELEASE ORAL EVERY 12 HOURS
Refills: 0 | Status: DISCONTINUED | OUTPATIENT
Start: 2021-01-01 | End: 2021-01-01

## 2021-01-01 RX ORDER — HEPARIN SODIUM 5000 [USP'U]/ML
900 INJECTION INTRAVENOUS; SUBCUTANEOUS
Qty: 25000 | Refills: 0 | Status: DISCONTINUED | OUTPATIENT
Start: 2021-01-01 | End: 2021-01-01

## 2021-01-01 RX ORDER — REMDESIVIR 5 MG/ML
200 INJECTION INTRAVENOUS EVERY 24 HOURS
Refills: 0 | Status: COMPLETED | OUTPATIENT
Start: 2021-01-01 | End: 2021-01-01

## 2021-01-01 RX ORDER — MIDODRINE HYDROCHLORIDE 2.5 MG/1
10 TABLET ORAL EVERY 8 HOURS
Refills: 0 | Status: DISCONTINUED | OUTPATIENT
Start: 2021-01-01 | End: 2021-01-01

## 2021-01-01 RX ORDER — GLUCAGON INJECTION, SOLUTION 0.5 MG/.1ML
1 INJECTION, SOLUTION SUBCUTANEOUS ONCE
Refills: 0 | Status: DISCONTINUED | OUTPATIENT
Start: 2021-01-01 | End: 2021-01-01

## 2021-01-01 RX ORDER — INSULIN GLARGINE 100 [IU]/ML
5 INJECTION, SOLUTION SUBCUTANEOUS AT BEDTIME
Refills: 0 | Status: DISCONTINUED | OUTPATIENT
Start: 2021-01-01 | End: 2021-01-01

## 2021-01-01 RX ORDER — INSULIN LISPRO 100/ML
2 VIAL (ML) SUBCUTANEOUS
Refills: 0 | Status: DISCONTINUED | OUTPATIENT
Start: 2021-01-01 | End: 2021-01-01

## 2021-01-01 RX ORDER — INSULIN GLARGINE 100 [IU]/ML
15 INJECTION, SOLUTION SUBCUTANEOUS EVERY MORNING
Refills: 0 | Status: DISCONTINUED | OUTPATIENT
Start: 2021-01-01 | End: 2021-01-01

## 2021-01-01 RX ORDER — SODIUM CHLORIDE 9 MG/ML
2300 INJECTION INTRAMUSCULAR; INTRAVENOUS; SUBCUTANEOUS ONCE
Refills: 0 | Status: DISCONTINUED | OUTPATIENT
Start: 2021-01-01 | End: 2021-01-01

## 2021-01-01 RX ORDER — INSULIN LISPRO 100/ML
10 VIAL (ML) SUBCUTANEOUS
Refills: 0 | Status: DISCONTINUED | OUTPATIENT
Start: 2021-01-01 | End: 2021-01-01

## 2021-01-01 RX ORDER — INSULIN LISPRO 100/ML
4 VIAL (ML) SUBCUTANEOUS EVERY 6 HOURS
Refills: 0 | Status: DISCONTINUED | OUTPATIENT
Start: 2021-01-01 | End: 2021-01-01

## 2021-01-01 RX ORDER — PROPOFOL 10 MG/ML
10 INJECTION, EMULSION INTRAVENOUS
Qty: 1000 | Refills: 0 | Status: DISCONTINUED | OUTPATIENT
Start: 2021-01-01 | End: 2021-01-01

## 2021-01-01 RX ORDER — FUROSEMIDE 40 MG
80 TABLET ORAL ONCE
Refills: 0 | Status: COMPLETED | OUTPATIENT
Start: 2021-01-01 | End: 2021-01-01

## 2021-01-01 RX ORDER — PANTOPRAZOLE SODIUM 20 MG/1
40 TABLET, DELAYED RELEASE ORAL DAILY
Refills: 0 | Status: DISCONTINUED | OUTPATIENT
Start: 2021-01-01 | End: 2021-01-01

## 2021-01-01 RX ORDER — INSULIN HUMAN 100 [IU]/ML
6 INJECTION, SOLUTION SUBCUTANEOUS
Qty: 100 | Refills: 0 | Status: DISCONTINUED | OUTPATIENT
Start: 2021-01-01 | End: 2021-01-01

## 2021-01-01 RX ORDER — HEPARIN SODIUM 5000 [USP'U]/ML
INJECTION INTRAVENOUS; SUBCUTANEOUS
Qty: 25000 | Refills: 0 | Status: DISCONTINUED | OUTPATIENT
Start: 2021-01-01 | End: 2021-01-01

## 2021-01-01 RX ORDER — MIDAZOLAM HYDROCHLORIDE 1 MG/ML
0.02 INJECTION, SOLUTION INTRAMUSCULAR; INTRAVENOUS
Qty: 100 | Refills: 0 | Status: DISCONTINUED | OUTPATIENT
Start: 2021-01-01 | End: 2021-01-01

## 2021-01-01 RX ORDER — INSULIN GLARGINE 100 [IU]/ML
27 INJECTION, SOLUTION SUBCUTANEOUS AT BEDTIME
Refills: 0 | Status: DISCONTINUED | OUTPATIENT
Start: 2021-01-01 | End: 2021-01-01

## 2021-01-01 RX ORDER — INSULIN LISPRO 100/ML
8 VIAL (ML) SUBCUTANEOUS
Refills: 0 | Status: DISCONTINUED | OUTPATIENT
Start: 2021-01-01 | End: 2021-01-01

## 2021-01-01 RX ORDER — DEXTROSE 50 % IN WATER 50 %
25 SYRINGE (ML) INTRAVENOUS ONCE
Refills: 0 | Status: DISCONTINUED | OUTPATIENT
Start: 2021-01-01 | End: 2021-01-01

## 2021-01-01 RX ORDER — SODIUM BICARBONATE 1 MEQ/ML
0.31 SYRINGE (ML) INTRAVENOUS
Qty: 150 | Refills: 0 | Status: DISCONTINUED | OUTPATIENT
Start: 2021-01-01 | End: 2021-01-01

## 2021-01-01 RX ORDER — MEROPENEM 1 G/30ML
500 INJECTION INTRAVENOUS EVERY 12 HOURS
Refills: 0 | Status: DISCONTINUED | OUTPATIENT
Start: 2021-01-01 | End: 2021-01-01

## 2021-01-01 RX ORDER — OXYCODONE HYDROCHLORIDE 5 MG/1
5 TABLET ORAL ONCE
Refills: 0 | Status: DISCONTINUED | OUTPATIENT
Start: 2021-01-01 | End: 2021-01-01

## 2021-01-01 RX ORDER — DEXAMETHASONE 0.5 MG/5ML
6 ELIXIR ORAL DAILY
Refills: 0 | Status: COMPLETED | OUTPATIENT
Start: 2021-01-01 | End: 2021-01-01

## 2021-01-01 RX ORDER — ACETAMINOPHEN 500 MG
650 TABLET ORAL EVERY 6 HOURS
Refills: 0 | Status: DISCONTINUED | OUTPATIENT
Start: 2021-01-01 | End: 2021-01-01

## 2021-01-01 RX ORDER — ROCURONIUM BROMIDE 10 MG/ML
100 VIAL (ML) INTRAVENOUS ONCE
Refills: 0 | Status: COMPLETED | OUTPATIENT
Start: 2021-01-01 | End: 2021-01-01

## 2021-01-01 RX ORDER — SODIUM CHLORIDE 9 MG/ML
1000 INJECTION, SOLUTION INTRAVENOUS ONCE
Refills: 0 | Status: COMPLETED | OUTPATIENT
Start: 2021-01-01 | End: 2021-01-01

## 2021-01-01 RX ORDER — ENOXAPARIN SODIUM 100 MG/ML
72 INJECTION SUBCUTANEOUS EVERY 12 HOURS
Refills: 0 | Status: DISCONTINUED | OUTPATIENT
Start: 2021-01-01 | End: 2021-01-01

## 2021-01-01 RX ORDER — PHENYLEPHRINE HYDROCHLORIDE 10 MG/ML
1 INJECTION INTRAVENOUS
Qty: 40 | Refills: 0 | Status: DISCONTINUED | OUTPATIENT
Start: 2021-01-01 | End: 2021-01-01

## 2021-01-01 RX ORDER — INSULIN LISPRO 100/ML
12 VIAL (ML) SUBCUTANEOUS
Refills: 0 | Status: DISCONTINUED | OUTPATIENT
Start: 2021-01-01 | End: 2021-01-01

## 2021-01-01 RX ORDER — CYCLOBENZAPRINE HYDROCHLORIDE 10 MG/1
5 TABLET, FILM COATED ORAL THREE TIMES A DAY
Refills: 0 | Status: DISCONTINUED | OUTPATIENT
Start: 2021-01-01 | End: 2021-01-01

## 2021-01-01 RX ORDER — SODIUM BICARBONATE 1 MEQ/ML
150 SYRINGE (ML) INTRAVENOUS ONCE
Refills: 0 | Status: COMPLETED | OUTPATIENT
Start: 2021-01-01 | End: 2021-01-01

## 2021-01-01 RX ORDER — INSULIN GLARGINE 100 [IU]/ML
10 INJECTION, SOLUTION SUBCUTANEOUS EVERY MORNING
Refills: 0 | Status: DISCONTINUED | OUTPATIENT
Start: 2021-01-01 | End: 2021-01-01

## 2021-01-01 RX ORDER — DEXTROSE 50 % IN WATER 50 %
15 SYRINGE (ML) INTRAVENOUS ONCE
Refills: 0 | Status: DISCONTINUED | OUTPATIENT
Start: 2021-01-01 | End: 2021-01-01

## 2021-01-01 RX ORDER — INSULIN LISPRO 100/ML
8 VIAL (ML) SUBCUTANEOUS ONCE
Refills: 0 | Status: COMPLETED | OUTPATIENT
Start: 2021-01-01 | End: 2021-01-01

## 2021-01-01 RX ORDER — DEXAMETHASONE 0.5 MG/5ML
6 ELIXIR ORAL DAILY
Refills: 0 | Status: DISCONTINUED | OUTPATIENT
Start: 2021-01-01 | End: 2021-01-01

## 2021-01-01 RX ORDER — NOREPINEPHRINE BITARTRATE/D5W 8 MG/250ML
0.05 PLASTIC BAG, INJECTION (ML) INTRAVENOUS
Qty: 8 | Refills: 0 | Status: DISCONTINUED | OUTPATIENT
Start: 2021-01-01 | End: 2021-01-01

## 2021-01-01 RX ORDER — ROCURONIUM BROMIDE 10 MG/ML
50 VIAL (ML) INTRAVENOUS ONCE
Refills: 0 | Status: COMPLETED | OUTPATIENT
Start: 2021-01-01 | End: 2021-01-01

## 2021-01-01 RX ORDER — SODIUM CHLORIDE 9 MG/ML
2300 INJECTION, SOLUTION INTRAVENOUS ONCE
Refills: 0 | Status: COMPLETED | OUTPATIENT
Start: 2021-01-01 | End: 2021-01-01

## 2021-01-01 RX ORDER — REMDESIVIR 5 MG/ML
INJECTION INTRAVENOUS
Refills: 0 | Status: COMPLETED | OUTPATIENT
Start: 2021-01-01 | End: 2021-01-01

## 2021-01-01 RX ORDER — CEFEPIME 1 G/1
2000 INJECTION, POWDER, FOR SOLUTION INTRAMUSCULAR; INTRAVENOUS ONCE
Refills: 0 | Status: COMPLETED | OUTPATIENT
Start: 2021-01-01 | End: 2021-01-01

## 2021-01-01 RX ORDER — HYDROMORPHONE HYDROCHLORIDE 2 MG/ML
2 INJECTION INTRAMUSCULAR; INTRAVENOUS; SUBCUTANEOUS ONCE
Refills: 0 | Status: DISCONTINUED | OUTPATIENT
Start: 2021-01-01 | End: 2021-01-01

## 2021-01-01 RX ORDER — DEXAMETHASONE 0.5 MG/5ML
6 ELIXIR ORAL ONCE
Refills: 0 | Status: COMPLETED | OUTPATIENT
Start: 2021-01-01 | End: 2021-01-01

## 2021-01-01 RX ORDER — NOREPINEPHRINE BITARTRATE/D5W 8 MG/250ML
0.05 PLASTIC BAG, INJECTION (ML) INTRAVENOUS
Qty: 16 | Refills: 0 | Status: DISCONTINUED | OUTPATIENT
Start: 2021-01-01 | End: 2021-01-01

## 2021-01-01 RX ORDER — KETOROLAC TROMETHAMINE 30 MG/ML
15 SYRINGE (ML) INJECTION ONCE
Refills: 0 | Status: DISCONTINUED | OUTPATIENT
Start: 2021-01-01 | End: 2021-01-01

## 2021-01-01 RX ORDER — MEROPENEM 1 G/30ML
500 INJECTION INTRAVENOUS EVERY 24 HOURS
Refills: 0 | Status: DISCONTINUED | OUTPATIENT
Start: 2021-01-01 | End: 2021-01-01

## 2021-01-01 RX ORDER — MIDODRINE HYDROCHLORIDE 2.5 MG/1
15 TABLET ORAL EVERY 8 HOURS
Refills: 0 | Status: DISCONTINUED | OUTPATIENT
Start: 2021-01-01 | End: 2021-01-01

## 2021-01-01 RX ORDER — LIDOCAINE 4 G/100G
1 CREAM TOPICAL ONCE
Refills: 0 | Status: COMPLETED | OUTPATIENT
Start: 2021-01-01 | End: 2021-01-01

## 2021-01-01 RX ORDER — INSULIN GLARGINE 100 [IU]/ML
10 INJECTION, SOLUTION SUBCUTANEOUS AT BEDTIME
Refills: 0 | Status: DISCONTINUED | OUTPATIENT
Start: 2021-01-01 | End: 2021-01-01

## 2021-01-01 RX ORDER — ACETAMINOPHEN 500 MG
650 TABLET ORAL ONCE
Refills: 0 | Status: COMPLETED | OUTPATIENT
Start: 2021-01-01 | End: 2021-01-01

## 2021-01-01 RX ORDER — FERROUS SULFATE 325(65) MG
325 TABLET ORAL DAILY
Refills: 0 | Status: DISCONTINUED | OUTPATIENT
Start: 2021-01-01 | End: 2021-01-01

## 2021-01-01 RX ORDER — ACETYLCYSTEINE 200 MG/ML
4 VIAL (ML) MISCELLANEOUS THREE TIMES A DAY
Refills: 0 | Status: DISCONTINUED | OUTPATIENT
Start: 2021-01-01 | End: 2021-01-01

## 2021-01-01 RX ORDER — LOSARTAN POTASSIUM 100 MG/1
50 TABLET, FILM COATED ORAL DAILY
Refills: 0 | Status: DISCONTINUED | OUTPATIENT
Start: 2021-01-01 | End: 2021-01-01

## 2021-01-01 RX ORDER — MORPHINE SULFATE 50 MG/1
1 CAPSULE, EXTENDED RELEASE ORAL EVERY 6 HOURS
Refills: 0 | Status: DISCONTINUED | OUTPATIENT
Start: 2021-01-01 | End: 2021-01-01

## 2021-01-01 RX ORDER — ATORVASTATIN CALCIUM 80 MG/1
10 TABLET, FILM COATED ORAL AT BEDTIME
Refills: 0 | Status: DISCONTINUED | OUTPATIENT
Start: 2021-01-01 | End: 2021-01-01

## 2021-01-01 RX ORDER — IRON SUCROSE 20 MG/ML
200 INJECTION, SOLUTION INTRAVENOUS EVERY 24 HOURS
Refills: 0 | Status: DISCONTINUED | OUTPATIENT
Start: 2021-01-01 | End: 2021-01-01

## 2021-01-01 RX ORDER — PANTOPRAZOLE SODIUM 20 MG/1
40 TABLET, DELAYED RELEASE ORAL
Refills: 0 | Status: DISCONTINUED | OUTPATIENT
Start: 2021-01-01 | End: 2021-01-01

## 2021-01-01 RX ADMIN — CYCLOBENZAPRINE HYDROCHLORIDE 5 MILLIGRAM(S): 10 TABLET, FILM COATED ORAL at 23:17

## 2021-01-01 RX ADMIN — Medication 4: at 13:28

## 2021-01-01 RX ADMIN — LIDOCAINE 1 PATCH: 4 CREAM TOPICAL at 14:22

## 2021-01-01 RX ADMIN — PROPOFOL 4.36 MICROGRAM(S)/KG/MIN: 10 INJECTION, EMULSION INTRAVENOUS at 02:10

## 2021-01-01 RX ADMIN — ENOXAPARIN SODIUM 40 MILLIGRAM(S): 100 INJECTION SUBCUTANEOUS at 12:30

## 2021-01-01 RX ADMIN — PANTOPRAZOLE SODIUM 40 MILLIGRAM(S): 20 TABLET, DELAYED RELEASE ORAL at 08:32

## 2021-01-01 RX ADMIN — Medication 40 MILLIGRAM(S): at 01:29

## 2021-01-01 RX ADMIN — ENOXAPARIN SODIUM 70 MILLIGRAM(S): 100 INJECTION SUBCUTANEOUS at 05:14

## 2021-01-01 RX ADMIN — ATORVASTATIN CALCIUM 10 MILLIGRAM(S): 80 TABLET, FILM COATED ORAL at 21:01

## 2021-01-01 RX ADMIN — MIDODRINE HYDROCHLORIDE 15 MILLIGRAM(S): 2.5 TABLET ORAL at 23:45

## 2021-01-01 RX ADMIN — Medication 6: at 06:17

## 2021-01-01 RX ADMIN — PANTOPRAZOLE SODIUM 40 MILLIGRAM(S): 20 TABLET, DELAYED RELEASE ORAL at 06:06

## 2021-01-01 RX ADMIN — Medication 650 MILLIGRAM(S): at 10:02

## 2021-01-01 RX ADMIN — MIDODRINE HYDROCHLORIDE 15 MILLIGRAM(S): 2.5 TABLET ORAL at 22:12

## 2021-01-01 RX ADMIN — PROPOFOL 4.36 MICROGRAM(S)/KG/MIN: 10 INJECTION, EMULSION INTRAVENOUS at 00:00

## 2021-01-01 RX ADMIN — PROPOFOL 4.36 MICROGRAM(S)/KG/MIN: 10 INJECTION, EMULSION INTRAVENOUS at 09:01

## 2021-01-01 RX ADMIN — PANTOPRAZOLE SODIUM 40 MILLIGRAM(S): 20 TABLET, DELAYED RELEASE ORAL at 17:07

## 2021-01-01 RX ADMIN — LOSARTAN POTASSIUM 50 MILLIGRAM(S): 100 TABLET, FILM COATED ORAL at 04:52

## 2021-01-01 RX ADMIN — Medication 2: at 05:51

## 2021-01-01 RX ADMIN — HEPARIN SODIUM 0 UNIT(S)/HR: 5000 INJECTION INTRAVENOUS; SUBCUTANEOUS at 20:24

## 2021-01-01 RX ADMIN — PANTOPRAZOLE SODIUM 40 MILLIGRAM(S): 20 TABLET, DELAYED RELEASE ORAL at 04:52

## 2021-01-01 RX ADMIN — CEFEPIME 100 MILLIGRAM(S): 1 INJECTION, POWDER, FOR SOLUTION INTRAMUSCULAR; INTRAVENOUS at 12:50

## 2021-01-01 RX ADMIN — Medication 6: at 23:47

## 2021-01-01 RX ADMIN — Medication 6 MILLIGRAM(S): at 04:52

## 2021-01-01 RX ADMIN — PROPOFOL 4.36 MICROGRAM(S)/KG/MIN: 10 INJECTION, EMULSION INTRAVENOUS at 22:28

## 2021-01-01 RX ADMIN — CEFEPIME 100 MILLIGRAM(S): 1 INJECTION, POWDER, FOR SOLUTION INTRAMUSCULAR; INTRAVENOUS at 04:47

## 2021-01-01 RX ADMIN — ALTEPLASE 25 MILLIGRAM(S): KIT at 19:30

## 2021-01-01 RX ADMIN — PROPOFOL 4.36 MICROGRAM(S)/KG/MIN: 10 INJECTION, EMULSION INTRAVENOUS at 02:26

## 2021-01-01 RX ADMIN — CEFEPIME 100 MILLIGRAM(S): 1 INJECTION, POWDER, FOR SOLUTION INTRAMUSCULAR; INTRAVENOUS at 12:57

## 2021-01-01 RX ADMIN — CEFEPIME 100 MILLIGRAM(S): 1 INJECTION, POWDER, FOR SOLUTION INTRAMUSCULAR; INTRAVENOUS at 05:26

## 2021-01-01 RX ADMIN — Medication 650 MILLIGRAM(S): at 16:28

## 2021-01-01 RX ADMIN — Medication 40 MILLIGRAM(S): at 05:39

## 2021-01-01 RX ADMIN — SODIUM CHLORIDE 1000 MILLILITER(S): 9 INJECTION INTRAMUSCULAR; INTRAVENOUS; SUBCUTANEOUS at 11:15

## 2021-01-01 RX ADMIN — MIDAZOLAM HYDROCHLORIDE 1.44 MG/KG/HR: 1 INJECTION, SOLUTION INTRAMUSCULAR; INTRAVENOUS at 08:53

## 2021-01-01 RX ADMIN — Medication 6: at 12:31

## 2021-01-01 RX ADMIN — INSULIN GLARGINE 25 UNIT(S): 100 INJECTION, SOLUTION SUBCUTANEOUS at 20:58

## 2021-01-01 RX ADMIN — CHLORHEXIDINE GLUCONATE 15 MILLILITER(S): 213 SOLUTION TOPICAL at 04:47

## 2021-01-01 RX ADMIN — Medication 100 MILLIGRAM(S): at 21:50

## 2021-01-01 RX ADMIN — MORPHINE SULFATE 1 MILLIGRAM(S): 50 CAPSULE, EXTENDED RELEASE ORAL at 19:07

## 2021-01-01 RX ADMIN — CHLORHEXIDINE GLUCONATE 15 MILLILITER(S): 213 SOLUTION TOPICAL at 17:06

## 2021-01-01 RX ADMIN — VASOPRESSIN 2.4 UNIT(S)/MIN: 20 INJECTION INTRAVENOUS at 08:24

## 2021-01-01 RX ADMIN — Medication 6 MILLIGRAM(S): at 16:32

## 2021-01-01 RX ADMIN — Medication 50 MILLILITER(S): at 12:45

## 2021-01-01 RX ADMIN — CHLORHEXIDINE GLUCONATE 1 APPLICATION(S): 213 SOLUTION TOPICAL at 05:23

## 2021-01-01 RX ADMIN — CHLORHEXIDINE GLUCONATE 15 MILLILITER(S): 213 SOLUTION TOPICAL at 05:21

## 2021-01-01 RX ADMIN — PANTOPRAZOLE SODIUM 40 MILLIGRAM(S): 20 TABLET, DELAYED RELEASE ORAL at 06:03

## 2021-01-01 RX ADMIN — PROPOFOL 4.36 MICROGRAM(S)/KG/MIN: 10 INJECTION, EMULSION INTRAVENOUS at 15:03

## 2021-01-01 RX ADMIN — Medication 2.72 MICROGRAM(S)/KG/MIN: at 00:30

## 2021-01-01 RX ADMIN — INSULIN GLARGINE 25 UNIT(S): 100 INJECTION, SOLUTION SUBCUTANEOUS at 20:40

## 2021-01-01 RX ADMIN — Medication 2: at 07:04

## 2021-01-01 RX ADMIN — REMDESIVIR 500 MILLIGRAM(S): 5 INJECTION INTRAVENOUS at 17:38

## 2021-01-01 RX ADMIN — Medication 2.72 MICROGRAM(S)/KG/MIN: at 10:32

## 2021-01-01 RX ADMIN — CHLORHEXIDINE GLUCONATE 15 MILLILITER(S): 213 SOLUTION TOPICAL at 06:17

## 2021-01-01 RX ADMIN — MORPHINE SULFATE 1 MILLIGRAM(S): 50 CAPSULE, EXTENDED RELEASE ORAL at 20:37

## 2021-01-01 RX ADMIN — MORPHINE SULFATE 2 MILLIGRAM(S): 50 CAPSULE, EXTENDED RELEASE ORAL at 09:59

## 2021-01-01 RX ADMIN — IRON SUCROSE 110 MILLIGRAM(S): 20 INJECTION, SOLUTION INTRAVENOUS at 21:42

## 2021-01-01 RX ADMIN — Medication 2.72 MICROGRAM(S)/KG/MIN: at 09:01

## 2021-01-01 RX ADMIN — SODIUM CHLORIDE 2300 MILLILITER(S): 9 INJECTION, SOLUTION INTRAVENOUS at 10:32

## 2021-01-01 RX ADMIN — ENOXAPARIN SODIUM 40 MILLIGRAM(S): 100 INJECTION SUBCUTANEOUS at 11:20

## 2021-01-01 RX ADMIN — PANTOPRAZOLE SODIUM 40 MILLIGRAM(S): 20 TABLET, DELAYED RELEASE ORAL at 05:30

## 2021-01-01 RX ADMIN — Medication 2: at 15:57

## 2021-01-01 RX ADMIN — Medication 650 MILLIGRAM(S): at 06:06

## 2021-01-01 RX ADMIN — Medication 25 GRAM(S): at 15:45

## 2021-01-01 RX ADMIN — FENTANYL CITRATE 3.63 MICROGRAM(S)/KG/HR: 50 INJECTION INTRAVENOUS at 22:34

## 2021-01-01 RX ADMIN — Medication 15 MILLIGRAM(S): at 16:21

## 2021-01-01 RX ADMIN — Medication 650 MILLIGRAM(S): at 12:31

## 2021-01-01 RX ADMIN — MORPHINE SULFATE 2 MILLIGRAM(S): 50 CAPSULE, EXTENDED RELEASE ORAL at 06:12

## 2021-01-01 RX ADMIN — Medication 2: at 10:10

## 2021-01-01 RX ADMIN — CHLORHEXIDINE GLUCONATE 15 MILLILITER(S): 213 SOLUTION TOPICAL at 05:23

## 2021-01-01 RX ADMIN — Medication 325 MILLIGRAM(S): at 11:20

## 2021-01-01 RX ADMIN — Medication 12 UNIT(S): at 13:07

## 2021-01-01 RX ADMIN — INSULIN HUMAN 6 UNIT(S)/HR: 100 INJECTION, SOLUTION SUBCUTANEOUS at 18:04

## 2021-01-01 RX ADMIN — Medication 6: at 08:30

## 2021-01-01 RX ADMIN — Medication 650 MILLIGRAM(S): at 05:27

## 2021-01-01 RX ADMIN — CISATRACURIUM BESYLATE 13.1 MICROGRAM(S)/KG/MIN: 2 INJECTION INTRAVENOUS at 12:40

## 2021-01-01 RX ADMIN — Medication 6.81 MICROGRAM(S)/KG/MIN: at 06:10

## 2021-01-01 RX ADMIN — Medication 12.5 MILLIGRAM(S): at 06:00

## 2021-01-01 RX ADMIN — Medication 2: at 18:24

## 2021-01-01 RX ADMIN — LOSARTAN POTASSIUM 50 MILLIGRAM(S): 100 TABLET, FILM COATED ORAL at 06:06

## 2021-01-01 RX ADMIN — Medication 40 MILLIGRAM(S): at 12:03

## 2021-01-01 RX ADMIN — ENOXAPARIN SODIUM 40 MILLIGRAM(S): 100 INJECTION SUBCUTANEOUS at 12:18

## 2021-01-01 RX ADMIN — MIDAZOLAM HYDROCHLORIDE 2 MILLIGRAM(S): 1 INJECTION, SOLUTION INTRAMUSCULAR; INTRAVENOUS at 22:30

## 2021-01-01 RX ADMIN — ENOXAPARIN SODIUM 70 MILLIGRAM(S): 100 INJECTION SUBCUTANEOUS at 18:24

## 2021-01-01 RX ADMIN — ENOXAPARIN SODIUM 70 MILLIGRAM(S): 100 INJECTION SUBCUTANEOUS at 05:17

## 2021-01-01 RX ADMIN — CHLORHEXIDINE GLUCONATE 15 MILLILITER(S): 213 SOLUTION TOPICAL at 17:29

## 2021-01-01 RX ADMIN — MORPHINE SULFATE 1 MILLIGRAM(S): 50 CAPSULE, EXTENDED RELEASE ORAL at 09:17

## 2021-01-01 RX ADMIN — FAMOTIDINE 20 MILLIGRAM(S): 10 INJECTION INTRAVENOUS at 10:09

## 2021-01-01 RX ADMIN — Medication 40 MILLIGRAM(S): at 17:40

## 2021-01-01 RX ADMIN — PROPOFOL 4.36 MICROGRAM(S)/KG/MIN: 10 INJECTION, EMULSION INTRAVENOUS at 20:10

## 2021-01-01 RX ADMIN — Medication 650 MILLIGRAM(S): at 06:54

## 2021-01-01 RX ADMIN — MEROPENEM 100 MILLIGRAM(S): 1 INJECTION INTRAVENOUS at 05:24

## 2021-01-01 RX ADMIN — FENTANYL CITRATE 3.63 MICROGRAM(S)/KG/HR: 50 INJECTION INTRAVENOUS at 06:01

## 2021-01-01 RX ADMIN — Medication 12.5 MILLIGRAM(S): at 04:33

## 2021-01-01 RX ADMIN — ENOXAPARIN SODIUM 40 MILLIGRAM(S): 100 INJECTION SUBCUTANEOUS at 11:31

## 2021-01-01 RX ADMIN — PROPOFOL 4.36 MICROGRAM(S)/KG/MIN: 10 INJECTION, EMULSION INTRAVENOUS at 18:11

## 2021-01-01 RX ADMIN — Medication 8 UNIT(S): at 12:19

## 2021-01-01 RX ADMIN — PROPOFOL 4.36 MICROGRAM(S)/KG/MIN: 10 INJECTION, EMULSION INTRAVENOUS at 18:39

## 2021-01-01 RX ADMIN — MEROPENEM 100 MILLIGRAM(S): 1 INJECTION INTRAVENOUS at 17:38

## 2021-01-01 RX ADMIN — INSULIN HUMAN 6 UNIT(S)/HR: 100 INJECTION, SOLUTION SUBCUTANEOUS at 03:54

## 2021-01-01 RX ADMIN — Medication 150 MILLIEQUIVALENT(S): at 08:20

## 2021-01-01 RX ADMIN — CISATRACURIUM BESYLATE 13.1 MICROGRAM(S)/KG/MIN: 2 INJECTION INTRAVENOUS at 08:23

## 2021-01-01 RX ADMIN — HEPARIN SODIUM 900 UNIT(S)/HR: 5000 INJECTION INTRAVENOUS; SUBCUTANEOUS at 04:17

## 2021-01-01 RX ADMIN — REMDESIVIR 500 MILLIGRAM(S): 5 INJECTION INTRAVENOUS at 18:27

## 2021-01-01 RX ADMIN — Medication 4: at 12:06

## 2021-01-01 RX ADMIN — MORPHINE SULFATE 2 MILLIGRAM(S): 50 CAPSULE, EXTENDED RELEASE ORAL at 13:39

## 2021-01-01 RX ADMIN — FENTANYL CITRATE 3.63 MICROGRAM(S)/KG/HR: 50 INJECTION INTRAVENOUS at 17:59

## 2021-01-01 RX ADMIN — ENOXAPARIN SODIUM 70 MILLIGRAM(S): 100 INJECTION SUBCUTANEOUS at 13:19

## 2021-01-01 RX ADMIN — Medication 6 MILLIGRAM(S): at 06:08

## 2021-01-01 RX ADMIN — FENTANYL CITRATE 3.63 MICROGRAM(S)/KG/HR: 50 INJECTION INTRAVENOUS at 12:09

## 2021-01-01 RX ADMIN — Medication 1 MILLIGRAM(S): at 03:23

## 2021-01-01 RX ADMIN — Medication 2.72 MICROGRAM(S)/KG/MIN: at 08:24

## 2021-01-01 RX ADMIN — Medication 650 MILLIGRAM(S): at 12:19

## 2021-01-01 RX ADMIN — CEFEPIME 100 MILLIGRAM(S): 1 INJECTION, POWDER, FOR SOLUTION INTRAMUSCULAR; INTRAVENOUS at 14:59

## 2021-01-01 RX ADMIN — MIDODRINE HYDROCHLORIDE 15 MILLIGRAM(S): 2.5 TABLET ORAL at 06:23

## 2021-01-01 RX ADMIN — CEFEPIME 100 MILLIGRAM(S): 1 INJECTION, POWDER, FOR SOLUTION INTRAMUSCULAR; INTRAVENOUS at 21:10

## 2021-01-01 RX ADMIN — MEROPENEM 100 MILLIGRAM(S): 1 INJECTION INTRAVENOUS at 17:41

## 2021-01-01 RX ADMIN — Medication 2: at 17:05

## 2021-01-01 RX ADMIN — Medication 30 MILLILITER(S): at 17:32

## 2021-01-01 RX ADMIN — CEFEPIME 100 MILLIGRAM(S): 1 INJECTION, POWDER, FOR SOLUTION INTRAMUSCULAR; INTRAVENOUS at 14:17

## 2021-01-01 RX ADMIN — MORPHINE SULFATE 1 MILLIGRAM(S): 50 CAPSULE, EXTENDED RELEASE ORAL at 20:24

## 2021-01-01 RX ADMIN — LIDOCAINE 1 PATCH: 4 CREAM TOPICAL at 21:16

## 2021-01-01 RX ADMIN — Medication 325 MILLIGRAM(S): at 12:18

## 2021-01-01 RX ADMIN — CHLORHEXIDINE GLUCONATE 1 APPLICATION(S): 213 SOLUTION TOPICAL at 04:59

## 2021-01-01 RX ADMIN — Medication 8 UNIT(S): at 21:25

## 2021-01-01 RX ADMIN — FENTANYL CITRATE 3.63 MICROGRAM(S)/KG/HR: 50 INJECTION INTRAVENOUS at 07:38

## 2021-01-01 RX ADMIN — FENTANYL CITRATE 3.63 MICROGRAM(S)/KG/HR: 50 INJECTION INTRAVENOUS at 13:05

## 2021-01-01 RX ADMIN — Medication 650 MILLIGRAM(S): at 10:55

## 2021-01-01 RX ADMIN — MORPHINE SULFATE 1 MILLIGRAM(S): 50 CAPSULE, EXTENDED RELEASE ORAL at 02:25

## 2021-01-01 RX ADMIN — Medication 8 UNIT(S): at 17:37

## 2021-01-01 RX ADMIN — CEFEPIME 100 MILLIGRAM(S): 1 INJECTION, POWDER, FOR SOLUTION INTRAMUSCULAR; INTRAVENOUS at 15:56

## 2021-01-01 RX ADMIN — MORPHINE SULFATE 2 MILLIGRAM(S): 50 CAPSULE, EXTENDED RELEASE ORAL at 01:00

## 2021-01-01 RX ADMIN — Medication 2.72 MICROGRAM(S)/KG/MIN: at 18:40

## 2021-01-01 RX ADMIN — CISATRACURIUM BESYLATE 13.1 MICROGRAM(S)/KG/MIN: 2 INJECTION INTRAVENOUS at 20:07

## 2021-01-01 RX ADMIN — CEFEPIME 100 MILLIGRAM(S): 1 INJECTION, POWDER, FOR SOLUTION INTRAMUSCULAR; INTRAVENOUS at 16:02

## 2021-01-01 RX ADMIN — ATORVASTATIN CALCIUM 10 MILLIGRAM(S): 80 TABLET, FILM COATED ORAL at 21:27

## 2021-01-01 RX ADMIN — MEROPENEM 100 MILLIGRAM(S): 1 INJECTION INTRAVENOUS at 17:01

## 2021-01-01 RX ADMIN — Medication 6 MILLIGRAM(S): at 06:00

## 2021-01-01 RX ADMIN — Medication 6 MILLIGRAM(S): at 04:34

## 2021-01-01 RX ADMIN — HEPARIN SODIUM 1300 UNIT(S)/HR: 5000 INJECTION INTRAVENOUS; SUBCUTANEOUS at 03:31

## 2021-01-01 RX ADMIN — Medication 2: at 09:06

## 2021-01-01 RX ADMIN — INSULIN HUMAN 6 UNIT(S)/HR: 100 INJECTION, SOLUTION SUBCUTANEOUS at 20:28

## 2021-01-01 RX ADMIN — Medication 2.72 MICROGRAM(S)/KG/MIN: at 08:39

## 2021-01-01 RX ADMIN — CEFEPIME 100 MILLIGRAM(S): 1 INJECTION, POWDER, FOR SOLUTION INTRAMUSCULAR; INTRAVENOUS at 21:15

## 2021-01-01 RX ADMIN — MIDODRINE HYDROCHLORIDE 15 MILLIGRAM(S): 2.5 TABLET ORAL at 12:47

## 2021-01-01 RX ADMIN — Medication 40 MILLIGRAM(S): at 14:39

## 2021-01-01 RX ADMIN — Medication 4 UNIT(S): at 23:48

## 2021-01-01 RX ADMIN — Medication 40 MILLIGRAM(S): at 11:10

## 2021-01-01 RX ADMIN — CHLORHEXIDINE GLUCONATE 15 MILLILITER(S): 213 SOLUTION TOPICAL at 05:30

## 2021-01-01 RX ADMIN — Medication 650 MILLIGRAM(S): at 23:33

## 2021-01-01 RX ADMIN — Medication 4: at 18:02

## 2021-01-01 RX ADMIN — Medication 150 MEQ/KG/HR: at 10:33

## 2021-01-01 RX ADMIN — Medication 2: at 17:45

## 2021-01-01 RX ADMIN — Medication 4: at 12:24

## 2021-01-01 RX ADMIN — MORPHINE SULFATE 1 MILLIGRAM(S): 50 CAPSULE, EXTENDED RELEASE ORAL at 18:01

## 2021-01-01 RX ADMIN — Medication 80 MILLIGRAM(S): at 17:06

## 2021-01-01 RX ADMIN — Medication 4: at 10:16

## 2021-01-01 RX ADMIN — Medication 40 MILLIGRAM(S): at 23:59

## 2021-01-01 RX ADMIN — Medication 12.5 MILLIGRAM(S): at 04:45

## 2021-01-01 RX ADMIN — Medication 325 MILLIGRAM(S): at 12:52

## 2021-01-01 RX ADMIN — INSULIN GLARGINE 15 UNIT(S): 100 INJECTION, SOLUTION SUBCUTANEOUS at 09:01

## 2021-01-01 RX ADMIN — INSULIN GLARGINE 25 UNIT(S): 100 INJECTION, SOLUTION SUBCUTANEOUS at 21:42

## 2021-01-01 RX ADMIN — SODIUM CHLORIDE 4000 MILLILITER(S): 9 INJECTION, SOLUTION INTRAVENOUS at 16:02

## 2021-01-01 RX ADMIN — ENOXAPARIN SODIUM 70 MILLIGRAM(S): 100 INJECTION SUBCUTANEOUS at 05:26

## 2021-01-01 RX ADMIN — CHLORHEXIDINE GLUCONATE 1 APPLICATION(S): 213 SOLUTION TOPICAL at 04:47

## 2021-01-01 RX ADMIN — PANTOPRAZOLE SODIUM 40 MILLIGRAM(S): 20 TABLET, DELAYED RELEASE ORAL at 05:31

## 2021-01-01 RX ADMIN — CEFEPIME 100 MILLIGRAM(S): 1 INJECTION, POWDER, FOR SOLUTION INTRAMUSCULAR; INTRAVENOUS at 22:01

## 2021-01-01 RX ADMIN — Medication 12.5 MILLIGRAM(S): at 04:52

## 2021-01-01 RX ADMIN — PROPOFOL 4.36 MICROGRAM(S)/KG/MIN: 10 INJECTION, EMULSION INTRAVENOUS at 06:01

## 2021-01-01 RX ADMIN — PANTOPRAZOLE SODIUM 40 MILLIGRAM(S): 20 TABLET, DELAYED RELEASE ORAL at 06:18

## 2021-01-01 RX ADMIN — Medication 250 MILLIGRAM(S): at 12:51

## 2021-01-01 RX ADMIN — OXYCODONE HYDROCHLORIDE 5 MILLIGRAM(S): 5 TABLET ORAL at 23:41

## 2021-01-01 RX ADMIN — Medication 2.72 MICROGRAM(S)/KG/MIN: at 20:10

## 2021-01-01 RX ADMIN — MORPHINE SULFATE 1 MILLIGRAM(S): 50 CAPSULE, EXTENDED RELEASE ORAL at 03:17

## 2021-01-01 RX ADMIN — CISATRACURIUM BESYLATE 13.1 MICROGRAM(S)/KG/MIN: 2 INJECTION INTRAVENOUS at 14:48

## 2021-01-01 RX ADMIN — FAMOTIDINE 20 MILLIGRAM(S): 10 INJECTION INTRAVENOUS at 12:03

## 2021-01-01 RX ADMIN — PANTOPRAZOLE SODIUM 40 MILLIGRAM(S): 20 TABLET, DELAYED RELEASE ORAL at 05:14

## 2021-01-01 RX ADMIN — PROPOFOL 4.36 MICROGRAM(S)/KG/MIN: 10 INJECTION, EMULSION INTRAVENOUS at 23:27

## 2021-01-01 RX ADMIN — FENTANYL CITRATE 3.63 MICROGRAM(S)/KG/HR: 50 INJECTION INTRAVENOUS at 09:38

## 2021-01-01 RX ADMIN — Medication 4: at 08:29

## 2021-01-01 RX ADMIN — Medication 40 MILLIGRAM(S): at 01:05

## 2021-01-01 RX ADMIN — Medication 325 MILLIGRAM(S): at 16:29

## 2021-01-01 RX ADMIN — Medication 2.72 MICROGRAM(S)/KG/MIN: at 11:55

## 2021-01-01 RX ADMIN — HEPARIN SODIUM 1100 UNIT(S)/HR: 5000 INJECTION INTRAVENOUS; SUBCUTANEOUS at 11:30

## 2021-01-01 RX ADMIN — Medication 100 MILLIGRAM(S): at 04:52

## 2021-01-01 RX ADMIN — ATORVASTATIN CALCIUM 10 MILLIGRAM(S): 80 TABLET, FILM COATED ORAL at 20:24

## 2021-01-01 RX ADMIN — INSULIN GLARGINE 15 UNIT(S): 100 INJECTION, SOLUTION SUBCUTANEOUS at 21:02

## 2021-01-01 RX ADMIN — PROPOFOL 4.36 MICROGRAM(S)/KG/MIN: 10 INJECTION, EMULSION INTRAVENOUS at 05:20

## 2021-01-01 RX ADMIN — PROPOFOL 4.36 MICROGRAM(S)/KG/MIN: 10 INJECTION, EMULSION INTRAVENOUS at 11:01

## 2021-01-01 RX ADMIN — Medication 325 MILLIGRAM(S): at 10:02

## 2021-01-01 RX ADMIN — MIDODRINE HYDROCHLORIDE 10 MILLIGRAM(S): 2.5 TABLET ORAL at 13:03

## 2021-01-01 RX ADMIN — CISATRACURIUM BESYLATE 13.1 MICROGRAM(S)/KG/MIN: 2 INJECTION INTRAVENOUS at 22:46

## 2021-01-01 RX ADMIN — ATORVASTATIN CALCIUM 10 MILLIGRAM(S): 80 TABLET, FILM COATED ORAL at 21:10

## 2021-01-01 RX ADMIN — CEFEPIME 100 MILLIGRAM(S): 1 INJECTION, POWDER, FOR SOLUTION INTRAMUSCULAR; INTRAVENOUS at 05:31

## 2021-01-01 RX ADMIN — ENOXAPARIN SODIUM 70 MILLIGRAM(S): 100 INJECTION SUBCUTANEOUS at 16:36

## 2021-01-01 RX ADMIN — MIDODRINE HYDROCHLORIDE 15 MILLIGRAM(S): 2.5 TABLET ORAL at 05:30

## 2021-01-01 RX ADMIN — Medication 4: at 17:30

## 2021-01-01 RX ADMIN — ATORVASTATIN CALCIUM 10 MILLIGRAM(S): 80 TABLET, FILM COATED ORAL at 21:14

## 2021-01-01 RX ADMIN — Medication 2.72 MICROGRAM(S)/KG/MIN: at 14:02

## 2021-01-01 RX ADMIN — Medication 4 UNIT(S): at 06:17

## 2021-01-01 RX ADMIN — Medication 40 MILLIGRAM(S): at 05:24

## 2021-01-01 RX ADMIN — Medication 40 MILLIGRAM(S): at 10:08

## 2021-01-01 RX ADMIN — CISATRACURIUM BESYLATE 13.1 MICROGRAM(S)/KG/MIN: 2 INJECTION INTRAVENOUS at 05:37

## 2021-01-01 RX ADMIN — Medication 6: at 11:42

## 2021-01-01 RX ADMIN — LIDOCAINE 1 PATCH: 4 CREAM TOPICAL at 13:28

## 2021-01-01 RX ADMIN — CHLORHEXIDINE GLUCONATE 15 MILLILITER(S): 213 SOLUTION TOPICAL at 06:10

## 2021-01-01 RX ADMIN — INSULIN GLARGINE 25 UNIT(S): 100 INJECTION, SOLUTION SUBCUTANEOUS at 21:15

## 2021-01-01 RX ADMIN — MORPHINE SULFATE 2 MILLIGRAM(S): 50 CAPSULE, EXTENDED RELEASE ORAL at 15:29

## 2021-01-01 RX ADMIN — Medication 40 MILLIGRAM(S): at 17:38

## 2021-01-01 RX ADMIN — ATORVASTATIN CALCIUM 10 MILLIGRAM(S): 80 TABLET, FILM COATED ORAL at 22:34

## 2021-01-01 RX ADMIN — Medication 40 MILLIGRAM(S): at 17:07

## 2021-01-01 RX ADMIN — Medication 250 MILLIGRAM(S): at 03:33

## 2021-01-01 RX ADMIN — Medication 2.72 MICROGRAM(S)/KG/MIN: at 18:04

## 2021-01-01 RX ADMIN — Medication 6 MILLIGRAM(S): at 04:22

## 2021-01-01 RX ADMIN — PROPOFOL 4.36 MICROGRAM(S)/KG/MIN: 10 INJECTION, EMULSION INTRAVENOUS at 12:09

## 2021-01-01 RX ADMIN — ATORVASTATIN CALCIUM 10 MILLIGRAM(S): 80 TABLET, FILM COATED ORAL at 19:54

## 2021-01-01 RX ADMIN — PROPOFOL 4.36 MICROGRAM(S)/KG/MIN: 10 INJECTION, EMULSION INTRAVENOUS at 17:58

## 2021-01-01 RX ADMIN — PANTOPRAZOLE SODIUM 40 MILLIGRAM(S): 20 TABLET, DELAYED RELEASE ORAL at 04:34

## 2021-01-01 RX ADMIN — REMDESIVIR 500 MILLIGRAM(S): 5 INJECTION INTRAVENOUS at 16:48

## 2021-01-01 RX ADMIN — INSULIN GLARGINE 15 UNIT(S): 100 INJECTION, SOLUTION SUBCUTANEOUS at 09:57

## 2021-01-01 RX ADMIN — CEFEPIME 100 MILLIGRAM(S): 1 INJECTION, POWDER, FOR SOLUTION INTRAMUSCULAR; INTRAVENOUS at 05:11

## 2021-01-01 RX ADMIN — LOSARTAN POTASSIUM 50 MILLIGRAM(S): 100 TABLET, FILM COATED ORAL at 04:45

## 2021-01-01 RX ADMIN — Medication 8: at 16:27

## 2021-01-01 RX ADMIN — REMDESIVIR 500 MILLIGRAM(S): 5 INJECTION INTRAVENOUS at 16:24

## 2021-01-01 RX ADMIN — HEPARIN SODIUM 700 UNIT(S)/HR: 5000 INJECTION INTRAVENOUS; SUBCUTANEOUS at 06:28

## 2021-01-01 RX ADMIN — Medication 8 UNIT(S): at 12:24

## 2021-01-01 RX ADMIN — HEPARIN SODIUM 900 UNIT(S)/HR: 5000 INJECTION INTRAVENOUS; SUBCUTANEOUS at 21:22

## 2021-01-01 RX ADMIN — Medication 2.72 MICROGRAM(S)/KG/MIN: at 22:28

## 2021-01-01 RX ADMIN — MORPHINE SULFATE 1 MILLIGRAM(S): 50 CAPSULE, EXTENDED RELEASE ORAL at 21:09

## 2021-01-01 RX ADMIN — Medication 12.5 MILLIGRAM(S): at 06:06

## 2021-01-01 RX ADMIN — CHLORHEXIDINE GLUCONATE 1 APPLICATION(S): 213 SOLUTION TOPICAL at 02:17

## 2021-01-01 RX ADMIN — PROPOFOL 4.36 MICROGRAM(S)/KG/MIN: 10 INJECTION, EMULSION INTRAVENOUS at 22:08

## 2021-01-01 RX ADMIN — PANTOPRAZOLE SODIUM 40 MILLIGRAM(S): 20 TABLET, DELAYED RELEASE ORAL at 16:36

## 2021-01-01 RX ADMIN — Medication 2.72 MICROGRAM(S)/KG/MIN: at 03:00

## 2021-01-01 RX ADMIN — INSULIN GLARGINE 25 UNIT(S): 100 INJECTION, SOLUTION SUBCUTANEOUS at 21:08

## 2021-01-01 RX ADMIN — Medication 650 MILLIGRAM(S): at 19:53

## 2021-01-01 RX ADMIN — MORPHINE SULFATE 1 MILLIGRAM(S): 50 CAPSULE, EXTENDED RELEASE ORAL at 05:25

## 2021-01-01 RX ADMIN — Medication 8 UNIT(S): at 16:43

## 2021-01-01 RX ADMIN — Medication 8: at 17:49

## 2021-01-01 RX ADMIN — Medication 8 UNIT(S): at 17:31

## 2021-01-01 RX ADMIN — FENTANYL CITRATE 3.63 MICROGRAM(S)/KG/HR: 50 INJECTION INTRAVENOUS at 06:11

## 2021-01-01 RX ADMIN — PROPOFOL 4.36 MICROGRAM(S)/KG/MIN: 10 INJECTION, EMULSION INTRAVENOUS at 11:14

## 2021-01-01 RX ADMIN — Medication 6: at 17:37

## 2021-01-01 RX ADMIN — Medication 6 MILLIGRAM(S): at 06:03

## 2021-01-01 RX ADMIN — CISATRACURIUM BESYLATE 13.1 MICROGRAM(S)/KG/MIN: 2 INJECTION INTRAVENOUS at 22:10

## 2021-01-01 RX ADMIN — FENTANYL CITRATE 3.63 MICROGRAM(S)/KG/HR: 50 INJECTION INTRAVENOUS at 18:36

## 2021-01-01 RX ADMIN — Medication 10: at 11:20

## 2021-01-01 RX ADMIN — CHLORHEXIDINE GLUCONATE 15 MILLILITER(S): 213 SOLUTION TOPICAL at 05:49

## 2021-01-01 RX ADMIN — Medication 4 UNIT(S): at 17:06

## 2021-01-01 RX ADMIN — PROPOFOL 4.36 MICROGRAM(S)/KG/MIN: 10 INJECTION, EMULSION INTRAVENOUS at 06:57

## 2021-01-01 RX ADMIN — IRON SUCROSE 110 MILLIGRAM(S): 20 INJECTION, SOLUTION INTRAVENOUS at 17:31

## 2021-01-01 RX ADMIN — Medication 2: at 08:38

## 2021-01-01 RX ADMIN — VASOPRESSIN 2.4 UNIT(S)/MIN: 20 INJECTION INTRAVENOUS at 10:09

## 2021-01-01 RX ADMIN — Medication 8 UNIT(S): at 08:38

## 2021-01-01 RX ADMIN — Medication 40 MILLIGRAM(S): at 16:41

## 2021-01-01 RX ADMIN — Medication 650 MILLIGRAM(S): at 23:53

## 2021-01-01 RX ADMIN — LIDOCAINE 1 PATCH: 4 CREAM TOPICAL at 09:07

## 2021-01-01 RX ADMIN — CISATRACURIUM BESYLATE 13.1 MICROGRAM(S)/KG/MIN: 2 INJECTION INTRAVENOUS at 00:06

## 2021-01-01 RX ADMIN — Medication 650 MILLIGRAM(S): at 21:28

## 2021-01-01 RX ADMIN — REMDESIVIR 500 MILLIGRAM(S): 5 INJECTION INTRAVENOUS at 19:06

## 2021-01-01 RX ADMIN — REMDESIVIR 500 MILLIGRAM(S): 5 INJECTION INTRAVENOUS at 18:06

## 2021-01-01 RX ADMIN — Medication 4: at 08:32

## 2021-01-01 RX ADMIN — PANTOPRAZOLE SODIUM 40 MILLIGRAM(S): 20 TABLET, DELAYED RELEASE ORAL at 05:26

## 2021-01-01 RX ADMIN — HEPARIN SODIUM 1300 UNIT(S)/HR: 5000 INJECTION INTRAVENOUS; SUBCUTANEOUS at 11:18

## 2021-01-01 RX ADMIN — LIDOCAINE 1 PATCH: 4 CREAM TOPICAL at 07:14

## 2021-01-01 RX ADMIN — MORPHINE SULFATE 2 MILLIGRAM(S): 50 CAPSULE, EXTENDED RELEASE ORAL at 05:26

## 2021-01-01 RX ADMIN — MEROPENEM 100 MILLIGRAM(S): 1 INJECTION INTRAVENOUS at 05:52

## 2021-01-01 RX ADMIN — INSULIN GLARGINE 25 UNIT(S): 100 INJECTION, SOLUTION SUBCUTANEOUS at 22:01

## 2021-01-01 RX ADMIN — LIDOCAINE 1 PATCH: 4 CREAM TOPICAL at 06:27

## 2021-01-01 RX ADMIN — INSULIN GLARGINE 15 UNIT(S): 100 INJECTION, SOLUTION SUBCUTANEOUS at 21:35

## 2021-01-01 RX ADMIN — ENOXAPARIN SODIUM 40 MILLIGRAM(S): 100 INJECTION SUBCUTANEOUS at 12:24

## 2021-01-01 RX ADMIN — LOSARTAN POTASSIUM 50 MILLIGRAM(S): 100 TABLET, FILM COATED ORAL at 06:03

## 2021-01-01 RX ADMIN — MORPHINE SULFATE 2 MILLIGRAM(S): 50 CAPSULE, EXTENDED RELEASE ORAL at 09:08

## 2021-01-01 RX ADMIN — MIDODRINE HYDROCHLORIDE 15 MILLIGRAM(S): 2.5 TABLET ORAL at 15:54

## 2021-01-01 RX ADMIN — CHLORHEXIDINE GLUCONATE 15 MILLILITER(S): 213 SOLUTION TOPICAL at 04:26

## 2021-01-01 RX ADMIN — MORPHINE SULFATE 2 MILLIGRAM(S): 50 CAPSULE, EXTENDED RELEASE ORAL at 00:20

## 2021-01-01 RX ADMIN — LOSARTAN POTASSIUM 50 MILLIGRAM(S): 100 TABLET, FILM COATED ORAL at 05:31

## 2021-01-01 RX ADMIN — CHLORHEXIDINE GLUCONATE 1 APPLICATION(S): 213 SOLUTION TOPICAL at 06:10

## 2021-01-01 RX ADMIN — Medication 8 UNIT(S): at 08:32

## 2021-01-01 RX ADMIN — MIDODRINE HYDROCHLORIDE 15 MILLIGRAM(S): 2.5 TABLET ORAL at 22:47

## 2021-01-01 RX ADMIN — Medication 325 MILLIGRAM(S): at 11:05

## 2021-01-01 RX ADMIN — Medication 6.81 MICROGRAM(S)/KG/MIN: at 11:00

## 2021-01-01 RX ADMIN — HEPARIN SODIUM 700 UNIT(S)/HR: 5000 INJECTION INTRAVENOUS; SUBCUTANEOUS at 04:00

## 2021-01-01 RX ADMIN — Medication 2.72 MICROGRAM(S)/KG/MIN: at 21:07

## 2021-01-01 RX ADMIN — ATORVASTATIN CALCIUM 10 MILLIGRAM(S): 80 TABLET, FILM COATED ORAL at 22:01

## 2021-01-01 RX ADMIN — ENOXAPARIN SODIUM 70 MILLIGRAM(S): 100 INJECTION SUBCUTANEOUS at 05:32

## 2021-01-01 RX ADMIN — Medication 8 UNIT(S): at 17:32

## 2021-01-01 RX ADMIN — ALTEPLASE 25 MILLIGRAM(S): KIT at 22:00

## 2021-01-01 RX ADMIN — MORPHINE SULFATE 2 MILLIGRAM(S): 50 CAPSULE, EXTENDED RELEASE ORAL at 08:58

## 2021-01-01 RX ADMIN — ATORVASTATIN CALCIUM 10 MILLIGRAM(S): 80 TABLET, FILM COATED ORAL at 23:17

## 2021-01-01 RX ADMIN — Medication 4: at 15:11

## 2021-01-01 RX ADMIN — Medication 2: at 05:21

## 2021-01-01 RX ADMIN — REMDESIVIR 500 MILLIGRAM(S): 5 INJECTION INTRAVENOUS at 16:32

## 2021-01-01 RX ADMIN — Medication 650 MILLIGRAM(S): at 01:14

## 2021-01-01 RX ADMIN — Medication 4: at 16:41

## 2021-01-01 RX ADMIN — Medication 4: at 12:19

## 2021-01-01 RX ADMIN — ENOXAPARIN SODIUM 70 MILLIGRAM(S): 100 INJECTION SUBCUTANEOUS at 17:06

## 2021-01-01 RX ADMIN — Medication 40 MILLIGRAM(S): at 11:15

## 2021-01-01 RX ADMIN — Medication 6 MILLIGRAM(S): at 17:59

## 2021-01-01 RX ADMIN — CHLORHEXIDINE GLUCONATE 1 APPLICATION(S): 213 SOLUTION TOPICAL at 05:49

## 2021-01-01 RX ADMIN — Medication 4: at 18:03

## 2021-01-01 RX ADMIN — Medication 6: at 13:17

## 2021-01-01 RX ADMIN — CHLORHEXIDINE GLUCONATE 15 MILLILITER(S): 213 SOLUTION TOPICAL at 22:01

## 2021-01-01 RX ADMIN — CEFEPIME 100 MILLIGRAM(S): 1 INJECTION, POWDER, FOR SOLUTION INTRAMUSCULAR; INTRAVENOUS at 10:41

## 2021-01-01 RX ADMIN — INSULIN GLARGINE 15 UNIT(S): 100 INJECTION, SOLUTION SUBCUTANEOUS at 18:29

## 2021-01-01 RX ADMIN — PANTOPRAZOLE SODIUM 40 MILLIGRAM(S): 20 TABLET, DELAYED RELEASE ORAL at 17:29

## 2021-01-01 RX ADMIN — Medication 40 MILLIGRAM(S): at 17:50

## 2021-01-01 RX ADMIN — Medication 3.38 MICROGRAM(S)/KG/MIN: at 01:23

## 2021-01-01 RX ADMIN — LIDOCAINE 1 PATCH: 4 CREAM TOPICAL at 08:05

## 2021-01-01 RX ADMIN — Medication 4: at 17:32

## 2021-01-01 RX ADMIN — MEROPENEM 100 MILLIGRAM(S): 1 INJECTION INTRAVENOUS at 17:06

## 2021-01-01 RX ADMIN — LIDOCAINE 1 PATCH: 4 CREAM TOPICAL at 03:42

## 2021-01-01 RX ADMIN — PROPOFOL 4.36 MICROGRAM(S)/KG/MIN: 10 INJECTION, EMULSION INTRAVENOUS at 18:24

## 2021-01-01 RX ADMIN — PROPOFOL 4.36 MICROGRAM(S)/KG/MIN: 10 INJECTION, EMULSION INTRAVENOUS at 05:15

## 2021-01-01 RX ADMIN — HEPARIN SODIUM 0 UNIT(S)/HR: 5000 INJECTION INTRAVENOUS; SUBCUTANEOUS at 20:10

## 2021-01-01 RX ADMIN — Medication 4 UNIT(S): at 05:21

## 2021-01-01 RX ADMIN — SODIUM CHLORIDE 1000 MILLILITER(S): 9 INJECTION, SOLUTION INTRAVENOUS at 03:21

## 2021-01-01 RX ADMIN — ENOXAPARIN SODIUM 70 MILLIGRAM(S): 100 INJECTION SUBCUTANEOUS at 19:07

## 2021-01-01 RX ADMIN — PANTOPRAZOLE SODIUM 40 MILLIGRAM(S): 20 TABLET, DELAYED RELEASE ORAL at 12:44

## 2021-01-01 RX ADMIN — Medication 8 UNIT(S): at 08:29

## 2021-01-01 RX ADMIN — Medication 100 MILLIGRAM(S): at 10:02

## 2021-01-01 RX ADMIN — Medication 3.38 MICROGRAM(S)/KG/MIN: at 22:30

## 2021-01-01 RX ADMIN — ENOXAPARIN SODIUM 70 MILLIGRAM(S): 100 INJECTION SUBCUTANEOUS at 04:33

## 2021-01-01 RX ADMIN — MEROPENEM 100 MILLIGRAM(S): 1 INJECTION INTRAVENOUS at 06:18

## 2021-01-01 RX ADMIN — HEPARIN SODIUM 700 UNIT(S)/HR: 5000 INJECTION INTRAVENOUS; SUBCUTANEOUS at 04:33

## 2021-01-01 RX ADMIN — MORPHINE SULFATE 2 MILLIGRAM(S): 50 CAPSULE, EXTENDED RELEASE ORAL at 20:59

## 2021-01-01 RX ADMIN — LIDOCAINE 1 PATCH: 4 CREAM TOPICAL at 22:27

## 2021-01-01 RX ADMIN — Medication 6 MILLIGRAM(S): at 04:50

## 2021-01-01 RX ADMIN — HEPARIN SODIUM 700 UNIT(S)/HR: 5000 INJECTION INTRAVENOUS; SUBCUTANEOUS at 05:29

## 2021-01-01 RX ADMIN — Medication 12.5 MILLIGRAM(S): at 04:22

## 2021-01-01 RX ADMIN — Medication 40 MILLIGRAM(S): at 06:12

## 2021-01-01 RX ADMIN — PANTOPRAZOLE SODIUM 40 MILLIGRAM(S): 20 TABLET, DELAYED RELEASE ORAL at 11:28

## 2021-01-01 RX ADMIN — CEFEPIME 100 MILLIGRAM(S): 1 INJECTION, POWDER, FOR SOLUTION INTRAMUSCULAR; INTRAVENOUS at 05:17

## 2021-01-01 RX ADMIN — Medication 50 MILLILITER(S): at 16:40

## 2021-01-01 RX ADMIN — CHLORHEXIDINE GLUCONATE 15 MILLILITER(S): 213 SOLUTION TOPICAL at 05:31

## 2021-01-01 RX ADMIN — PROPOFOL 4.36 MICROGRAM(S)/KG/MIN: 10 INJECTION, EMULSION INTRAVENOUS at 09:58

## 2021-01-01 RX ADMIN — CHLORHEXIDINE GLUCONATE 1 APPLICATION(S): 213 SOLUTION TOPICAL at 07:50

## 2021-01-01 RX ADMIN — ENOXAPARIN SODIUM 70 MILLIGRAM(S): 100 INJECTION SUBCUTANEOUS at 06:08

## 2021-01-01 RX ADMIN — Medication 2.72 MICROGRAM(S)/KG/MIN: at 06:38

## 2021-01-01 RX ADMIN — Medication 2: at 08:19

## 2021-01-01 RX ADMIN — PANTOPRAZOLE SODIUM 40 MILLIGRAM(S): 20 TABLET, DELAYED RELEASE ORAL at 18:24

## 2021-01-01 RX ADMIN — Medication 2: at 08:59

## 2021-01-01 RX ADMIN — FENTANYL CITRATE 3.63 MICROGRAM(S)/KG/HR: 50 INJECTION INTRAVENOUS at 22:04

## 2021-01-01 RX ADMIN — FENTANYL CITRATE 3.63 MICROGRAM(S)/KG/HR: 50 INJECTION INTRAVENOUS at 11:28

## 2021-01-01 RX ADMIN — Medication 6: at 12:17

## 2021-01-01 RX ADMIN — Medication 6 UNIT(S): at 16:27

## 2021-01-01 RX ADMIN — ATORVASTATIN CALCIUM 10 MILLIGRAM(S): 80 TABLET, FILM COATED ORAL at 21:42

## 2021-01-01 RX ADMIN — Medication 4: at 11:31

## 2021-01-01 RX ADMIN — Medication 25 GRAM(S): at 04:34

## 2021-01-01 RX ADMIN — Medication 650 MILLIGRAM(S): at 13:42

## 2021-01-01 RX ADMIN — PANTOPRAZOLE SODIUM 40 MILLIGRAM(S): 20 TABLET, DELAYED RELEASE ORAL at 18:16

## 2021-01-01 RX ADMIN — ATORVASTATIN CALCIUM 10 MILLIGRAM(S): 80 TABLET, FILM COATED ORAL at 21:50

## 2021-01-01 RX ADMIN — MORPHINE SULFATE 1 MILLIGRAM(S): 50 CAPSULE, EXTENDED RELEASE ORAL at 04:46

## 2021-01-01 RX ADMIN — MORPHINE SULFATE 2 MILLIGRAM(S): 50 CAPSULE, EXTENDED RELEASE ORAL at 16:06

## 2021-01-01 RX ADMIN — Medication 12.5 MILLIGRAM(S): at 06:03

## 2021-01-01 RX ADMIN — CISATRACURIUM BESYLATE 13.1 MICROGRAM(S)/KG/MIN: 2 INJECTION INTRAVENOUS at 16:41

## 2021-01-01 RX ADMIN — Medication 12.5 MILLIGRAM(S): at 05:24

## 2021-01-01 RX ADMIN — Medication 40 MILLIGRAM(S): at 06:18

## 2021-01-01 RX ADMIN — Medication 2.72 MICROGRAM(S)/KG/MIN: at 18:13

## 2021-01-01 RX ADMIN — INSULIN GLARGINE 25 UNIT(S): 100 INJECTION, SOLUTION SUBCUTANEOUS at 21:51

## 2021-01-01 RX ADMIN — CHLORHEXIDINE GLUCONATE 15 MILLILITER(S): 213 SOLUTION TOPICAL at 17:20

## 2021-01-01 RX ADMIN — CYCLOBENZAPRINE HYDROCHLORIDE 5 MILLIGRAM(S): 10 TABLET, FILM COATED ORAL at 06:13

## 2021-01-01 RX ADMIN — HEPARIN SODIUM 0 UNIT(S)/HR: 5000 INJECTION INTRAVENOUS; SUBCUTANEOUS at 10:10

## 2021-01-01 RX ADMIN — HEPARIN SODIUM 6000 UNIT(S): 5000 INJECTION INTRAVENOUS; SUBCUTANEOUS at 11:16

## 2021-01-01 RX ADMIN — INSULIN GLARGINE 15 UNIT(S): 100 INJECTION, SOLUTION SUBCUTANEOUS at 12:03

## 2021-01-01 RX ADMIN — Medication 650 MILLIGRAM(S): at 16:26

## 2021-01-01 RX ADMIN — Medication 3.38 MICROGRAM(S)/KG/MIN: at 21:00

## 2021-01-01 RX ADMIN — Medication 8 UNIT(S): at 12:31

## 2021-01-01 RX ADMIN — INSULIN HUMAN 6 UNIT(S)/HR: 100 INJECTION, SOLUTION SUBCUTANEOUS at 23:00

## 2021-01-01 RX ADMIN — PANTOPRAZOLE SODIUM 40 MILLIGRAM(S): 20 TABLET, DELAYED RELEASE ORAL at 05:17

## 2021-01-01 RX ADMIN — MORPHINE SULFATE 1 MILLIGRAM(S): 50 CAPSULE, EXTENDED RELEASE ORAL at 14:49

## 2021-01-01 RX ADMIN — PROPOFOL 4.36 MICROGRAM(S)/KG/MIN: 10 INJECTION, EMULSION INTRAVENOUS at 07:20

## 2021-01-01 RX ADMIN — Medication 2.72 MICROGRAM(S)/KG/MIN: at 18:39

## 2021-01-01 RX ADMIN — IRON SUCROSE 110 MILLIGRAM(S): 20 INJECTION, SOLUTION INTRAVENOUS at 16:29

## 2021-01-01 RX ADMIN — Medication 150 MEQ/KG/HR: at 01:26

## 2021-01-01 RX ADMIN — Medication 650 MILLIGRAM(S): at 21:14

## 2021-01-01 RX ADMIN — HEPARIN SODIUM 1100 UNIT(S)/HR: 5000 INJECTION INTRAVENOUS; SUBCUTANEOUS at 03:14

## 2021-01-01 RX ADMIN — VASOPRESSIN 2.4 UNIT(S)/MIN: 20 INJECTION INTRAVENOUS at 23:54

## 2021-01-01 RX ADMIN — Medication 50 MILLIGRAM(S): at 00:18

## 2021-01-01 RX ADMIN — Medication 100 MILLIGRAM(S): at 15:28

## 2021-01-01 RX ADMIN — Medication 8 UNIT(S): at 10:17

## 2021-01-01 RX ADMIN — Medication 25 GRAM(S): at 20:06

## 2021-01-01 RX ADMIN — INSULIN GLARGINE 5 UNIT(S): 100 INJECTION, SOLUTION SUBCUTANEOUS at 21:28

## 2021-01-01 RX ADMIN — REMDESIVIR 500 MILLIGRAM(S): 5 INJECTION INTRAVENOUS at 16:31

## 2021-01-01 RX ADMIN — MEROPENEM 100 MILLIGRAM(S): 1 INJECTION INTRAVENOUS at 17:25

## 2021-01-01 RX ADMIN — LOSARTAN POTASSIUM 50 MILLIGRAM(S): 100 TABLET, FILM COATED ORAL at 05:24

## 2021-01-01 RX ADMIN — PANTOPRAZOLE SODIUM 40 MILLIGRAM(S): 20 TABLET, DELAYED RELEASE ORAL at 06:29

## 2021-01-01 RX ADMIN — Medication 40 MILLIGRAM(S): at 22:39

## 2021-01-01 RX ADMIN — INSULIN GLARGINE 15 UNIT(S): 100 INJECTION, SOLUTION SUBCUTANEOUS at 10:37

## 2021-01-01 RX ADMIN — Medication 8 UNIT(S): at 13:29

## 2021-01-01 RX ADMIN — PROPOFOL 4.36 MICROGRAM(S)/KG/MIN: 10 INJECTION, EMULSION INTRAVENOUS at 16:41

## 2021-01-01 RX ADMIN — ENOXAPARIN SODIUM 70 MILLIGRAM(S): 100 INJECTION SUBCUTANEOUS at 04:48

## 2021-01-01 RX ADMIN — LIDOCAINE 1 PATCH: 4 CREAM TOPICAL at 10:23

## 2021-01-01 RX ADMIN — LOSARTAN POTASSIUM 50 MILLIGRAM(S): 100 TABLET, FILM COATED ORAL at 04:34

## 2021-01-01 RX ADMIN — INSULIN HUMAN 6 UNIT(S)/HR: 100 INJECTION, SOLUTION SUBCUTANEOUS at 23:45

## 2021-01-01 RX ADMIN — Medication 8 UNIT(S): at 18:01

## 2021-01-01 RX ADMIN — MEROPENEM 100 MILLIGRAM(S): 1 INJECTION INTRAVENOUS at 16:40

## 2021-01-01 RX ADMIN — Medication 2.72 MICROGRAM(S)/KG/MIN: at 16:27

## 2021-01-01 RX ADMIN — VASOPRESSIN 2.4 UNIT(S)/MIN: 20 INJECTION INTRAVENOUS at 05:24

## 2021-01-01 RX ADMIN — MORPHINE SULFATE 1 MILLIGRAM(S): 50 CAPSULE, EXTENDED RELEASE ORAL at 11:42

## 2021-01-01 RX ADMIN — CHLORHEXIDINE GLUCONATE 15 MILLILITER(S): 213 SOLUTION TOPICAL at 17:50

## 2021-01-01 RX ADMIN — Medication 2.72 MICROGRAM(S)/KG/MIN: at 23:27

## 2021-01-01 RX ADMIN — CISATRACURIUM BESYLATE 13.1 MICROGRAM(S)/KG/MIN: 2 INJECTION INTRAVENOUS at 15:42

## 2021-01-01 RX ADMIN — Medication 12.5 MILLIGRAM(S): at 05:31

## 2021-01-01 RX ADMIN — REMDESIVIR 500 MILLIGRAM(S): 5 INJECTION INTRAVENOUS at 16:26

## 2021-01-01 RX ADMIN — CEFEPIME 100 MILLIGRAM(S): 1 INJECTION, POWDER, FOR SOLUTION INTRAMUSCULAR; INTRAVENOUS at 22:32

## 2021-01-01 RX ADMIN — CHLORHEXIDINE GLUCONATE 15 MILLILITER(S): 213 SOLUTION TOPICAL at 16:40

## 2021-01-01 RX ADMIN — Medication 40 MILLIGRAM(S): at 11:29

## 2021-01-01 RX ADMIN — PROPOFOL 4.36 MICROGRAM(S)/KG/MIN: 10 INJECTION, EMULSION INTRAVENOUS at 01:00

## 2021-01-01 RX ADMIN — Medication 40 MILLIGRAM(S): at 01:28

## 2021-01-01 RX ADMIN — FAMOTIDINE 20 MILLIGRAM(S): 10 INJECTION INTRAVENOUS at 11:10

## 2021-01-01 RX ADMIN — FAMOTIDINE 20 MILLIGRAM(S): 10 INJECTION INTRAVENOUS at 11:15

## 2021-01-01 RX ADMIN — CEFEPIME 100 MILLIGRAM(S): 1 INJECTION, POWDER, FOR SOLUTION INTRAMUSCULAR; INTRAVENOUS at 22:34

## 2021-01-01 RX ADMIN — CEFEPIME 100 MILLIGRAM(S): 1 INJECTION, POWDER, FOR SOLUTION INTRAMUSCULAR; INTRAVENOUS at 14:48

## 2021-01-01 RX ADMIN — FAMOTIDINE 20 MILLIGRAM(S): 10 INJECTION INTRAVENOUS at 11:29

## 2021-01-01 RX ADMIN — MORPHINE SULFATE 1 MILLIGRAM(S): 50 CAPSULE, EXTENDED RELEASE ORAL at 08:29

## 2021-01-01 RX ADMIN — Medication 6: at 12:53

## 2021-01-01 RX ADMIN — PROPOFOL 4.36 MICROGRAM(S)/KG/MIN: 10 INJECTION, EMULSION INTRAVENOUS at 11:29

## 2021-01-01 RX ADMIN — Medication 8 UNIT(S): at 11:21

## 2021-01-01 RX ADMIN — HEPARIN SODIUM 0 UNIT(S)/HR: 5000 INJECTION INTRAVENOUS; SUBCUTANEOUS at 10:28

## 2021-01-01 RX ADMIN — PROPOFOL 4.36 MICROGRAM(S)/KG/MIN: 10 INJECTION, EMULSION INTRAVENOUS at 01:39

## 2021-01-01 RX ADMIN — Medication 40 MILLIGRAM(S): at 05:50

## 2021-01-01 RX ADMIN — CHLORHEXIDINE GLUCONATE 15 MILLILITER(S): 213 SOLUTION TOPICAL at 05:37

## 2021-01-01 RX ADMIN — PROPOFOL 4.36 MICROGRAM(S)/KG/MIN: 10 INJECTION, EMULSION INTRAVENOUS at 10:43

## 2021-01-01 RX ADMIN — CHLORHEXIDINE GLUCONATE 15 MILLILITER(S): 213 SOLUTION TOPICAL at 17:38

## 2021-01-01 RX ADMIN — PHENYLEPHRINE HYDROCHLORIDE 27.2 MICROGRAM(S)/KG/MIN: 10 INJECTION INTRAVENOUS at 01:00

## 2021-01-01 RX ADMIN — PROPOFOL 4.36 MICROGRAM(S)/KG/MIN: 10 INJECTION, EMULSION INTRAVENOUS at 15:08

## 2021-01-01 RX ADMIN — Medication 40 MILLIGRAM(S): at 23:15

## 2021-01-01 RX ADMIN — DEXMEDETOMIDINE HYDROCHLORIDE IN 0.9% SODIUM CHLORIDE 9.08 MICROGRAM(S)/KG/HR: 4 INJECTION INTRAVENOUS at 11:59

## 2021-01-01 RX ADMIN — Medication 8 UNIT(S): at 08:58

## 2021-01-01 RX ADMIN — Medication 40 MILLIGRAM(S): at 11:12

## 2021-01-01 RX ADMIN — PANTOPRAZOLE SODIUM 40 MILLIGRAM(S): 20 TABLET, DELAYED RELEASE ORAL at 07:51

## 2021-01-01 RX ADMIN — VASOPRESSIN 2.4 UNIT(S)/MIN: 20 INJECTION INTRAVENOUS at 18:04

## 2021-01-01 RX ADMIN — Medication 650 MILLIGRAM(S): at 23:51

## 2021-01-01 RX ADMIN — PANTOPRAZOLE SODIUM 40 MILLIGRAM(S): 20 TABLET, DELAYED RELEASE ORAL at 17:48

## 2021-01-01 RX ADMIN — Medication 150 MEQ/KG/HR: at 18:44

## 2021-01-01 RX ADMIN — ATORVASTATIN CALCIUM 10 MILLIGRAM(S): 80 TABLET, FILM COATED ORAL at 22:29

## 2021-01-01 RX ADMIN — INSULIN HUMAN 6 UNIT(S)/HR: 100 INJECTION, SOLUTION SUBCUTANEOUS at 17:00

## 2021-01-01 RX ADMIN — Medication 6 MILLIGRAM(S): at 05:24

## 2021-01-01 RX ADMIN — SODIUM CHLORIDE 1000 MILLILITER(S): 9 INJECTION, SOLUTION INTRAVENOUS at 23:47

## 2021-01-01 RX ADMIN — CHLORHEXIDINE GLUCONATE 15 MILLILITER(S): 213 SOLUTION TOPICAL at 17:01

## 2021-01-01 RX ADMIN — IRON SUCROSE 110 MILLIGRAM(S): 20 INJECTION, SOLUTION INTRAVENOUS at 17:38

## 2021-01-01 RX ADMIN — Medication 650 MILLIGRAM(S): at 05:32

## 2021-01-01 RX ADMIN — HYDROMORPHONE HYDROCHLORIDE 2 MILLIGRAM(S): 2 INJECTION INTRAMUSCULAR; INTRAVENOUS; SUBCUTANEOUS at 11:37

## 2021-01-01 RX ADMIN — Medication 2.72 MICROGRAM(S)/KG/MIN: at 16:46

## 2021-01-01 RX ADMIN — PROPOFOL 4.36 MICROGRAM(S)/KG/MIN: 10 INJECTION, EMULSION INTRAVENOUS at 14:48

## 2021-01-01 RX ADMIN — Medication 40 MILLIGRAM(S): at 17:06

## 2021-01-01 RX ADMIN — PROPOFOL 4.36 MICROGRAM(S)/KG/MIN: 10 INJECTION, EMULSION INTRAVENOUS at 09:26

## 2021-01-01 RX ADMIN — ENOXAPARIN SODIUM 70 MILLIGRAM(S): 100 INJECTION SUBCUTANEOUS at 17:48

## 2021-01-01 RX ADMIN — CHLORHEXIDINE GLUCONATE 15 MILLILITER(S): 213 SOLUTION TOPICAL at 17:40

## 2021-01-01 RX ADMIN — Medication 650 MILLIGRAM(S): at 04:52

## 2021-01-01 RX ADMIN — FAMOTIDINE 20 MILLIGRAM(S): 10 INJECTION INTRAVENOUS at 11:12

## 2021-01-01 RX ADMIN — Medication 650 MILLIGRAM(S): at 03:44

## 2021-01-01 RX ADMIN — VASOPRESSIN 2.4 UNIT(S)/MIN: 20 INJECTION INTRAVENOUS at 23:28

## 2021-01-01 RX ADMIN — FENTANYL CITRATE 3.63 MICROGRAM(S)/KG/HR: 50 INJECTION INTRAVENOUS at 23:58

## 2021-01-01 RX ADMIN — LOSARTAN POTASSIUM 50 MILLIGRAM(S): 100 TABLET, FILM COATED ORAL at 06:00

## 2021-01-01 RX ADMIN — Medication 4: at 10:21

## 2021-01-01 RX ADMIN — PANTOPRAZOLE SODIUM 40 MILLIGRAM(S): 20 TABLET, DELAYED RELEASE ORAL at 05:24

## 2021-01-01 RX ADMIN — Medication 40 MILLIGRAM(S): at 05:36

## 2021-01-01 RX ADMIN — Medication 6 MILLIGRAM(S): at 05:31

## 2021-01-01 RX ADMIN — CHLORHEXIDINE GLUCONATE 15 MILLILITER(S): 213 SOLUTION TOPICAL at 18:15

## 2021-01-01 RX ADMIN — DEXMEDETOMIDINE HYDROCHLORIDE IN 0.9% SODIUM CHLORIDE 9.08 MICROGRAM(S)/KG/HR: 4 INJECTION INTRAVENOUS at 10:21

## 2021-01-01 RX ADMIN — Medication 4: at 11:24

## 2021-01-01 RX ADMIN — Medication 8 UNIT(S): at 11:42

## 2021-01-01 RX ADMIN — Medication 650 MILLIGRAM(S): at 00:01

## 2021-01-01 RX ADMIN — Medication 4 UNIT(S): at 00:40

## 2021-01-01 RX ADMIN — LOSARTAN POTASSIUM 50 MILLIGRAM(S): 100 TABLET, FILM COATED ORAL at 04:22

## 2021-01-01 RX ADMIN — INSULIN GLARGINE 15 UNIT(S): 100 INJECTION, SOLUTION SUBCUTANEOUS at 08:59

## 2021-01-01 RX ADMIN — Medication 6 MILLIGRAM(S): at 04:45

## 2021-01-01 RX ADMIN — Medication 650 MILLIGRAM(S): at 21:51

## 2021-01-01 RX ADMIN — PROPOFOL 4.36 MICROGRAM(S)/KG/MIN: 10 INJECTION, EMULSION INTRAVENOUS at 16:46

## 2021-01-01 RX ADMIN — MORPHINE SULFATE 1 MILLIGRAM(S): 50 CAPSULE, EXTENDED RELEASE ORAL at 01:36

## 2021-01-01 RX ADMIN — MORPHINE SULFATE 2 MILLIGRAM(S): 50 CAPSULE, EXTENDED RELEASE ORAL at 18:53

## 2021-01-01 RX ADMIN — REMDESIVIR 500 MILLIGRAM(S): 5 INJECTION INTRAVENOUS at 16:57

## 2021-01-01 RX ADMIN — PANTOPRAZOLE SODIUM 40 MILLIGRAM(S): 20 TABLET, DELAYED RELEASE ORAL at 06:17

## 2021-01-01 RX ADMIN — PANTOPRAZOLE SODIUM 40 MILLIGRAM(S): 20 TABLET, DELAYED RELEASE ORAL at 06:00

## 2021-01-01 RX ADMIN — Medication 150 MEQ/KG/HR: at 11:06

## 2021-01-01 RX ADMIN — MORPHINE SULFATE 1 MILLIGRAM(S): 50 CAPSULE, EXTENDED RELEASE ORAL at 23:04

## 2021-01-01 RX ADMIN — MORPHINE SULFATE 1 MILLIGRAM(S): 50 CAPSULE, EXTENDED RELEASE ORAL at 14:37

## 2021-01-01 RX ADMIN — LIDOCAINE 1 PATCH: 4 CREAM TOPICAL at 06:14

## 2021-01-01 RX ADMIN — Medication 2 MILLIGRAM(S): at 11:40

## 2021-01-01 RX ADMIN — LIDOCAINE 1 PATCH: 4 CREAM TOPICAL at 23:21

## 2021-01-01 RX ADMIN — Medication 8 UNIT(S): at 18:04

## 2021-01-01 RX ADMIN — HEPARIN SODIUM 0 UNIT(S)/HR: 5000 INJECTION INTRAVENOUS; SUBCUTANEOUS at 05:22

## 2021-01-01 RX ADMIN — ENOXAPARIN SODIUM 70 MILLIGRAM(S): 100 INJECTION SUBCUTANEOUS at 17:02

## 2021-01-01 RX ADMIN — Medication 650 MILLIGRAM(S): at 22:29

## 2021-01-01 RX ADMIN — PROPOFOL 4.36 MICROGRAM(S)/KG/MIN: 10 INJECTION, EMULSION INTRAVENOUS at 20:45

## 2021-01-01 RX ADMIN — ATORVASTATIN CALCIUM 10 MILLIGRAM(S): 80 TABLET, FILM COATED ORAL at 21:15

## 2021-01-01 RX ADMIN — Medication 8 UNIT(S): at 09:07

## 2021-01-01 RX ADMIN — Medication 40 MILLIGRAM(S): at 00:02

## 2021-01-01 RX ADMIN — MORPHINE SULFATE 2 MILLIGRAM(S): 50 CAPSULE, EXTENDED RELEASE ORAL at 14:23

## 2021-01-24 NOTE — ED PROVIDER NOTE - PATIENT PORTAL LINK FT
You can access the FollowMyHealth Patient Portal offered by [facility] by registering at the following website: [email or web address]. By joining [employer]’s FollowMyHealth portal, you will also be able to view your health information using other applications (apps) compatible with our system.

## 2021-01-24 NOTE — ED PROVIDER NOTE - CLINICAL SUMMARY MEDICAL DECISION MAKING FREE TEXT BOX
61 y/o female with pmhx of hypercholesterolemia, htn and dm presents with c/o fever and occasional shortness of breath x 1 day.

## 2021-01-24 NOTE — ED PROVIDER NOTE - OBJECTIVE STATEMENT
59 y/o female with pmhx of hypercholesterolemia, htn and dm presents with c/o fever and occasional chest "discomfort" x 1 day. She reported to the ER today because she noticed her spo2 had decreased to 90 at home. Pt states she was tested + for covid yesterday, since then she has been having high fevers, has been taking tylenol with relief, + body aches, + diarrhea. she has been eating and drinking without issue. Denies vomiting, chest pain, loss of taste and smell. denies urinary complaints.

## 2021-01-24 NOTE — ED PROVIDER NOTE - ATTENDING CONTRIBUTION TO CARE
AJM: pt presenting with worsening covid symptoms. VSS. No resp distress. No hypoxia at rest or with ambulation. dc with return precautions
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
## 2021-01-24 NOTE — ED PROVIDER NOTE - NSFOLLOWUPINSTRUCTIONS_ED_ALL_ED_FT
- Follow up with your doctor within 2-3 days.   - Take Tylenol (Acetaminophen) 650mg or Motrin (Ibuprofen/Advil) 600mg every 6 hours as needed for pain.   - Stay well hydrated.   - Stay at home until you receive test results.   - See attached COVID-19 instructions.   - Return to the ED for any new or worsening symptoms.     Viral Respiratory Infection    A viral respiratory infection is an illness that affects parts of the body used for breathing, like the lungs, nose, and throat. It is caused by a germ called a virus. Symptoms can include runny nose, coughing, sneezing, fatigue, body aches, sore throat, fever, or headache. Over the counter medicine can be used to manage the symptoms but the infection typically goes away on its own in 5 to 10 days.     SEEK IMMEDIATE MEDICAL CARE IF YOU HAVE ANY OF THE FOLLOWING SYMPTOMS: shortness of breath, chest pain, fever over 10 days, or lightheadedness/dizziness.

## 2021-01-24 NOTE — ED PROVIDER NOTE - PROGRESS NOTE DETAILS
spo2 remained above 94 after 1 minute of ambulation   pt denies shortness of breath at this time.   She is comfortable and exam is unremarkable   - RADHA Almanzar

## 2021-01-24 NOTE — ED PROVIDER NOTE - FAMILY HISTORY
No pertinent family history in first degree relatives     No pertinent family history in first degree relatives

## 2021-01-25 NOTE — H&P ADULT - NSHPLABSRESULTS_GEN_ALL_CORE
10.8   5.88  )-----------( 146      ( 25 Jan 2021 16:22 )             32.4         01-25    136  |  101  |  11.0  ----------------------------<  205<H>  4.0   |  22.0  |  0.66    Ca    8.8      25 Jan 2021 16:22    TPro  6.8  /  Alb  3.6  /  TBili  0.3<L>  /  DBili  x   /  AST  32<H>  /  ALT  30  /  AlkPhos  57  01-25

## 2021-01-25 NOTE — H&P ADULT - ASSESSMENT
61 y/o F w/ a PMH of DMII, HTN came in c/o sob worsening over the past week and associated naussea and NBNB vomiting x 2 days.  Pt was recently diagnosed as an outpt w/ COVID on 1/22 and was told by PCP if her O2 sat went <92 she should go to the hospital.  Pt came to the ED 2 days ago and O2 sat was noted to be >90% and was discharged home.  Pts SOB did not improve and she came back in today.  Pt also reports subjective fevers/chills at home but denies cp, palpitations, abd pain, lower extremity swelling/pain. 59 y/o F w/ a PMH of DMII, HTN came in c/o sob worsening over the past week and associated N/V and subjective fevers x 2 days.  Pt was diagnosed COVID+ on 1/22 outpt and was told by PCP if her O2 sat went <92 she should go to the hospital.  Pt came to the ED 2 days ago and O2 sat was noted to be >90% and was discharged home.  Pts SOB did not improve and she came back in today.  VS on arrival were significant for tachycardia, tachypnea and hypoxia of 85% ORA.  Pt had increased WOB and was speaking 4-5 word sentences.  w/u is significant of elevated inflammatory markers except for DD that was WNL.  CXR was (-).  COVID and viral pcr pending.  Pt was given decadron 6mg in the ED and placed on supplemental O2 2L NC w/ improved O2 saturation.  Pt will be admitted for sepsis 2/2 COVID PNA.        Sepsis 2/2 COVID PNA  - Admit to tele/  - Pt tested +outpt on 1/22 for COVID  - COVID and RVP ordered on admission pending  - Low suspicion for PE at this time given improved VS w/ supplemental O2  - If decompensates w/u for PE including CTA  - Monitor on tele/  - c/w Decadron 6mg IVP day 1  - Will start remdesivir once have +COVID result  - Trend DD, LDH, procal, CRP, Ferritin q48h   - Monitor LFTs and renal function daily w/ remdesivir use  - Tylenol prn for temp >100.4      Normocytic anemia  - Baseline H/H unknown  - Hemodynamically stable at this time  - No signs of active bleeding  - Will order anemia panel   - Monitor H/H daily and transfuse if Hb <7      DM II  - Will order A1c  - Hold metformin while in hospital and resume upon d/c  - Place on basal/bolus regimen and titrate to goal serum glucose <180   - ISS and hypoglycemic protocol on board  - Monitor glucose closely given pt now on steroids       HTN, essential  - c/w home medications        VTE ppx: Lovenox SQ    Dispo: admit to tele/.  If pt remains stable anticipate d/c in 5 days.

## 2021-01-25 NOTE — H&P ADULT - HISTORY OF PRESENT ILLNESS
59 y/o F w/ a PMH of DMII, HTN came in c/o sob worsening over the past week and associated naussea and NBNB vomiting x 2 days.  Pt was recently diagnosed as an outpt w/ COVID on 1/22 and was told by PCP if her O2 sat went <92 she should go to the hospital.  Pt came to the ED 2 days ago and O2 sat was noted to be >90% and was discharged home.  Pts SOB did not improve and she came back in today.  Pt also reports subjective fevers/chills at home but denies cp, palpitations, abd pain, lower extremity swelling/pain.

## 2021-01-25 NOTE — H&P ADULT - NSHPREVIEWOFSYSTEMS_GEN_ALL_CORE
CONSTITUTIONAL: + fevers, +chills, no night sweats, weight changes  HEENT: no vision changes or diplopia, no tinnitus, no sore throat  RESPIRATORY: (+)dyspnea, (+)sob, (-)nocturnal cough, sputum or hemoptysis  CARDIOVASCULAR: no CP, palpitations or lower extremity edema  GI: no dysphagia, +nausea, no abd pain, constipation, diarrhea, stool change or blood in stool  : no dysuria or hematuria, no flank pain, no incontinence or urinary retention  MSK: no joint pain or swelling  INTEGUMENTARY: no rashes or lesions  NEUROLOGICAL: no HA, confusion, syncope, numbness, weakness, tremors or ataxia  PSYCH: denies depression  ENDOCRINE: no polyuria, polydipsia, no temp intolerance, no tremors, no changes in skin, hair or nails  HEMATOLOGIC/LYMPHATIC: no lymph node enlargement, abnormal bruising or bleeding

## 2021-01-25 NOTE — ED ADULT NURSE NOTE - NSIMPLEMENTINTERV_GEN_ALL_ED
Implemented All Universal Safety Interventions:  [place] to call system. Call bell, personal items and telephone within reach. Instruct patient to call for assistance. Room bathroom lighting operational. Non-slip footwear when patient is off stretcher. Physically safe environment: no spills, clutter or unnecessary equipment. Stretcher in lowest position, wheels locked, appropriate side rails in place.

## 2021-01-25 NOTE — ED PROVIDER NOTE - OBJECTIVE STATEMENT
60yof w/ HTN, HLD, DM2, COVID positive outpatient 3 days ago p/w dyspnea. She has been having cough, fever and shortness of breath for the past 3 days, seen yesterday in ED but had reassuring exam and no hypoxia and was discharged home, today returns with gradually worsening dyspnea and now hypoxic to 85% on room air.

## 2021-01-25 NOTE — ED ADULT NURSE NOTE - OBJECTIVE STATEMENT
pt alert and oriented x4 came in c/o chest pain, SOB covid + 3 days ago. on arrival pt hypoxic, placed on 3 LNC. RR even unlabored. pt educated on plan of care, pt able to successfully teach back plan of care to RN, RN will continue to reeducate pt during hospital stay.

## 2021-01-25 NOTE — ED PROVIDER NOTE - CLINICAL SUMMARY MEDICAL DECISION MAKING FREE TEXT BOX
Known COVID-19 with progression of disease, now hypoxic on room air requiring nasal cannula for supplementation. Will give IV dexamethasone, require admission due to hypoxia.

## 2021-01-25 NOTE — H&P ADULT - NSHPPHYSICALEXAM_GEN_ALL_CORE
GENERAL: pt examined bedside, appears uncomfortable but in NAD, on 2LNC   HEENT: NC/AT, dry oral mucosa, clear conjunctiva, sclera nonicteric, EOMI  RESPIRATORY: increased respiratory effort, CTA b/l, no wheezing, rhonchi, rales  CARDIOVASCULAR: fast rate, regular rhyth, normal S1 and S2, no murmur/rub/gallop  ABDOMEN: soft, NT/ND, normoactive bowel sounds, no rebound/guarding  MSK: no joint swelling or erythema  EXTREMITIES: No cynaosis, no clubbing, no lower extremity edema; Peripheral pulses are 2+ bilaterally  PSYCH: affect appropriate and cooperative  NEUROLOGY: A+O to person, place, and time, no focal neurologic deficits appreciated   SKIN: No rashes or no palpable lesions

## 2021-01-26 NOTE — PROGRESS NOTE ADULT - ASSESSMENT
59 y/o F w/ a PMH of DMII, HTN came in c/o sob worsening over the past week and associated N/V and subjective fevers x 2 days.  Pt was diagnosed COVID+ on 1/22 outpt and was told by PCP if her O2 sat went <92 she should go to the hospital.  Pt came to the ED 2 days ago and O2 sat was noted to be >90% and was discharged home.  Pts SOB did not improve and she came back in today.  VS on arrival were significant for tachycardia, tachypnea and hypoxia of 85% ORA.  Pt had increased WOB and was speaking 4-5 word sentences.  w/u is significant of elevated inflammatory markers except for DD that was WNL.  CXR was (-).  COVID and viral pcr pending.  Pt was given decadron 6mg in the ED and placed on supplemental O2 2L NC w/ improved O2 saturation.  Pt admitted for sepsis 2/2 COVID PNA.        Acute hypoxic respiratory failure 2/2 covid pna  - Covid positive  - Started on remdesivir day 1/5  - On Decadron 6mg day 2/10  - Monitor closely on   - Low suspicion for PE at this time given improved VS w/ supplemental O2  - If decompensates w/u for PE including CTA  - Encourage prone ventilation and incentive spirometry      Sepsis 2/2 COVID PNA  - Pt tested +outpt on 1/22 for COVID  - COVID and RVP ordered on admission pending  - Monitor on tele/  - c/w Decadron 6mg IVP day 2  - c/w remdesivir day 1/5  - Trend DD, LDH, procal, CRP, Ferritin q48h   - Monitor LFTs and renal function daily w/ remdesivir use  - Tylenol prn for temp >100.4      Normocytic anemia  - Baseline H/H unknown  - H/H stable and no signs of active bleeding  - Will start on 5 day course of venofer severe iron deficiency  - Monitor H/H daily and transfuse if Hb <7      DM II w/ hyperglycemia   - Hyperglycemia exacerbated by steroid use   - Uncontrolled DM w/ A1c 8.3  - Hold metformin while in hospital and resume upon d/c  - Place on basal/bolus regimen and titrate to goal serum glucose <180   - ISS and hypoglycemic protocol on board  - Monitor glucose closely given pt now on steroids       HTN, essential  - c/w home medications        VTE ppx: Lovenox SQ    Dispo: admit to tele/.  If pt remains stable anticipate d/c in 5 days.    61 y/o F w/ a PMH of DMII, HTN came in c/o sob worsening over the past week and associated N/V and subjective fevers x 2 days.  Pt was diagnosed COVID+ on 1/22 outpt and was told by PCP if her O2 sat went <92 she should go to the hospital.  Pt came to the ED 2 days ago and O2 sat was noted to be >90% and was discharged home.  Pts SOB did not improve and she came back in today.  VS on arrival were significant for tachycardia, tachypnea and hypoxia of 85% ORA.  Pt had increased WOB and was speaking 4-5 word sentences.  w/u is significant of elevated inflammatory markers except for DD that was WNL.  CXR was (-).  COVID and viral pcr pending.  Pt was given decadron 6mg in the ED and placed on supplemental O2 2L NC w/ improved O2 saturation.  Pt admitted for sepsis 2/2 COVID PNA.        Acute hypoxic respiratory failure 2/2 covid pna  - Covid positive  - Started on remdesivir day 1/5  - On Decadron 6mg day 2/10  - Monitor closely on   - Low suspicion for PE at this time given improved VS w/ supplemental O2  - If decompensates w/u for PE including CTA  - Encourage prone ventilation and incentive spirometry      Sepsis 2/2 COVID PNA  - Pt tested +outpt on 1/22 for COVID  - COVID and RVP ordered on admission pending  - Monitor on tele/  - c/w Decadron 6mg IVP day 2  - c/w remdesivir day 1/5  - Trend DD, LDH, procal, CRP, Ferritin q48h   - Monitor LFTs and renal function daily w/ remdesivir use  - Tylenol prn for temp >100.4      Normocytic anemia  - Baseline H/H unknown  - H/H stable and no signs of active bleeding  - Will start on 5 day course of venofer severe iron deficiency  - Monitor H/H daily and transfuse if Hb <7      DM II w/ hyperglycemia   - Hyperglycemia exacerbated by steroid use   - Uncontrolled DM w/ A1c 8.3  - Hold metformin while in hospital and resume upon d/c  - Place on basal/bolus regimen and titrate to goal serum glucose <180   - ISS and hypoglycemic protocol on board  - Monitor glucose closely given pt now on steroids       Hyponatremia, likely pseudo in the setting of hyperglycemia  - Corrected Na for glucose is WNL  - Will monitor lytes closely   - Adjust insulin to correct hyperglycemia       HTN, essential  - c/w home medications        VTE ppx: Lovenox SQ    Dispo:  No plans for discharge at this time however anticipate d/c home in 5 days, upon completion of remdesivir if pt clinically improves and stable for discharge.

## 2021-01-26 NOTE — CHART NOTE - NSCHARTNOTEFT_GEN_A_CORE
Called by RN, reporting pt had been on 6L NC and desatted to 85%- placed on 50% VM and saturation came up to 94%. Chest PT performed and pt c/o cough.  Tessalon Pearls ordered.  Pt seen lying in bed on side, watching tv, appears in no distress.  Pt reports she feels better now, that she had felt SOB before and RN helped her with oxygen.  Pt can speak unlabored. A & O x 3, NAD.  O2 sat 94% on 50% VM- pt encouraged to prone position.  - taper supplemental O2 as tolerated.

## 2021-01-26 NOTE — PROGRESS NOTE ADULT - SUBJECTIVE AND OBJECTIVE BOX
Chief Complaint:      SUBJECTIVE / OVERNIGHT EVENTS:    Patient denies chest pain, SOB, abd pain, N/V, fever, chills, dysuria or any other complaints. All remainder ROS negative.       I&O's Summary        PHYSICAL EXAM:  Vital Signs Last 24 Hrs  T(C): 36.5 (26 Jan 2021 09:00), Max: 38.5 (25 Jan 2021 22:05)  T(F): 97.7 (26 Jan 2021 09:00), Max: 101.3 (25 Jan 2021 22:05)  HR: 92 (26 Jan 2021 09:00) (92 - 103)  BP: 108/72 (26 Jan 2021 09:00) (108/72 - 130/79)  BP(mean): --  RR: 19 (26 Jan 2021 09:00) (19 - 22)  SpO2: 90% (26 Jan 2021 09:30) (85% - 94%)      CONSTITUTIONAL: pt examined bedside, laying comfortably in bed in NAD  HEENT: NC/AT, moist oral mucosa, clear conjunctiva, sclera nonicteric, EOMI  RESPIRATORY: Normal respiratory effort; CTA b/l, no wheezing, rhonchi, rales  CARDIOVASCULAR: RRR, normal S1 and S2, no murmur/rub/gallop  ABDOMEN: soft, NT/ND, normoactive bowel sounds, no rebound/guarding, no HSM  MUSCLOSKELETAL:  no joint swelling or tenderness to palpation  EXTREMITIES: No cynaosis, no clubbing, no lower extremity edema; Peripheral pulses are 2+ bilaterally  PSYCH: affect appropriate and cooperative  NEUROLOGY: A+O to person, place, and time, no focal neurologic deficits appreciated   SKIN: No rashes or no palpable lesions        LABS:                        10.3   6.83  )-----------( 163      ( 26 Jan 2021 07:57 )             31.2     01-26    131<L>  |  97<L>  |  12.0  ----------------------------<  227<H>  4.0   |  23.0  |  0.61    Ca    9.0      26 Jan 2021 07:57    TPro  6.9  /  Alb  3.5  /  TBili  0.4  /  DBili  0.1  /  AST  29  /  ALT  27  /  AlkPhos  59  01-26              CAPILLARY BLOOD GLUCOSE      POCT Blood Glucose.: 240 mg/dL (26 Jan 2021 08:14)  POCT Blood Glucose.: 223 mg/dL (25 Jan 2021 21:21)        RADIOLOGY & ADDITIONAL TESTS:          MEDICATIONS  (STANDING):  atorvastatin 10 milliGRAM(s) Oral at bedtime  dexAMETHasone     Tablet 6 milliGRAM(s) Oral daily  dextrose 40% Gel 15 Gram(s) Oral once  dextrose 5%. 1000 milliLiter(s) (50 mL/Hr) IV Continuous <Continuous>  dextrose 5%. 1000 milliLiter(s) (100 mL/Hr) IV Continuous <Continuous>  dextrose 50% Injectable 25 Gram(s) IV Push once  dextrose 50% Injectable 25 Gram(s) IV Push once  enoxaparin Injectable 40 milliGRAM(s) SubCutaneous daily  glucagon  Injectable 1 milliGRAM(s) IntraMuscular once  hydrochlorothiazide 12.5 milliGRAM(s) Oral daily  insulin glargine Injectable (LANTUS) 10 Unit(s) SubCutaneous at bedtime  insulin lispro (ADMELOG) corrective regimen sliding scale   SubCutaneous three times a day before meals  insulin lispro Injectable (ADMELOG) 4 Unit(s) SubCutaneous three times a day before meals  losartan 50 milliGRAM(s) Oral daily  pantoprazole    Tablet 40 milliGRAM(s) Oral before breakfast  remdesivir  IVPB   IV Intermittent   remdesivir  IVPB 200 milliGRAM(s) IV Intermittent every 24 hours    MEDICATIONS  (PRN):  acetaminophen   Tablet .. 650 milliGRAM(s) Oral every 6 hours PRN Temp greater or equal to 38C (100.4F), Mild Pain (1 - 3)                                     Chief Complaint:  SOB    SUBJECTIVE / OVERNIGHT EVENTS: Desaturated overnight to the mid 80's while sleeping.  NC increased to 3-4L and responded.  Pt now on 2L and saturating 93% and speaking full sentences.      Patient denies chest pain, abd pain, N/V, fever, chills, dysuria or any other complaints. All remainder ROS negative.       I&O's Summary        PHYSICAL EXAM:  Vital Signs Last 24 Hrs  T(C): 36.5 (26 Jan 2021 09:00), Max: 38.5 (25 Jan 2021 22:05)  T(F): 97.7 (26 Jan 2021 09:00), Max: 101.3 (25 Jan 2021 22:05)  HR: 92 (26 Jan 2021 09:00) (92 - 103)  BP: 108/72 (26 Jan 2021 09:00) (108/72 - 130/79)  BP(mean): --  RR: 19 (26 Jan 2021 09:00) (19 - 22)  SpO2: 90% (26 Jan 2021 09:30) (85% - 94%)    CONSTITUTIONAL: pt examined bedside, laying comfortably in bed in NAD, speaking full sentences on 2L NC  HEENT: NC/AT, moist oral mucosa, clear conjunctiva, sclera nonicteric, EOMI  RESPIRATORY: Normal respiratory effort; CTA b/l, no wheezing, rhonchi, rales  CARDIOVASCULAR: RRR, normal S1 and S2, no murmur/rub/gallop  ABDOMEN: soft, NT/ND, normoactive bowel sounds, no rebound/guarding, no HSM  MUSCLOSKELETAL:  no joint swelling or tenderness to palpation  EXTREMITIES: No cynaosis, no clubbing, no lower extremity edema; Peripheral pulses are 2+ bilaterally  PSYCH: affect appropriate and cooperative  NEUROLOGY: A+O to person, place, and time, no focal neurologic deficits appreciated   SKIN: No rashes or no palpable lesions        LABS:                                          10.3   6.83  )-----------( 163      ( 26 Jan 2021 07:57 )             31.2       01-26    131<L>  |  97<L>  |  12.0  ----------------------------<  227<H>  4.0   |  23.0  |  0.61    Ca    9.0      26 Jan 2021 07:57    TPro  6.9  /  Alb  3.5  /  TBili  0.4  /  DBili  0.1  /  AST  29  /  ALT  27  /  AlkPhos  59  01-26              CAPILLARY BLOOD GLUCOSE      POCT Blood Glucose.: 240 mg/dL (26 Jan 2021 08:14)  POCT Blood Glucose.: 223 mg/dL (25 Jan 2021 21:21)        RADIOLOGY & ADDITIONAL TESTS:          MEDICATIONS  (STANDING):  atorvastatin 10 milliGRAM(s) Oral at bedtime  dexAMETHasone     Tablet 6 milliGRAM(s) Oral daily  dextrose 40% Gel 15 Gram(s) Oral once  dextrose 5%. 1000 milliLiter(s) (50 mL/Hr) IV Continuous <Continuous>  dextrose 5%. 1000 milliLiter(s) (100 mL/Hr) IV Continuous <Continuous>  dextrose 50% Injectable 25 Gram(s) IV Push once  dextrose 50% Injectable 25 Gram(s) IV Push once  enoxaparin Injectable 40 milliGRAM(s) SubCutaneous daily  glucagon  Injectable 1 milliGRAM(s) IntraMuscular once  hydrochlorothiazide 12.5 milliGRAM(s) Oral daily  insulin glargine Injectable (LANTUS) 10 Unit(s) SubCutaneous at bedtime  insulin lispro (ADMELOG) corrective regimen sliding scale   SubCutaneous three times a day before meals  insulin lispro Injectable (ADMELOG) 4 Unit(s) SubCutaneous three times a day before meals  losartan 50 milliGRAM(s) Oral daily  pantoprazole    Tablet 40 milliGRAM(s) Oral before breakfast  remdesivir  IVPB   IV Intermittent   remdesivir  IVPB 200 milliGRAM(s) IV Intermittent every 24 hours    MEDICATIONS  (PRN):  acetaminophen   Tablet .. 650 milliGRAM(s) Oral every 6 hours PRN Temp greater or equal to 38C (100.4F), Mild Pain (1 - 3)

## 2021-01-27 NOTE — PROGRESS NOTE ADULT - ASSESSMENT
61 y/o F w/ a PMH of DMII, HTN came in c/o sob worsening over the past week and associated N/V and subjective fevers x 2 days.  Pt was diagnosed COVID+ on 1/22 outpt and was told by PCP if her O2 sat went <92 she should go to the hospital.  Pt came to the ED 2 days ago and O2 sat was noted to be >90% and was discharged home.  Pts SOB did not improve and she came back in today.  VS on arrival were significant for tachycardia, tachypnea and hypoxia of 85% ORA.  Pt had increased WOB and was speaking 4-5 word sentences.  w/u is significant of elevated inflammatory markers except for DD that was WNL.  CXR was (-).  COVID and viral pcr pending.  Pt was given decadron 6mg in the ED and placed on supplemental O2 2L NC w/ improved O2 saturation.  Pt admitted for sepsis 2/2 COVID PNA.        Acute hypoxic respiratory failure 2/2 covid pna  - Remains acute w/ acute decomponsation requiring increased oxygen demand, now on VM   - CTA and b/l LE dopplers ordered.  Ddimer slightly increased from yesterday however given risk of PE w/ COVID and acute respiratory decompensation will start empiric treatment pending results.   - Stat CXR ordered and shows increased b/l multifocal consolidations but no evidence of pneumothorax      - c/w remdesivir day 2/5  - c/w Decadron 6mg day 3/10  - Monitor closely on   - Encourage prone ventilation and incentive spirometry      Sepsis 2/2 COVID PNA  - Pt tested +outpt on 1/22 for COVID and +on admission  - Monitor on tele/  - c/w Decadron 6mg IVP day 3  - c/w remdesivir day 2/5  - Trend DD, LDH, procal, CRP, Ferritin q48h   - Monitor LFTs and renal function daily w/ remdesivir use  - Tylenol prn for temp >100.4      Normocytic anemia  - Baseline H/H unknown  - H/H stable and no signs of active bleeding  - c/w venofer for severe iron deficiency for 4 more days  - Monitor H/H daily and transfuse if Hb <7      DM II w/ hyperglycemia   - Hyperglycemia exacerbated by steroid use   - Uncontrolled DM w/ A1c 8.3  - Hold metformin while in hospital and resume upon d/c  - Place on basal/bolus regimen and titrate to goal serum glucose <180   - ISS and hypoglycemic protocol on board  - Monitor glucose closely given pt now on steroids       Hyponatremia, resolved  - Was likely pseudo in the setting of hyperglycemia  - Will monitor lytes closely        HTN, essential  - c/w home medications        VTE ppx: Lovenox SQ    Dispo:  No plans for discharge at this time as pt remains acute.  Pt acutely decompensated today however if stabilizes anticipate possible d/c home in 4-5 days.

## 2021-01-27 NOTE — PROGRESS NOTE ADULT - SUBJECTIVE AND OBJECTIVE BOX
Chief Complaint:  SOB    SUBJECTIVE / OVERNIGHT EVENTS: Supplemental O2 increased overnight to 6L and during the day became hypoxic requiring escalation of supplemental O2 to VM.  Pt reports nonproductive cough but offers no other complaints at this time.     Patient denies chest pain, abd pain, N/V, fever, chills, dysuria or any other complaints. All remainder ROS negative.       I&O's Summary        PHYSICAL EXAM:  Vital Signs Last 24 Hrs  T(C): 37.1 (27 Jan 2021 15:58), Max: 37.5 (27 Jan 2021 08:04)  T(F): 98.7 (27 Jan 2021 15:58), Max: 99.5 (27 Jan 2021 08:04)  HR: 81 (27 Jan 2021 15:58) (81 - 89)  BP: 108/66 (27 Jan 2021 15:58) (103/69 - 141/81)  BP(mean): --  RR: 18 (27 Jan 2021 15:58) (18 - 18)  SpO2: 99% (27 Jan 2021 15:58) (90% - 99%)      CONSTITUTIONAL: pt examined bedside, sitting up in bed, appears uncomfortable but in NAD, pt speaking 3-4 word sentences, on VM   HEENT: NC/AT, dry oral mucosa, clear conjunctiva, sclera nonicteric, EOMI  RESPIRATORY: no use of accessorys, Normal respiratory effort, scattered rales  CARDIOVASCULAR: RRR, normal S1 and S2, no murmur/rub/gallop  ABDOMEN: soft, NT/ND, normoactive bowel sounds, no rebound/guarding  EXTREMITIES: No cynaosis, no clubbing, no lower extremity edema; Peripheral pulses are 2+ bilaterally  NEUROLOGY: A+O to person, place, and time, no focal neurologic deficits appreciated   SKIN: No rashes or no palpable lesions        LABS:                                   10.6   8.39  )-----------( 191      ( 27 Jan 2021 08:56 )             31.9         01-27    135  |  100  |  23.0<H>  ----------------------------<  191<H>  4.1   |  25.0  |  0.67    Ca    9.2      27 Jan 2021 08:56    TPro  6.7  /  Alb  3.5  /  TBili  0.2<L>  /  DBili  0.1  /  AST  31  /  ALT  25  /  AlkPhos  63  01-27            CAPILLARY BLOOD GLUCOSE      POCT Blood Glucose.: 240 mg/dL (26 Jan 2021 08:14)  POCT Blood Glucose.: 223 mg/dL (25 Jan 2021 21:21)        RADIOLOGY & ADDITIONAL TESTS:          MEDICATIONS  (STANDING):  atorvastatin 10 milliGRAM(s) Oral at bedtime  dexAMETHasone     Tablet 6 milliGRAM(s) Oral daily  dextrose 40% Gel 15 Gram(s) Oral once  dextrose 5%. 1000 milliLiter(s) (50 mL/Hr) IV Continuous <Continuous>  dextrose 5%. 1000 milliLiter(s) (100 mL/Hr) IV Continuous <Continuous>  dextrose 50% Injectable 25 Gram(s) IV Push once  dextrose 50% Injectable 25 Gram(s) IV Push once  enoxaparin Injectable 40 milliGRAM(s) SubCutaneous daily  glucagon  Injectable 1 milliGRAM(s) IntraMuscular once  hydrochlorothiazide 12.5 milliGRAM(s) Oral daily  insulin glargine Injectable (LANTUS) 10 Unit(s) SubCutaneous at bedtime  insulin lispro (ADMELOG) corrective regimen sliding scale   SubCutaneous three times a day before meals  insulin lispro Injectable (ADMELOG) 4 Unit(s) SubCutaneous three times a day before meals  losartan 50 milliGRAM(s) Oral daily  pantoprazole    Tablet 40 milliGRAM(s) Oral before breakfast  remdesivir  IVPB   IV Intermittent   remdesivir  IVPB 200 milliGRAM(s) IV Intermittent every 24 hours    MEDICATIONS  (PRN):  acetaminophen   Tablet .. 650 milliGRAM(s) Oral every 6 hours PRN Temp greater or equal to 38C (100.4F), Mild Pain (1 - 3)

## 2021-01-28 NOTE — PROGRESS NOTE ADULT - SUBJECTIVE AND OBJECTIVE BOX
Chief Complaint:  sob    SUBJECTIVE / OVERNIGHT EVENTS: Pt desaturated overnight and now requiring 100%NRB.  Pt reports sob, banks and is speaking 3-4 word sentences.     Patient denies chest pain, SOB, abd pain, N/V, fever, chills, dysuria or any other complaints. All remainder ROS negative.       I&O's Summary        PHYSICAL EXAM:  Vital Signs Last 24 Hrs  T(C): 36.9 (28 Jan 2021 08:05), Max: 37.1 (27 Jan 2021 15:58)  T(F): 98.5 (28 Jan 2021 08:05), Max: 98.7 (27 Jan 2021 15:58)  HR: 84 (28 Jan 2021 08:05) (80 - 84)  BP: 121/69 (28 Jan 2021 08:05) (108/66 - 136/82)  BP(mean): --  RR: 18 (27 Jan 2021 21:00) (18 - 18)  SpO2: 94% (28 Jan 2021 08:05) (94% - 99%)      CONSTITUTIONAL: pt examined bedside, appears uncomfortable, mild distress, on NRB speaking 3-4 word sentences   HEENT: NC/AT, moist oral mucosa, pale conjunctiva, sclera nonicteric, EOMI  RESPIRATORY: no accessory use but has mild increase resp effort, poor inspiratory effort, scattered rales  CARDIOVASCULAR: RRR, normal S1 and S2, no murmur/rub/gallop  ABDOMEN: soft, NT/ND, normoactive bowel sounds, no rebound/guarding  MUSCLOSKELETAL:  no joint swelling or tenderness to palpation  EXTREMITIES: No cynaosis, no clubbing, no lower extremity edema; Peripheral pulses are 2+ bilaterally  PSYCH: affect appropriate and cooperative  NEUROLOGY: A+O to person, place, and time, no focal neurologic deficits appreciated   SKIN: No rashes or no palpable lesions        LABS:                        10.6   8.75  )-----------( 234      ( 28 Jan 2021 10:19 )             32.1     01-27    135  |  100  |  23.0<H>  ----------------------------<  191<H>  4.1   |  25.0  |  0.67    Ca    9.2      27 Jan 2021 08:56    TPro  7.1  /  Alb  3.6  /  TBili  0.2<L>  /  DBili  0.1  /  AST  33<H>  /  ALT  22  /  AlkPhos  72  01-28              CAPILLARY BLOOD GLUCOSE      POCT Blood Glucose.: 220 mg/dL (28 Jan 2021 08:26)  POCT Blood Glucose.: 212 mg/dL (27 Jan 2021 20:39)  POCT Blood Glucose.: 251 mg/dL (27 Jan 2021 17:05)  POCT Blood Glucose.: 260 mg/dL (27 Jan 2021 12:13)        RADIOLOGY & ADDITIONAL TESTS:          MEDICATIONS  (STANDING):  atorvastatin 10 milliGRAM(s) Oral at bedtime  dexAMETHasone     Tablet 6 milliGRAM(s) Oral daily  dextrose 40% Gel 15 Gram(s) Oral once  dextrose 5%. 1000 milliLiter(s) (50 mL/Hr) IV Continuous <Continuous>  dextrose 5%. 1000 milliLiter(s) (100 mL/Hr) IV Continuous <Continuous>  dextrose 50% Injectable 25 Gram(s) IV Push once  dextrose 50% Injectable 25 Gram(s) IV Push once  enoxaparin Injectable 40 milliGRAM(s) SubCutaneous daily  glucagon  Injectable 1 milliGRAM(s) IntraMuscular once  hydrochlorothiazide 12.5 milliGRAM(s) Oral daily  insulin glargine Injectable (LANTUS) 25 Unit(s) SubCutaneous at bedtime  insulin lispro (ADMELOG) corrective regimen sliding scale   SubCutaneous three times a day before meals  insulin lispro (ADMELOG) corrective regimen sliding scale   SubCutaneous at bedtime  insulin lispro Injectable (ADMELOG) 8 Unit(s) SubCutaneous three times a day before meals  iron sucrose IVPB 200 milliGRAM(s) IV Intermittent every 24 hours  losartan 50 milliGRAM(s) Oral daily  pantoprazole    Tablet 40 milliGRAM(s) Oral before breakfast  remdesivir  IVPB   IV Intermittent   remdesivir  IVPB 100 milliGRAM(s) IV Intermittent every 24 hours    MEDICATIONS  (PRN):  acetaminophen   Tablet .. 650 milliGRAM(s) Oral every 6 hours PRN Temp greater or equal to 38C (100.4F), Mild Pain (1 - 3)  benzonatate 100 milliGRAM(s) Oral every 8 hours PRN Cough

## 2021-01-28 NOTE — CONSULT NOTE ADULT - ASSESSMENT
61 y/o F w/ a PMH of DMII, HTN came in c/o sob worsening over the past week and associated nausea and NBNB vomiting x 2 days.  Pt was recently diagnosed as an outpt w/ COVID on 1/22 and was told by PCP if her O2 sat went <92 she should go to the hospital.  Pt came to the ED 2 days ago and O2 sat was noted to be >90% and was discharged home.  Pts SOB did not improve and she came back in today.  Pt also reports subjective fevers/chills at home but denies cp, palpitations, abd pain, lower extremity swelling/pain.    COVID 19 + infection  Viral pneumonia  Acute hypoxic respiratory failure      - patient is requiring supplemental O2  - inflammatory markers are elevated  - D dimer low  - procalcitonin 0.48  - check sputum CX  - Remdesivir 200 mg x 1 and then 100 mg daily x 5 days  - Decadron 6mg IV/PO daily while on Remdesivir  - discussed convalescent plasma and patient agreed  - Avoid antibiotics unless there is a concern for a bacterial infection or uptrending procalcitonin  - VTE prophylaxis per current [facility] system COVID 19 protocol  - Check CBC with diff, CMP with LFT's daily, Inflammatory markers, D dimer, procalcitonin q48h. Type and screen x 1  - Encourage self proning q2h, incentive spirometry and ambulation as tolerated  - Taper off O2 as tolerated- once tolerating room air would ideally monitor for 24h prior to discharge.  - Inpatient Contact/Airborne isolation         Discussed with Dr Craig and RN  Will follow

## 2021-01-28 NOTE — PROGRESS NOTE ADULT - ASSESSMENT
59 y/o F w/ a PMH of DMII, HTN came in c/o sob worsening over the past week and associated N/V and subjective fevers x 2 days.  Pt was diagnosed COVID+ on 1/22 outpt and was told by PCP if her O2 sat went <92 she should go to the hospital.  Pt came to the ED 2 days ago and O2 sat was noted to be >90% and was discharged home.  Pts SOB did not improve and she came back in today.  VS on arrival were significant for tachycardia, tachypnea and hypoxia of 85% ORA.  Pt had increased WOB and was speaking 4-5 word sentences.  w/u is significant of elevated inflammatory markers except for DD that was WNL.  CXR was (-).  COVID and viral pcr pending.  Pt was given decadron 6mg in the ED and placed on supplemental O2 2L NC w/ improved O2 saturation.  Pt admitted for sepsis 2/2 COVID PNA.        Acute hypoxic respiratory failure 2/2 covid pna  - Remains acute w/ acute decomponsation requiring increased oxygen demand, now on 100%NRB   - CTA and b/l LE dopplers negative for VTE      - c/w remdesivir day 3/5  - c/w Decadron 6mg day 4/10  - Monitor closely on   - ID consulted and will give plasma  - Encourage prone ventilation and incentive spirometry      Sepsis 2/2 COVID PNA  - Pt tested +outpt on 1/22 for COVID and +on admission  - Monitor on tele/  - c/w Decadron 6mg IVP day 4  - c/w remdesivir day 3/5  - Trend DD, LDH, procal, CRP, Ferritin q48h   - Monitor LFTs and renal function daily w/ remdesivir use  - ID starting pt on plasma  - Tylenol prn for temp >100.4      Normocytic anemia  - Baseline H/H unknown  - H/H stable and no signs of active bleeding  - c/w venofer for severe iron deficiency for 3 more days  - Monitor H/H daily and transfuse if Hb <7      DM II w/ hyperglycemia   - Hyperglycemia exacerbated by steroid use   - Uncontrolled DM w/ A1c 8.3  - Hold metformin while in hospital and resume upon d/c  - Place on basal/bolus regimen and titrate to goal serum glucose <180   - ISS and hypoglycemic protocol on board  - Monitor glucose closely given pt now on steroids       Hyponatremia, resolved  - Was likely pseudo in the setting of hyperglycemia  - Will monitor lytes closely        HTN, essential  - c/w home medications        VTE ppx: Lovenox SQ      Dispo:  No plans for discharge at this time as pt remains acute.   Updated family and discussed at length pts condition.

## 2021-01-28 NOTE — CONSULT NOTE ADULT - SUBJECTIVE AND OBJECTIVE BOX
[facility]  INFECTIOUS DISEASES AND INTERNAL MEDICINE at [place]  =======================================================  Miguel Aponte MD  Diplomates American Board of Internal Medicine and Infectious Diseases  Tel: [phone]      Fax: [phone]  =======================================================      N-[id number]  MAYA LOTT    CC: Patient is a 60y old  Female who presents with a chief complaint of SOB/COVID (2021 17:23)      60y  Female       Past Medical & Surgical Hx:  PAST MEDICAL & SURGICAL HISTORY:  Hyperlipidemia    Liver disease  (unable to take tylenol )    Hypertension    Diabetes    Diverticulitis    High cholesterol    HTN (hypertension)    DM (diabetes mellitus)    H/O:             Social Hx:    FAMILY HISTORY:  No pertinent family history in first degree relatives    No pertinent family history in first degree relatives        Allergies    Allergy Status Unknown  No Known Allergies    Intolerances             REVIEW OF SYSTEMS:  CONSTITUTIONAL:  No Fever or chills  HEENT:  No diplopia or blurred vision.  No earache, sore throat or runny nose.  CARDIOVASCULAR:  No pressure, squeezing, strangling, tightness, heaviness or aching about the chest, neck, axilla or epigastrium.  RESPIRATORY:  No cough, shortness of breath  GASTROINTESTINAL:  No nausea, vomiting or diarrhea.  GENITOURINARY:  No dysuria, frequency or urgency. No Blood in urine  MUSCULOSKELETAL:  no joint aches, no muscle pain  SKIN:  No change in skin, hair or nails.  NEUROLOGIC:  No Headaches, seizures or weakness.  PSYCHIATRIC:  No disorder of thought or mood.  ENDOCRINE:  No heat or cold intolerance  HEMATOLOGICAL:  No easy bruising or bleeding.       Physical Exam:    GEN: NAD, pleasant  HEENT: normocephalic and atraumatic. EOMI. PERRL.  Anicteric  NECK: Supple.   LUNGS: Clear to auscultation.  HEART: Regular rate and rhythm without murmur.  ABDOMEN: Soft, nontender, and nondistended.  Positive bowel sounds.    : No CVA tenderness  EXTREMITIES: Without any edema.  MSK: No joint swelling  NEUROLOGIC: Cranial nerves II through XII are grossly intact. No Focal Deficits  PSYCHIATRIC: Appropriate affect .  SKIN: No Rash        Vitals:    T(F): 98.5 (2021 08:05), Max: 98.7 (2021 15:58)  HR: 84 (2021 08:05)  BP: 121/69 (2021 08:05)  RR: 18 (2021 21:00)  SpO2: 94% (2021 08:05) (94% - 99%)  temp max in last 48H T(F): , Max: 99.5 (21 @ 08:04)    Current Antibiotics:  remdesivir  IVPB   IV Intermittent   remdesivir  IVPB 100 milliGRAM(s) IV Intermittent every 24 hours    Other medications:  atorvastatin 10 milliGRAM(s) Oral at bedtime  dexAMETHasone     Tablet 6 milliGRAM(s) Oral daily  dextrose 40% Gel 15 Gram(s) Oral once  dextrose 5%. 1000 milliLiter(s) IV Continuous <Continuous>  dextrose 5%. 1000 milliLiter(s) IV Continuous <Continuous>  dextrose 50% Injectable 25 Gram(s) IV Push once  dextrose 50% Injectable 25 Gram(s) IV Push once  enoxaparin Injectable 40 milliGRAM(s) SubCutaneous daily  glucagon  Injectable 1 milliGRAM(s) IntraMuscular once  hydrochlorothiazide 12.5 milliGRAM(s) Oral daily  insulin glargine Injectable (LANTUS) 25 Unit(s) SubCutaneous at bedtime  insulin lispro (ADMELOG) corrective regimen sliding scale   SubCutaneous three times a day before meals  insulin lispro (ADMELOG) corrective regimen sliding scale   SubCutaneous at bedtime  insulin lispro Injectable (ADMELOG) 8 Unit(s) SubCutaneous three times a day before meals  iron sucrose IVPB 200 milliGRAM(s) IV Intermittent every 24 hours  losartan 50 milliGRAM(s) Oral daily  pantoprazole    Tablet 40 milliGRAM(s) Oral before breakfast                            10.6   8.75  )-----------( 234      ( 2021 10:19 )             32.1         135  |  100  |  23.0<H>  ----------------------------<  191<H>  4.1   |  25.0  |  0.67    Ca    9.2      2021 08:56    TPro  7.1  /  Alb  3.6  /  TBili  0.2<L>  /  DBili  0.1  /  AST  33<H>  /  ALT  22  /  AlkPhos  72      RECENT CULTURES:   @ 18:35          [facility]      WBC Count: 8.75 K/uL (21 @ 10:19)  WBC Count: 8.39 K/uL (21 @ 08:56)  WBC Count: 6.83 K/uL (21 @ 07:57)  WBC Count: 5.88 K/uL (21 @ 16:22)    Creatinine, Serum: 0.67 mg/dL (21 @ 08:56)  Creatinine, Serum: 0.61 mg/dL (21 @ 07:57)  Creatinine, Serum: 0.61 mg/dL (21 @ 07:57)  Creatinine, Serum: 0.66 mg/dL (21 @ 16:22)    C-Reactive Protein, Serum: 5.83 mg/dL (21 @ 21:55)  C-Reactive Protein, Serum: 6.86 mg/dL (21 @ 16:22)    Ferritin, Serum: 915 ng/mL (21 @ 21:55)  Ferritin, Serum: 356 ng/mL (21 @ 16:22)      Procalcitonin, Serum: 0.48 ng/mL (21 @ 21:55)  Procalcitonin, Serum: 0.67 ng/mL (21 @ 16:22)     Rapid RVP Result: [facility] (21 @ 18:35)  SARS-CoV-2: Detected (21 @ 18:35)     [facility]  INFECTIOUS DISEASES AND INTERNAL MEDICINE at [place]  =======================================================  Miguel Aponte MD  Diplomates American Board of Internal Medicine and Infectious Diseases  Tel: [phone]      Fax: [phone]  =======================================================      [facility]-[id number]  MAYA LOTT    CC: Patient is a 60y old  Female who presents with a chief complaint of SOB/COVID (2021 17:23)      59 y/o F w/ a PMH of DMII, HTN came in c/o sob worsening over the past week and associated naussea and NBNB vomiting x 2 days.  Pt was recently diagnosed as an outpt w/ COVID on  and was told by PCP if her O2 sat went <92 she should go to the hospital.  Pt came to the ED 2 days ago and O2 sat was noted to be >90% and was discharged home.  Pts SOB did not improve and she came back in today.  Pt also reports subjective fevers/chills at home but denies cp, palpitations, abd pain, lower extremity swelling/pain.      Past Medical & Surgical Hx:  PAST MEDICAL & SURGICAL HISTORY:  Hyperlipidemia    Liver disease  (unable to take tylenol )    Hypertension    Diabetes    Diverticulitis    High cholesterol    HTN (hypertension)    DM (diabetes mellitus)    H/O:             Social Hx:    FAMILY HISTORY:  No pertinent family history in first degree relatives    No pertinent family history in first degree relatives        Allergies    Allergy Status Unknown  No Known Allergies    Intolerances             REVIEW OF SYSTEMS:  CONSTITUTIONAL:  No Fever or chills  HEENT:  No diplopia or blurred vision.  No earache, sore throat or runny nose.  CARDIOVASCULAR:  No pressure, squeezing, strangling, tightness, heaviness or aching about the chest, neck, axilla or epigastrium.  RESPIRATORY:  + cough, shortness of breath  GASTROINTESTINAL:  No nausea, vomiting or diarrhea.  GENITOURINARY:  No dysuria, frequency or urgency. No Blood in urine  MUSCULOSKELETAL:  no joint aches, no muscle pain  SKIN:  No change in skin, hair or nails.  NEUROLOGIC:  No Headaches, seizures or weakness.  PSYCHIATRIC:  No disorder of thought or mood.  ENDOCRINE:  No heat or cold intolerance  HEMATOLOGICAL:  No easy bruising or bleeding.       Physical Exam:    GEN: NAD, pleasant on NRb  HEENT: normocephalic and atraumatic. EOMI. PERRL.  Anicteric  NECK: Supple.   LUNGS: Clear to auscultation.  HEART: Regular rate and rhythm without murmur.  ABDOMEN: Soft, nontender, and nondistended.  Positive bowel sounds.    : No CVA tenderness  EXTREMITIES: Without any edema.  MSK: No joint swelling  NEUROLOGIC: Cranial nerves II through XII are grossly intact. No Focal Deficits  PSYCHIATRIC: Appropriate affect .  SKIN: No Rash        Vitals:    T(F): 98.5 (2021 08:05), Max: 98.7 (2021 15:58)  HR: 84 (2021 08:05)  BP: 121/69 (2021 08:05)  RR: 18 (2021 21:00)  SpO2: 94% (2021 08:05) (94% - 99%)  temp max in last 48H T(F): , Max: 99.5 (21 @ 08:04)    Current Antibiotics:  remdesivir  IVPB   IV Intermittent   remdesivir  IVPB 100 milliGRAM(s) IV Intermittent every 24 hours    Other medications:  atorvastatin 10 milliGRAM(s) Oral at bedtime  dexAMETHasone     Tablet 6 milliGRAM(s) Oral daily  dextrose 40% Gel 15 Gram(s) Oral once  dextrose 5%. 1000 milliLiter(s) IV Continuous <Continuous>  dextrose 5%. 1000 milliLiter(s) IV Continuous <Continuous>  dextrose 50% Injectable 25 Gram(s) IV Push once  dextrose 50% Injectable 25 Gram(s) IV Push once  enoxaparin Injectable 40 milliGRAM(s) SubCutaneous daily  glucagon  Injectable 1 milliGRAM(s) IntraMuscular once  hydrochlorothiazide 12.5 milliGRAM(s) Oral daily  insulin glargine Injectable (LANTUS) 25 Unit(s) SubCutaneous at bedtime  insulin lispro (ADMELOG) corrective regimen sliding scale   SubCutaneous three times a day before meals  insulin lispro (ADMELOG) corrective regimen sliding scale   SubCutaneous at bedtime  insulin lispro Injectable (ADMELOG) 8 Unit(s) SubCutaneous three times a day before meals  iron sucrose IVPB 200 milliGRAM(s) IV Intermittent every 24 hours  losartan 50 milliGRAM(s) Oral daily  pantoprazole    Tablet 40 milliGRAM(s) Oral before breakfast                            10.6   8.75  )-----------( 234      ( 2021 10:19 )             32.1         135  |  100  |  23.0<H>  ----------------------------<  191<H>  4.1   |  25.0  |  0.67    Ca    9.2      2021 08:56    TPro  7.1  /  Alb  3.6  /  TBili  0.2<L>  /  DBili  0.1  /  AST  33<H>  /  ALT  22  /  AlkPhos  72      RECENT CULTURES:   @ 18:35          [facility]      WBC Count: 8.75 K/uL (21 @ 10:19)  WBC Count: 8.39 K/uL (21 @ 08:56)  WBC Count: 6.83 K/uL (21 @ 07:57)  WBC Count: 5.88 K/uL (21 @ 16:22)    Creatinine, Serum: 0.67 mg/dL (21 @ 08:56)  Creatinine, Serum: 0.61 mg/dL (21 @ 07:57)  Creatinine, Serum: 0.61 mg/dL (21 @ 07:57)  Creatinine, Serum: 0.66 mg/dL (21 @ 16:22)    C-Reactive Protein, Serum: 5.83 mg/dL (21 @ 21:55)  C-Reactive Protein, Serum: 6.86 mg/dL (21 @ 16:22)    Ferritin, Serum: 915 ng/mL (21 @ 21:55)  Ferritin, Serum: 356 ng/mL (21 @ 16:22)      Procalcitonin, Serum: 0.48 ng/mL (21 @ 21:55)  Procalcitonin, Serum: 0.67 ng/mL (21 @ 16:22)     Rapid RVP Result: NotDetec (21 @ 18:35)  SARS-CoV-2: Detected (21 @ 18:35)    < from: CT Angio Chest w/ IV Cont (21 @ 18:46) >  FINDINGS:    LUNGS AND AIRWAYS: Patent central airways.  Lungs are remarkable for bilateral multifocal infiltrates likely representing pneumonia secondary to COVID given the clinical history. Infiltrates are more confluent at the lung bases..  PLEURA: No pleural effusion.  MEDIASTINUM AND HUSSAIN: No lymphadenopathy.  VESSELS: Aortic atherosclerosis and coronary artery calcification more than expected for the stated age.  HEART: Heart size is normal. No pericardial effusion.  CHEST WALL AND LOWER NECK: Within normal limits.  VISUALIZED UPPER ABDOMEN: Cholecystectomy clips  BONES: Degenerative changes are appreciated    IMPRESSION:  Bilateral infiltrates consistent with pneumonia secondary to COVID 19.  No evidence of pulmonary embolism  Atherosclerotic vasculature more than expected for the stated age.      < end of copied text >

## 2021-01-29 NOTE — CONSULT NOTE ADULT - PROBLEM SELECTOR RECOMMENDATION 9
- continue HFNC 100%, titrate O2 as tolerated  - ICU care not recommended at this time as pt is saturating well on HFNC  - recommend goals of care discussion if pt decompensates - continue with HFNC 100%, titrate O2 as tolerated  - ICU care not recommended at this time as pt is saturating well and remains stable on HFNC  - recommend goals of care discussion if pt decompensates  - recommend proning and ambulation - continue with HFNC 100%, titrate O2 as tolerated  - ICU care not recommended at this time as pt is saturating well and remains stable on HFNC  - recommend goals of care discussion so if  pt decompensates there is plan of care  - recommend self- proning and ambulation

## 2021-01-29 NOTE — PROGRESS NOTE ADULT - SUBJECTIVE AND OBJECTIVE BOX
[facility]  INFECTIOUS DISEASES AND INTERNAL MEDICINE at [place]  =======================================================  Miguel Aponte MD  Diplomates American Board of Internal Medicine and Infectious Diseases  Tel: [phone]      Fax: [phone]  =======================================================    MAYA LOTT [id number]    Follow up:      Allergies:  Allergy Status Unknown  No Known Allergies           REVIEW OF SYSTEMS:  CONSTITUTIONAL:  No Fever or chills  HEENT:   No diplopia or blurred vision.  No earache, sore throat or runny nose.  CARDIOVASCULAR:  No pressure, squeezing, strangling, tightness, heaviness or aching about the chest, neck, axilla or epigastrium.  RESPIRATORY:  No cough, shortness of breath  GASTROINTESTINAL:  No nausea, vomiting or diarrhea.  GENITOURINARY:  No dysuria, frequency or urgency. No Blood in urine  MUSCULOSKELETAL:  no joint aches, no muscle pain  SKIN:  No change in skin, hair or nails.  NEUROLOGIC:  No Headaches, seizures or weakness.  PSYCHIATRIC:  No disorder of thought or mood.  ENDOCRINE:  No heat or cold intolerance  HEMATOLOGICAL:  No easy bruising or bleeding.       Physical Exam:  GEN: NAD, pleasant  HEENT: normocephalic and atraumatic. EOMI. PERRL.  Anicteric   NECK: Supple.   LUNGS: Clear to auscultation.  HEART: Regular rate and rhythm without murmur.  ABDOMEN: Soft, nontender, and nondistended.  Positive bowel sounds.    : No CVA tenderness  EXTREMITIES: Without any edema.  MSK: no joint swelling  NEUROLOGIC: Cranial nerves II through XII are grossly intact. No focal deficits  PSYCHIATRIC: Appropriate affect .  SKIN: No Rash      Vitals:    T(F): 97.2 (29 Jan 2021 07:55), Max: 98.8 (29 Jan 2021 03:23)  HR: 80 (29 Jan 2021 07:55)  BP: 132/78 (29 Jan 2021 07:55)  RR: 18 (29 Jan 2021 07:55)  SpO2: 92% (29 Jan 2021 08:00) (91% - 97%)  temp max in last 48H T(F): , Max: 98.8 (01-29-21 @ 03:23)    Current Antibiotics:  remdesivir  IVPB   IV Intermittent   remdesivir  IVPB 100 milliGRAM(s) IV Intermittent every 24 hours    Other medications:  atorvastatin 10 milliGRAM(s) Oral at bedtime  dexAMETHasone     Tablet 6 milliGRAM(s) Oral daily  dextrose 40% Gel 15 Gram(s) Oral once  dextrose 5%. 1000 milliLiter(s) IV Continuous <Continuous>  dextrose 5%. 1000 milliLiter(s) IV Continuous <Continuous>  dextrose 50% Injectable 25 Gram(s) IV Push once  dextrose 50% Injectable 25 Gram(s) IV Push once  enoxaparin Injectable 40 milliGRAM(s) SubCutaneous daily  glucagon  Injectable 1 milliGRAM(s) IntraMuscular once  hydrochlorothiazide 12.5 milliGRAM(s) Oral daily  insulin glargine Injectable (LANTUS) 25 Unit(s) SubCutaneous at bedtime  insulin lispro (ADMELOG) corrective regimen sliding scale   SubCutaneous at bedtime  insulin lispro (ADMELOG) corrective regimen sliding scale   SubCutaneous three times a day before meals  insulin lispro Injectable (ADMELOG) 8 Unit(s) SubCutaneous three times a day before meals  iron sucrose IVPB 200 milliGRAM(s) IV Intermittent every 24 hours  losartan 50 milliGRAM(s) Oral daily  pantoprazole    Tablet 40 milliGRAM(s) Oral before breakfast                            10.6   8.75  )-----------( 234      ( 28 Jan 2021 10:19 )             32.1     01-29    x   |  x   |  x   ----------------------------<  x   x    |  x   |  0.57    Ca    8.8      28 Jan 2021 10:19    TPro  6.7  /  Alb  3.3  /  TBili  0.3<L>  /  DBili  0.1  /  AST  36<H>  /  ALT  23  /  AlkPhos  66  01-29    RECENT CULTURES:  01-25 @ 18:35          NotDete      WBC Count: 8.75 K/uL (01-28-21 @ 10:19)  WBC Count: 8.39 K/uL (01-27-21 @ 08:56)  WBC Count: 6.83 K/uL (01-26-21 @ 07:57)  WBC Count: 5.88 K/uL (01-25-21 @ 16:22)    Creatinine, Serum: 0.57 mg/dL (01-29-21 @ 06:11)  Creatinine, Serum: 0.69 mg/dL (01-28-21 @ 10:19)  Creatinine, Serum: 0.67 mg/dL (01-27-21 @ 08:56)  Creatinine, Serum: 0.61 mg/dL (01-26-21 @ 07:57)  Creatinine, Serum: 0.61 mg/dL (01-26-21 @ 07:57)  Creatinine, Serum: 0.66 mg/dL (01-25-21 @ 16:22)    C-Reactive Protein, Serum: 5.83 mg/dL (01-27-21 @ 21:55)  C-Reactive Protein, Serum: 6.86 mg/dL (01-25-21 @ 16:22)    Ferritin, Serum: 915 ng/mL (01-27-21 @ 21:55)  Ferritin, Serum: 356 ng/mL (01-25-21 @ 16:22)      Procalcitonin, Serum: 0.21 ng/mL (01-29-21 @ 06:11)  Procalcitonin, Serum: 0.48 ng/mL (01-27-21 @ 21:55)  Procalcitonin, Serum: 0.67 ng/mL (01-25-21 @ 16:22)     Rapid RVP Result: NotDetec (01-25-21 @ 18:35)  SARS-CoV-2: Detected (01-25-21 @ 18:35)

## 2021-01-29 NOTE — PROGRESS NOTE ADULT - ASSESSMENT
59 y/o F w/ a PMH of DMII, HTN came in c/o sob worsening over the past week and associated N/V and subjective fevers x 2 days.  Pt was diagnosed COVID+ on 1/22 outpt and was told by PCP if her O2 sat went <92 she should go to the hospital.  Pt came to the ED 2 days ago and O2 sat was noted to be >90% and was discharged home.  Pts SOB did not improve and she came back in today.  VS on arrival were significant for tachycardia, tachypnea and hypoxia of 85% ORA.  Pt had increased WOB and was speaking 4-5 word sentences.  w/u is significant of elevated inflammatory markers except for DD that was WNL.  CXR was (-).  COVID and viral pcr pending.  Pt was given decadron 6mg in the ED and placed on supplemental O2 2L NC w/ improved O2 saturation.  Pt admitted for sepsis 2/2 COVID PNA.        Acute hypoxic respiratory failure 2/2 covid pna  - Remains acute w/ acute decomponsation requiring increased oxygen demand, now on 100%NRB   - CTA and b/l LE dopplers negative for VTE      - c/w remdesivir day 3/5  - c/w Decadron 6mg day 4/10  - Monitor closely on   - ID consulted and will give plasma  - Encourage prone ventilation and incentive spirometry      Sepsis 2/2 COVID PNA  - Pt tested +outpt on 1/22 for COVID and +on admission  - Monitor on tele/  - c/w Decadron 6mg IVP day 4  - c/w remdesivir day 3/5  - Trend DD, LDH, procal, CRP, Ferritin q48h   - Monitor LFTs and renal function daily w/ remdesivir use  - ID starting pt on plasma  - Tylenol prn for temp >100.4      Normocytic anemia  - Baseline H/H unknown  - H/H stable and no signs of active bleeding  - c/w venofer for severe iron deficiency for 3 more days  - Monitor H/H daily and transfuse if Hb <7      DM II w/ hyperglycemia   - Hyperglycemia exacerbated by steroid use   - Uncontrolled DM w/ A1c 8.3  - Hold metformin while in hospital and resume upon d/c  - Place on basal/bolus regimen and titrate to goal serum glucose <180   - ISS and hypoglycemic protocol on board  - Monitor glucose closely given pt now on steroids       Hyponatremia, resolved  - Was likely pseudo in the setting of hyperglycemia  - Will monitor lytes closely        HTN, essential  - c/w home medications        VTE ppx: Lovenox SQ      Dispo:  No plans for discharge at this time as pt remains acute.   Updated family and discussed at length pts condition.   59 y/o F w/ a PMH of DMII, HTN came in c/o sob worsening over the past week and associated N/V and subjective fevers x 2 days.  Pt was diagnosed COVID+ on 1/22 outpt and was told by PCP if her O2 sat went <92 she should go to the hospital.  Pt came to the ED 2 days ago and O2 sat was noted to be >90% and was discharged home.  Pts SOB did not improve and she came back in today.  VS on arrival were significant for tachycardia, tachypnea and hypoxia of 85% ORA.  Pt had increased WOB and was speaking 4-5 word sentences.  w/u is significant of elevated inflammatory markers except for DD that was WNL.  CXR was (-).  COVID and viral pcr pending.  Pt was given decadron 6mg in the ED and placed on supplemental O2 2L NC w/ improved O2 saturation.  Pt admitted for sepsis 2/2 COVID PNA.        Acute hypoxic respiratory failure 2/2 covid pna  - Remains acute w/ acute decomponsation again requiring increased oxygen demand, now on high flow 100%   - Titrate off supplemental O2 as tolerated once stable  - Consulted ICU for further evaluation given pt may require BIPAP if oxygen sat does not improve  - ABG and CXR reviewed  - CTA and b/l LE dopplers negative for VTE      - c/w remdesivir day 4/5  - c/w Decadron 6mg day 5/10  - Monitor closely on   - ID consulted   - s/p plasma 1/28  - Encourage prone ventilation and incentive spirometry      Sepsis 2/2 COVID PNA  - Pt tested +outpt on 1/22 for COVID and +on admission  - Monitor on tele/  - c/w Decadron 6mg IVP day 5  - c/w remdesivir day 4/5  - Trend DD, LDH, procal, CRP, Ferritin q48h   - Monitor LFTs and renal function daily w/ remdesivir use  - ID administered plasma  - Tylenol prn for temp >100.4      Atypical chest pain, likely costochondritis   - pleuritic and reproducible on palpation suggestive of costochondritis   - Unlikely ACS.  Stat EKG NSR w/ no ischemic changes, trop (-) and no acute events on tele  - Continue to monitor on telemetry       Normocytic anemia  - Baseline H/H unknown  - H/H stable and no signs of active bleeding  - c/w venofer for severe iron deficiency for 2 more days then transition to po iron  - Monitor H/H daily and transfuse if Hb <7      DM II w/ hyperglycemia   - Hyperglycemia exacerbated by steroid use   - Uncontrolled DM w/ A1c 8.3  - Hold metformin while in hospital and resume upon d/c  - Place on basal/bolus regimen and titrate to goal serum glucose <180   - ISS and hypoglycemic protocol on board  - Monitor glucose closely given pt now on steroids       Hyponatremia, resolved  - Was likely pseudo in the setting of hyperglycemia  - Will monitor lytes closely        HTN, essential  - c/w home medications        VTE ppx: Lovenox SQ      Dispo:  No plans for discharge at this time as pt remains acute and ICU consulted.  Upgrade declined but if decompensates further may need reconsultation

## 2021-01-29 NOTE — CONSULT NOTE ADULT - PROBLEM SELECTOR RECOMMENDATION 2
- continue remdesivir  - continue decadron - continue remdesivir (day 4/5) and decadron (day 5/10)  - received convalescent plasma 1/28

## 2021-01-29 NOTE — CONSULT NOTE ADULT - ASSESSMENT
61 y/o female with PMH of DMII and HTN with  61 y/o female with PMH of DMII and HTN with acute hypoxemic respiratory failure 2/2 COVID 19 pneumonia

## 2021-01-29 NOTE — CONSULT NOTE ADULT - PROBLEM SELECTOR RECOMMENDATION 5
- continue basal/bolus regimen and titrate to goal serum glucose <180  - monitor glucose levels while on steroids

## 2021-01-29 NOTE — CONSULT NOTE ADULT - SUBJECTIVE AND OBJECTIVE BOX
61 y/o female with PMH of DMII and HTN admitted for sepsis 2/2 covid-19 pneumonia with hypoxemic respiratory failure on 1/25. Covid positive since 1/22. Pt received remdesivir, decadron, and convalescent plasma. Initially started on NC and NRB, currently on HFNC 40 L FiO2 100%. REASON FOR CONSULT: ***    CONSULT REQUESTED BY: ***    Patient is a 60y old  Female who presents with a chief complaint of SOB/COVID (2021 17:54)      BRIEF HOSPITAL COURSE:   61 y/o female with PMH of DMII and HTN admitted for sepsis 2/2 covid-19 pneumonia with hypoxemic respiratory failure on . Covid positive since . Pt received remdesivir, decadron, and convalescent plasma. Initially started on NC and NRB, currently on HFNC 40 L FiO2 100%.    Events last 24 hours:   Desaturated this morning to 82% on %. Upgraded to HFNC 40L 100% SpO2 ~97%  Reports chest pain on left side, worse with inspiration, and reproducible with palpation, improved with morphine    PAST MEDICAL & SURGICAL HISTORY:  Hyperlipidemia    Liver disease  (unable to take tylenol )    Hypertension    Diabetes    Diverticulitis    High cholesterol    HTN (hypertension)    DM (diabetes mellitus)    H/O:       Allergies    Allergy Status Unknown  No Known Allergies    Intolerances      FAMILY HISTORY:  No pertinent family history in first degree relatives    No pertinent family history in first degree relatives        Review of Systems:  CONSTITUTIONAL: No fever, chills, +fatigue  RESPIRATORY: +cough, no wheezing, +shortness of breath  CARDIOVASCULAR: +chest pain, palpitations, no leg swelling  GASTROINTESTINAL: No abdominal or epigastric pain. No nausea, vomiting  MUSCULOSKELETAL: No joint pain or swelling; No muscle, back, or extremity pain    Medications:  remdesivir  IVPB   IV Intermittent   remdesivir  IVPB 100 milliGRAM(s) IV Intermittent every 24 hours    hydrochlorothiazide 12.5 milliGRAM(s) Oral daily  losartan 50 milliGRAM(s) Oral daily    benzonatate 100 milliGRAM(s) Oral every 8 hours PRN    acetaminophen   Tablet .. 650 milliGRAM(s) Oral every 6 hours PRN  morphine  - Injectable 1 milliGRAM(s) IV Push every 6 hours PRN      enoxaparin Injectable 40 milliGRAM(s) SubCutaneous daily    pantoprazole    Tablet 40 milliGRAM(s) Oral before breakfast      atorvastatin 10 milliGRAM(s) Oral at bedtime  dexAMETHasone     Tablet 6 milliGRAM(s) Oral daily  dextrose 40% Gel 15 Gram(s) Oral once  dextrose 50% Injectable 25 Gram(s) IV Push once  dextrose 50% Injectable 25 Gram(s) IV Push once  glucagon  Injectable 1 milliGRAM(s) IntraMuscular once  insulin glargine Injectable (LANTUS) 25 Unit(s) SubCutaneous at bedtime  insulin lispro (ADMELOG) corrective regimen sliding scale   SubCutaneous three times a day before meals  insulin lispro (ADMELOG) corrective regimen sliding scale   SubCutaneous at bedtime  insulin lispro Injectable (ADMELOG) 8 Unit(s) SubCutaneous three times a day before meals    dextrose 5%. 1000 milliLiter(s) IV Continuous <Continuous>  dextrose 5%. 1000 milliLiter(s) IV Continuous <Continuous>  iron sucrose IVPB 200 milliGRAM(s) IV Intermittent every 24 hours      ICU Vital Signs Last 24 Hrs  T(C): 36.9 (2021 16:30), Max: 37.1 (2021 03:23)  T(F): 98.4 (2021 16:30), Max: 98.8 (2021 03:23)  HR: 70 (2021 16:30) (70 - 82)  BP: 119/69 (2021 16:30) (115/65 - 138/83)  BP(mean): --  ABP: --  ABP(mean): --  RR: 18 (2021 16:30) (15 - 18)  SpO2: 92% (2021 16:30) (91% - 97%)    Vital Signs Last 24 Hrs  T(C): 36.9 (2021 16:30), Max: 37.1 (2021 03:23)  T(F): 98.4 (2021 16:30), Max: 98.8 (2021 03:23)  HR: 70 (2021 16:30) (70 - 82)  BP: 119/69 (2021 16:30) (115/65 - 138/83)  BP(mean): --  RR: 18 (2021 16:30) (15 - 18)  SpO2: 92% (2021 16:30) (91% - 97%)    ABG - ( 2021 13:44 )  pH, Arterial: 7.48  pH, Blood: x     /  pCO2: 35    /  pO2: 71    / HCO3: 27    / Base Excess: 3.2   /  SaO2: 95                  I&O's Detail    2021 07:01  -  2021 07:00  --------------------------------------------------------  IN:    IV PiggyBack: 420 mL  Total IN: 420 mL    OUT:    Voided (mL): 920 mL  Total OUT: 920 mL    Total NET: -500 mL            LABS:                        10.6   8.75  )-----------( 234      ( 2021 10:19 )             32.1         x   |  x   |  x   ----------------------------<  x   x    |  x   |  0.57    Ca    8.8      2021 10:19    TPro  6.7  /  Alb  3.3  /  TBili  0.3<L>  /  DBili  0.1  /  AST  36<H>  /  ALT  23  /  AlkPhos  66        CARDIAC MARKERS ( 2021 12:59 )  x     / <0.01 ng/mL / x     / x     / x          CAPILLARY BLOOD GLUCOSE      POCT Blood Glucose.: 225 mg/dL (2021 17:30)      Urinalysis Basic - ( 2021 20:13 )    Color: Yellow / Appearance: Slightly Turbid / S.015 / pH: x  Gluc: x / Ketone: Negative  / Bili: Negative / Urobili: Negative mg/dL   Blood: x / Protein: 30 mg/dL / Nitrite: Negative   Leuk Esterase: Negative / RBC: 0-2 /HPF / WBC 3-5   Sq Epi: x / Non Sq Epi: Occasional / Bacteria: Many      CULTURES:      Physical Examination:    General: Pt on HFNC, uncomfortable / mild distress due to pain.  Alert, oriented, interactive, nonfocal    HEENT: Pupils equal, reactive to light.  Symmetric.    PULM: coarse at the bases bilaterally, no significant sputum production    CVS: Regular rate and rhythm, no murmurs    ABD: Soft, nondistended, nontender, normoactive bowel sounds, no masses    EXT: No edema, nontender    SKIN: Warm and well perfused, no rashes noted.    RADIOLOGY: images reviewed   CRITICAL CARE TIME SPENT:  REASON FOR CONSULT: O2 desaturation     CONSULT REQUESTED BY: Serena Craig     Patient is a 60y old  Female who presents with a chief complaint of SOB/COVID (2021 17:54)      BRIEF HOSPITAL COURSE:   59 y/o female with PMH of DMII and HTN admitted for sepsis 2/2 covid-19 pneumonia with hypoxemic respiratory failure on . Covid positive since . Pt received remdesivir, decadron, and convalescent plasma. Initially started on NC and NRB, currently on HFNC 40 L FiO2 100%.    Events last 24 hours:   Desaturated this morning to 82% on %. Upgraded to HFNC 40L 100% SpO2 ~97%  Reports chest pain on left side, worse with inspiration, and reproducible with palpation, improved with morphine    PAST MEDICAL & SURGICAL HISTORY:  Hyperlipidemia    Liver disease  (unable to take tylenol )    Hypertension    Diabetes    Diverticulitis    High cholesterol    HTN (hypertension)    DM (diabetes mellitus)    H/O:       Allergies    Allergy Status Unknown  No Known Allergies    Intolerances      FAMILY HISTORY:  No pertinent family history in first degree relatives    No pertinent family history in first degree relatives        Review of Systems:  CONSTITUTIONAL: No fever, chills, +fatigue  RESPIRATORY: +cough, no wheezing, +shortness of breath  CARDIOVASCULAR: +chest pain, + palpitations, no leg swelling  GASTROINTESTINAL: No abdominal or epigastric pain. No nausea, vomiting  MUSCULOSKELETAL: No joint pain or swelling; No muscle, back, or extremity pain    Medications:  remdesivir  IVPB   IV Intermittent   remdesivir  IVPB 100 milliGRAM(s) IV Intermittent every 24 hours    hydrochlorothiazide 12.5 milliGRAM(s) Oral daily  losartan 50 milliGRAM(s) Oral daily    benzonatate 100 milliGRAM(s) Oral every 8 hours PRN    acetaminophen   Tablet .. 650 milliGRAM(s) Oral every 6 hours PRN  morphine  - Injectable 1 milliGRAM(s) IV Push every 6 hours PRN      enoxaparin Injectable 40 milliGRAM(s) SubCutaneous daily    pantoprazole    Tablet 40 milliGRAM(s) Oral before breakfast      atorvastatin 10 milliGRAM(s) Oral at bedtime  dexAMETHasone     Tablet 6 milliGRAM(s) Oral daily  dextrose 40% Gel 15 Gram(s) Oral once  dextrose 50% Injectable 25 Gram(s) IV Push once  dextrose 50% Injectable 25 Gram(s) IV Push once  glucagon  Injectable 1 milliGRAM(s) IntraMuscular once  insulin glargine Injectable (LANTUS) 25 Unit(s) SubCutaneous at bedtime  insulin lispro (ADMELOG) corrective regimen sliding scale   SubCutaneous three times a day before meals  insulin lispro (ADMELOG) corrective regimen sliding scale   SubCutaneous at bedtime  insulin lispro Injectable (ADMELOG) 8 Unit(s) SubCutaneous three times a day before meals    dextrose 5%. 1000 milliLiter(s) IV Continuous <Continuous>  dextrose 5%. 1000 milliLiter(s) IV Continuous <Continuous>  iron sucrose IVPB 200 milliGRAM(s) IV Intermittent every 24 hours      ICU Vital Signs Last 24 Hrs  T(C): 36.9 (2021 16:30), Max: 37.1 (2021 03:23)  T(F): 98.4 (2021 16:30), Max: 98.8 (2021 03:23)  HR: 70 (2021 16:30) (70 - 82)  BP: 119/69 (2021 16:30) (115/65 - 138/83)  BP(mean): --  ABP: --  ABP(mean): --  RR: 18 (2021 16:30) (15 - 18)  SpO2: 92% (2021 16:30) (91% - 97%)    Vital Signs Last 24 Hrs  T(C): 36.9 (2021 16:30), Max: 37.1 (2021 03:23)  T(F): 98.4 (2021 16:30), Max: 98.8 (2021 03:23)  HR: 70 (2021 16:30) (70 - 82)  BP: 119/69 (2021 16:30) (115/65 - 138/83)  BP(mean): --  RR: 18 (2021 16:30) (15 - 18)  SpO2: 92% (2021 16:30) (91% - 97%)    ABG - ( 2021 13:44 )  pH, Arterial: 7.48  pH, Blood: x     /  pCO2: 35    /  pO2: 71    / HCO3: 27    / Base Excess: 3.2   /  SaO2: 95                  I&O's Detail    2021 07:01  -  2021 07:00  --------------------------------------------------------  IN:    IV PiggyBack: 420 mL  Total IN: 420 mL    OUT:    Voided (mL): 920 mL  Total OUT: 920 mL    Total NET: -500 mL            LABS:                        10.6   8.75  )-----------( 234      ( 2021 10:19 )             32.1         x   |  x   |  x   ----------------------------<  x   x    |  x   |  0.57    Ca    8.8      2021 10:19    TPro  6.7  /  Alb  3.3  /  TBili  0.3<L>  /  DBili  0.1  /  AST  36<H>  /  ALT  23  /  AlkPhos  66        CARDIAC MARKERS ( 2021 12:59 )  x     / <0.01 ng/mL / x     / x     / x          CAPILLARY BLOOD GLUCOSE      POCT Blood Glucose.: 225 mg/dL (2021 17:30)      Urinalysis Basic - ( 2021 20:13 )    Color: Yellow / Appearance: Slightly Turbid / S.015 / pH: x  Gluc: x / Ketone: Negative  / Bili: Negative / Urobili: Negative mg/dL   Blood: x / Protein: 30 mg/dL / Nitrite: Negative   Leuk Esterase: Negative / RBC: 0-2 /HPF / WBC 3-5   Sq Epi: x / Non Sq Epi: Occasional / Bacteria: Many      CULTURES:      Physical Examination:    General: Pt on HFNC, uncomfortable / mild distress due to pain.  Alert, oriented, interactive, nonfocal    HEENT: Pupils equal, reactive to light.  Symmetric.    PULM: coarse at the bases bilaterally, no significant sputum production    CVS: Regular rate and rhythm, no murmurs    ABD: Soft, nondistended, nontender, normoactive bowel sounds, no masses    EXT: No edema, nontender    SKIN: Warm and well perfused    RADIOLOGY: images reviewed   CRITICAL CARE TIME SPENT:  REASON FOR CONSULT: O2 desaturation     CONSULT REQUESTED BY: Serena Craig     Patient is a 60y old  Female who presents with a chief complaint of SOB/COVID (2021 17:54)      BRIEF HOSPITAL COURSE:   61 y/o female with PMH of DMII and HTN admitted for sepsis 2/2 covid-19 pneumonia with hypoxemic respiratory failure on . Covid positive since . Pt received remdesivir, decadron, and convalescent plasma. Initially started on NC and NRB, currently on HFNC 40 L FiO2 100%.    Events last 24 hours:   Desaturated this morning to 82% on %. Upgraded to HFNC 40L 100% SpO2 ~97%  Reports chest pain on left side, worse with inspiration, and reproducible with palpation, improved with morphine  At this time pt does not require ICU level of care please reconsult if needed.    PAST MEDICAL & SURGICAL HISTORY:  Hyperlipidemia    Liver disease  (unable to take tylenol )    Hypertension    Diabetes    Diverticulitis    High cholesterol    HTN (hypertension)    DM (diabetes mellitus)    H/O:       Allergies    Allergy Status Unknown  No Known Allergies    Intolerances      FAMILY HISTORY:  No pertinent family history in first degree relatives    No pertinent family history in first degree relatives        Review of Systems:  CONSTITUTIONAL: No fever, chills, +fatigue  RESPIRATORY: +cough, no wheezing, +shortness of breath  CARDIOVASCULAR: +chest pain, + palpitations, no leg swelling  GASTROINTESTINAL: No abdominal or epigastric pain. No nausea, vomiting  MUSCULOSKELETAL: No joint pain or swelling; No muscle, back, or extremity pain    Medications:  remdesivir  IVPB   IV Intermittent   remdesivir  IVPB 100 milliGRAM(s) IV Intermittent every 24 hours    hydrochlorothiazide 12.5 milliGRAM(s) Oral daily  losartan 50 milliGRAM(s) Oral daily    benzonatate 100 milliGRAM(s) Oral every 8 hours PRN    acetaminophen   Tablet .. 650 milliGRAM(s) Oral every 6 hours PRN  morphine  - Injectable 1 milliGRAM(s) IV Push every 6 hours PRN      enoxaparin Injectable 40 milliGRAM(s) SubCutaneous daily    pantoprazole    Tablet 40 milliGRAM(s) Oral before breakfast      atorvastatin 10 milliGRAM(s) Oral at bedtime  dexAMETHasone     Tablet 6 milliGRAM(s) Oral daily  dextrose 40% Gel 15 Gram(s) Oral once  dextrose 50% Injectable 25 Gram(s) IV Push once  dextrose 50% Injectable 25 Gram(s) IV Push once  glucagon  Injectable 1 milliGRAM(s) IntraMuscular once  insulin glargine Injectable (LANTUS) 25 Unit(s) SubCutaneous at bedtime  insulin lispro (ADMELOG) corrective regimen sliding scale   SubCutaneous three times a day before meals  insulin lispro (ADMELOG) corrective regimen sliding scale   SubCutaneous at bedtime  insulin lispro Injectable (ADMELOG) 8 Unit(s) SubCutaneous three times a day before meals    dextrose 5%. 1000 milliLiter(s) IV Continuous <Continuous>  dextrose 5%. 1000 milliLiter(s) IV Continuous <Continuous>  iron sucrose IVPB 200 milliGRAM(s) IV Intermittent every 24 hours      ICU Vital Signs Last 24 Hrs  T(C): 36.9 (2021 16:30), Max: 37.1 (2021 03:23)  T(F): 98.4 (2021 16:30), Max: 98.8 (2021 03:23)  HR: 70 (2021 16:30) (70 - 82)  BP: 119/69 (2021 16:30) (115/65 - 138/83)  BP(mean): --  ABP: --  ABP(mean): --  RR: 18 (2021 16:30) (15 - 18)  SpO2: 92% (2021 16:30) (91% - 97%)    Vital Signs Last 24 Hrs  T(C): 36.9 (2021 16:30), Max: 37.1 (2021 03:23)  T(F): 98.4 (2021 16:30), Max: 98.8 (2021 03:23)  HR: 70 (2021 16:30) (70 - 82)  BP: 119/69 (2021 16:30) (115/65 - 138/83)  BP(mean): --  RR: 18 (2021 16:30) (15 - 18)  SpO2: 92% (2021 16:30) (91% - 97%)    ABG - ( 2021 13:44 )  pH, Arterial: 7.48  pH, Blood: x     /  pCO2: 35    /  pO2: 71    / HCO3: 27    / Base Excess: 3.2   /  SaO2: 95                  I&O's Detail    2021 07:01  -  2021 07:00  --------------------------------------------------------  IN:    IV PiggyBack: 420 mL  Total IN: 420 mL    OUT:    Voided (mL): 920 mL  Total OUT: 920 mL    Total NET: -500 mL            LABS:                        10.6   8.75  )-----------( 234      ( 2021 10:19 )             32.1         x   |  x   |  x   ----------------------------<  x   x    |  x   |  0.57    Ca    8.8      2021 10:19    TPro  6.7  /  Alb  3.3  /  TBili  0.3<L>  /  DBili  0.1  /  AST  36<H>  /  ALT  23  /  AlkPhos  66        CARDIAC MARKERS ( 2021 12:59 )  x     / <0.01 ng/mL / x     / x     / x          CAPILLARY BLOOD GLUCOSE      POCT Blood Glucose.: 225 mg/dL (2021 17:30)      Urinalysis Basic - ( 2021 20:13 )    Color: Yellow / Appearance: Slightly Turbid / S.015 / pH: x  Gluc: x / Ketone: Negative  / Bili: Negative / Urobili: Negative mg/dL   Blood: x / Protein: 30 mg/dL / Nitrite: Negative   Leuk Esterase: Negative / RBC: 0-2 /HPF / WBC 3-5   Sq Epi: x / Non Sq Epi: Occasional / Bacteria: Many      CULTURES:      Physical Examination:    General: Pt on HFNC / mild distress due to pain.  Alert, oriented, interactive, nonfocal    HEENT: Pupils equal, reactive to light.  Symmetric.    PULM: coarse at the bases bilaterally, no significant sputum production    CVS: Regular rate and rhythm, no murmurs    ABD: Soft, nondistended, nontender, normoactive bowel sounds, no masses    EXT: No edema, nontender    SKIN: Warm and well perfused    RADIOLOGY: images reviewed   CRITICAL CARE TIME SPENT:

## 2021-01-29 NOTE — PROGRESS NOTE ADULT - ASSESSMENT
59 y/o F w/ a PMH of DMII, HTN came in c/o sob worsening over the past week and associated nausea and NBNB vomiting x 2 days.  Pt was recently diagnosed as an outpt w/ COVID on 1/22 and was told by PCP if her O2 sat went <92 she should go to the hospital.  Pt came to the ED 2 days ago and O2 sat was noted to be >90% and was discharged home.  Pts SOB did not improve and she came back in today.  Pt also reports subjective fevers/chills at home but denies cp, palpitations, abd pain, lower extremity swelling/pain.    COVID 19 + infection  Viral pneumonia  Acute hypoxic respiratory failure      - patient is requiring supplemental O2 now on hiflo  - inflammatory markers are elevated  - D dimer low  - procalcitonin 0.48  - check sputum CX  - Remdesivir 200 mg x 1 and then 100 mg daily x 5 days  - Decadron 6mg IV/PO daily while on Remdesivir  - s/p convalescent plasma 1/29 factsheet provided   - Avoid antibiotics unless there is a concern for a bacterial infection or uptrending procalcitonin  - VTE prophylaxis per current [facility] COVID 19 protocol  - Check CBC with diff, CMP with LFT's daily, Inflammatory markers, D dimer, procalcitonin q48h.   - Encourage self proning q2h, incentive spirometry and ambulation as tolerated  - Taper off O2 as tolerated- once tolerating room air would ideally monitor for 24h prior to discharge.  - Inpatient Contact/Airborne isolation         Will follow

## 2021-01-29 NOTE — PROGRESS NOTE ADULT - SUBJECTIVE AND OBJECTIVE BOX
Chief Complaint:  sob    SUBJECTIVE / OVERNIGHT EVENTS: Pt desaturated overnight and now requiring 100%NRB.  Pt reports sob, banks and is speaking 3-4 word sentences.     Patient denies chest pain, SOB, abd pain, N/V, fever, chills, dysuria or any other complaints. All remainder ROS negative.       I&O's Summary        PHYSICAL EXAM:  Vital Signs Last 24 Hrs  T(C): 36.9 (28 Jan 2021 08:05), Max: 37.1 (27 Jan 2021 15:58)  T(F): 98.5 (28 Jan 2021 08:05), Max: 98.7 (27 Jan 2021 15:58)  HR: 84 (28 Jan 2021 08:05) (80 - 84)  BP: 121/69 (28 Jan 2021 08:05) (108/66 - 136/82)  BP(mean): --  RR: 18 (27 Jan 2021 21:00) (18 - 18)  SpO2: 94% (28 Jan 2021 08:05) (94% - 99%)      CONSTITUTIONAL: pt examined bedside, appears uncomfortable, mild distress, on NRB speaking 3-4 word sentences   HEENT: NC/AT, moist oral mucosa, pale conjunctiva, sclera nonicteric, EOMI  RESPIRATORY: no accessory use but has mild increase resp effort, poor inspiratory effort, scattered rales  CARDIOVASCULAR: RRR, normal S1 and S2, no murmur/rub/gallop  ABDOMEN: soft, NT/ND, normoactive bowel sounds, no rebound/guarding  MUSCLOSKELETAL:  no joint swelling or tenderness to palpation  EXTREMITIES: No cynaosis, no clubbing, no lower extremity edema; Peripheral pulses are 2+ bilaterally  PSYCH: affect appropriate and cooperative  NEUROLOGY: A+O to person, place, and time, no focal neurologic deficits appreciated   SKIN: No rashes or no palpable lesions        LABS:                        10.6   8.75  )-----------( 234      ( 28 Jan 2021 10:19 )             32.1     01-27    135  |  100  |  23.0<H>  ----------------------------<  191<H>  4.1   |  25.0  |  0.67    Ca    9.2      27 Jan 2021 08:56    TPro  7.1  /  Alb  3.6  /  TBili  0.2<L>  /  DBili  0.1  /  AST  33<H>  /  ALT  22  /  AlkPhos  72  01-28              CAPILLARY BLOOD GLUCOSE      POCT Blood Glucose.: 220 mg/dL (28 Jan 2021 08:26)  POCT Blood Glucose.: 212 mg/dL (27 Jan 2021 20:39)  POCT Blood Glucose.: 251 mg/dL (27 Jan 2021 17:05)  POCT Blood Glucose.: 260 mg/dL (27 Jan 2021 12:13)        RADIOLOGY & ADDITIONAL TESTS:          MEDICATIONS  (STANDING):  atorvastatin 10 milliGRAM(s) Oral at bedtime  dexAMETHasone     Tablet 6 milliGRAM(s) Oral daily  dextrose 40% Gel 15 Gram(s) Oral once  dextrose 5%. 1000 milliLiter(s) (50 mL/Hr) IV Continuous <Continuous>  dextrose 5%. 1000 milliLiter(s) (100 mL/Hr) IV Continuous <Continuous>  dextrose 50% Injectable 25 Gram(s) IV Push once  dextrose 50% Injectable 25 Gram(s) IV Push once  enoxaparin Injectable 40 milliGRAM(s) SubCutaneous daily  glucagon  Injectable 1 milliGRAM(s) IntraMuscular once  hydrochlorothiazide 12.5 milliGRAM(s) Oral daily  insulin glargine Injectable (LANTUS) 25 Unit(s) SubCutaneous at bedtime  insulin lispro (ADMELOG) corrective regimen sliding scale   SubCutaneous three times a day before meals  insulin lispro (ADMELOG) corrective regimen sliding scale   SubCutaneous at bedtime  insulin lispro Injectable (ADMELOG) 8 Unit(s) SubCutaneous three times a day before meals  iron sucrose IVPB 200 milliGRAM(s) IV Intermittent every 24 hours  losartan 50 milliGRAM(s) Oral daily  pantoprazole    Tablet 40 milliGRAM(s) Oral before breakfast  remdesivir  IVPB   IV Intermittent   remdesivir  IVPB 100 milliGRAM(s) IV Intermittent every 24 hours    MEDICATIONS  (PRN):  acetaminophen   Tablet .. 650 milliGRAM(s) Oral every 6 hours PRN Temp greater or equal to 38C (100.4F), Mild Pain (1 - 3)  benzonatate 100 milliGRAM(s) Oral every 8 hours PRN Cough                                     Chief Complaint:  sob    SUBJECTIVE / OVERNIGHT EVENTS: Pt desaturated overnight and in AM despite 100%NRB.  Pt placed on high flow, titrated to 100% and saturating mid 80's.  Added NRB to high flow.  Pt unable to speak more than 1 word sentences.  She also reported cp w/ deep inspiration however more left sided than right sided.  Pain reproducible to palpation.         I&O's Summary        PHYSICAL EXAM:  Vital Signs Last 24 Hrs  T(C): 36.2 (29 Jan 2021 07:55), Max: 37.1 (29 Jan 2021 03:23)  T(F): 97.2 (29 Jan 2021 07:55), Max: 98.8 (29 Jan 2021 03:23)  HR: 80 (29 Jan 2021 07:55) (72 - 82)  BP: 132/78 (29 Jan 2021 07:55) (115/65 - 138/83)  BP(mean): --  RR: 18 (29 Jan 2021 07:55) (15 - 18)  SpO2: 92% (29 Jan 2021 08:00) (91% - 97%)      CONSTITUTIONAL: pt examined bedside, appears uncomfortable, mild distress, high flow 100% speaking 1 word sentences   HEENT: NC/AT, moist oral mucosa, pale conjunctiva, sclera nonicteric, EOMI  RESPIRATORY: increased WOB, no use of accessories, poor inspiratory effort, scattered rales  CARDIOVASCULAR: RRR, normal S1 and S2, no murmur/rub/gallop  ABDOMEN: soft, NT/ND, normoactive bowel sounds, no rebound/guarding  MUSCLOSKELETAL:  no joint swelling or tenderness to palpation  EXTREMITIES: No cynaosis, no clubbing, no lower extremity edema; Peripheral pulses are 2+ bilaterally  PSYCH: affect appropriate and cooperative  NEUROLOGY: A+O to person, place, and time, no focal neurologic deficits appreciated   SKIN: No rashes or no palpable lesions        LABS:                                              10.6   8.75  )-----------( 234      ( 28 Jan 2021 10:19 )             32.1       01-29    x   |  x   |  x   ----------------------------<  x   x    |  x   |  0.57    Ca    8.8      28 Jan 2021 10:19    TPro  6.7  /  Alb  3.3  /  TBili  0.3<L>  /  DBili  0.1  /  AST  36<H>  /  ALT  23  /  AlkPhos  66  01-29            CAPILLARY BLOOD GLUCOSE      POCT Blood Glucose.: 220 mg/dL (28 Jan 2021 08:26)  POCT Blood Glucose.: 212 mg/dL (27 Jan 2021 20:39)  POCT Blood Glucose.: 251 mg/dL (27 Jan 2021 17:05)  POCT Blood Glucose.: 260 mg/dL (27 Jan 2021 12:13)        RADIOLOGY & ADDITIONAL TESTS:          MEDICATIONS  (STANDING):  atorvastatin 10 milliGRAM(s) Oral at bedtime  dexAMETHasone     Tablet 6 milliGRAM(s) Oral daily  dextrose 40% Gel 15 Gram(s) Oral once  dextrose 5%. 1000 milliLiter(s) (50 mL/Hr) IV Continuous <Continuous>  dextrose 5%. 1000 milliLiter(s) (100 mL/Hr) IV Continuous <Continuous>  dextrose 50% Injectable 25 Gram(s) IV Push once  dextrose 50% Injectable 25 Gram(s) IV Push once  enoxaparin Injectable 40 milliGRAM(s) SubCutaneous daily  glucagon  Injectable 1 milliGRAM(s) IntraMuscular once  hydrochlorothiazide 12.5 milliGRAM(s) Oral daily  insulin glargine Injectable (LANTUS) 25 Unit(s) SubCutaneous at bedtime  insulin lispro (ADMELOG) corrective regimen sliding scale   SubCutaneous three times a day before meals  insulin lispro (ADMELOG) corrective regimen sliding scale   SubCutaneous at bedtime  insulin lispro Injectable (ADMELOG) 8 Unit(s) SubCutaneous three times a day before meals  iron sucrose IVPB 200 milliGRAM(s) IV Intermittent every 24 hours  losartan 50 milliGRAM(s) Oral daily  pantoprazole    Tablet 40 milliGRAM(s) Oral before breakfast  remdesivir  IVPB   IV Intermittent   remdesivir  IVPB 100 milliGRAM(s) IV Intermittent every 24 hours    MEDICATIONS  (PRN):  acetaminophen   Tablet .. 650 milliGRAM(s) Oral every 6 hours PRN Temp greater or equal to 38C (100.4F), Mild Pain (1 - 3)  benzonatate 100 milliGRAM(s) Oral every 8 hours PRN Cough

## 2021-01-30 NOTE — DIETITIAN INITIAL EVALUATION ADULT. - PERTINENT LABORATORY DATA
01-30 Na138 mmol/L Glu 120 mg/dL<H> K+ 3.7 mmol/L Cr  0.62 mg/dL BUN 28.0 mg/dL<H> Phos n/a   Alb 3.3 g/dL PAB n/a

## 2021-01-30 NOTE — PROGRESS NOTE ADULT - SUBJECTIVE AND OBJECTIVE BOX
CC: Follow up    INTERVAL HPI/OVERNIGHT EVENTS: Patient seen and examined, sitting up in a chair on [facility] NC. COmplaints of central chest pain worse with movement    Vital Signs Last 24 Hrs  T(C): 36.9 (2021 08:00), Max: 36.9 (2021 16:30)  T(F): 98.4 (2021 08:00), Max: 98.4 (2021 16:30)  HR: 67 (2021 08:07) (67 - 74)  BP: 113/61 (2021 08:00) (102/61 - 119/69)  BP(mean): --  RR: 16 (2021 08:00) (14 - 18)  SpO2: 100% (2021 08:07) (90% - 100%)    PHYSICAL EXAM:    GENERAL: NAD AOX3  HEAD:  Atraumatic, Normocephalic  EYES: conjunctiva and sclera clear  ENMT: Moist mucous membranes  NECK: Supple, No JVD  CHEST/LUNG: Clear to auscultation bilaterally; No rales, rhonchi, wheezing, or rubs  Chest wall tenderness  HEART: Regular rate and rhythm; No murmurs, rubs, or gallops  ABDOMEN: Soft, Nontender, Nondistended; Bowel sounds present  EXTREMITIES:  2+ Peripheral Pulses, No clubbing, cyanosis, or edema        MEDICATIONS  (STANDING):  atorvastatin 10 milliGRAM(s) Oral at bedtime  dexAMETHasone     Tablet 6 milliGRAM(s) Oral daily  dextrose 40% Gel 15 Gram(s) Oral once  dextrose 5%. 1000 milliLiter(s) (50 mL/Hr) IV Continuous <Continuous>  dextrose 5%. 1000 milliLiter(s) (100 mL/Hr) IV Continuous <Continuous>  dextrose 50% Injectable 25 Gram(s) IV Push once  dextrose 50% Injectable 25 Gram(s) IV Push once  enoxaparin Injectable 40 milliGRAM(s) SubCutaneous daily  ferrous    sulfate 325 milliGRAM(s) Oral daily  glucagon  Injectable 1 milliGRAM(s) IntraMuscular once  hydrochlorothiazide 12.5 milliGRAM(s) Oral daily  insulin glargine Injectable (LANTUS) 25 Unit(s) SubCutaneous at bedtime  insulin lispro (ADMELOG) corrective regimen sliding scale   SubCutaneous at bedtime  insulin lispro (ADMELOG) corrective regimen sliding scale   SubCutaneous three times a day before meals  insulin lispro Injectable (ADMELOG) 8 Unit(s) SubCutaneous three times a day before meals  losartan 50 milliGRAM(s) Oral daily  pantoprazole    Tablet 40 milliGRAM(s) Oral before breakfast  remdesivir  IVPB   IV Intermittent   remdesivir  IVPB 100 milliGRAM(s) IV Intermittent every 24 hours    MEDICATIONS  (PRN):  acetaminophen   Tablet .. 650 milliGRAM(s) Oral every 6 hours PRN Temp greater or equal to 38C (100.4F), Mild Pain (1 - 3)  benzonatate 100 milliGRAM(s) Oral every 8 hours PRN Cough  morphine  - Injectable 1 milliGRAM(s) IV Push every 6 hours PRN Severe Pain (7 - 10)      Allergies    Allergy Status Unknown  No Known Allergies    Intolerances          LABS:                          10.9   12.08 )-----------( 287      ( 2021 06:36 )             33.1     -    138  |  98  |  28.0<H>  ----------------------------<  120<H>  3.7   |  27.0  |  0.62    Ca    8.9      2021 06:36    TPro  6.7  /  Alb  3.3  /  TBili  0.4  /  DBili  0.1  /  AST  36<H>  /  ALT  22  /  AlkPhos  70  -30      Urinalysis Basic - ( 2021 20:13 )    Color: Yellow / Appearance: Slightly Turbid / S.015 / pH: x  Gluc: x / Ketone: Negative  / Bili: Negative / Urobili: Negative mg/dL   Blood: x / Protein: 30 mg/dL / Nitrite: Negative   Leuk Esterase: Negative / RBC: 0-2 /HPF / WBC 3-5   Sq Epi: x / Non Sq Epi: Occasional / Bacteria: Many        RADIOLOGY & ADDITIONAL TESTS:

## 2021-01-30 NOTE — CHART NOTE - NSCHARTNOTEFT_GEN_A_CORE
Called by RN to evaluate patient for C/O low back pain.      The patient is a 60 year old female with a past medical history of diabetes mellitus type 2 and hypertension who presented to the ED with vomiting and fever. Diagnosed with COVID on 1/22; Admitted for acute hypoxic respiratory failure secondary to COVID pneumonia. CTA of the chest and lower extremity duplex were negative.    Vital Signs Last 24 Hrs  T(C): 36.9 (30 Jan 2021 08:00), Max: 36.9 (29 Jan 2021 16:30)  T(F): 98.4 (30 Jan 2021 08:00), Max: 98.4 (29 Jan 2021 16:30)  HR: 67 (30 Jan 2021 08:07) (67 - 74)  BP: 113/61 (30 Jan 2021 08:00) (102/61 - 119/69)  RR: 16 (30 Jan 2021 08:00) (14 - 18)  SpO2: 100% (30 Jan 2021 08:07) (90% - 100%)      Physical Exam  CONSTITUTIONAL: pt examined bedside, appears uncomfortable, mild distress, high flow 100% speaking 1 word sentences   HEENT: NC/AT, moist oral mucosa, pale conjunctiva, sclera nonicteric, EOMI  RESPIRATORY: increased WOB, no use of accessories, poor inspiratory effort, scattered rales  CARDIOVASCULAR: RRR, normal S1 and S2, no murmur/rub/gallop  ABDOMEN: soft, NT/ND, normoactive bowel sounds, no rebound/guarding  MUSCLOSKELETAL:  no joint swelling or tenderness to palpation. lumbar pain with movement and certain positions  EXTREMITIES: No cynaosis, no clubbing, no lower extremity edema; Peripheral pulses are 2+ bilaterally  PSYCH: affect appropriate and cooperative  NEUROLOGY: A+O to person, place, and time, no focal neurologic deficits appreciated   SKIN: No rashes or no palpable lesions      Assessment/  Plan    58 yo female admitted with COVID PNA on HFO2, O2 sat 93%, in mild respiratory distress, with increased WOB. She reports low back pain, reproducible and increased with certain movements.  Patient is sitting in chair X 1 hour.   -Tylenol 650 mg po now (has prn order)  -change position, warm pack to lower back  -Reevaluate for relief, if no relief consider prn dose Morphine

## 2021-01-30 NOTE — PROGRESS NOTE ADULT - ASSESSMENT
The patient is a 60 year old female with a past medical history of diabetes mellitus type 2 and hypertension who presented to the Er with vomiting and fever. Diagnosed with COVID on 1/22; Admitted for acute hypoxic respiratory failure secondary to COVID pneumonia. CTA of the chest and lower extremity duplex were negative.    Assessment/Plan:    1. Acute hypoxic respiratory failure secondary to COVID pneumonia: On hiflo NC  Day 5 of IV remdesivir and decadron  Status post covalescent plasma on 1/28  Encourage prone ventilation and incentive spirometry    2. Sepsis on admission secondary to COVID pneumonia: Now resolved    3. Atypical chest paiN: CTA ruled out PE  EKG reviewed with no acute changes  Reproducible on examination    4. Normocytic anemia: On IV venofer    5. Diabetes mellitus type 2:   Hyperglycemia exacerbated by steroid use   Uncontrolled DM w/ A1c 8.3    6. Hypertension;    VTE: Lovenox subcut      The patient is a 60 year old female with a past medical history of diabetes mellitus type 2 and hypertension who presented to the Er with vomiting and fever. Diagnosed with COVID on 1/22; Admitted for acute hypoxic respiratory failure secondary to COVID pneumonia. CTA of the chest and lower extremity duplex were negative.    Assessment/Plan:    1. Acute hypoxic respiratory failure secondary to COVID pneumonia: On hiflo NC  Day 5 of IV remdesivir and decadron  Status post covalescent plasma on 1/28  Encourage prone ventilation and incentive spirometry    2. Sepsis on admission secondary to COVID pneumonia: Now resolved    3. Atypical chest paiN: CTA ruled out PE  EKG reviewed with no acute changes  Reproducible on examination    4. Normocytic anemia: On IV venofer    5. Diabetes mellitus type 2:   Hyperglycemia exacerbated by steroid use   Uncontrolled DM w/ A1c 8.3    6. Hypertension; BP controlled  On HCtz and losartan    VTE: Lovenox subcut

## 2021-01-30 NOTE — DIETITIAN INITIAL EVALUATION ADULT. - OTHER INFO
59 y/o F w/ a PMH of DMII (Hgb A1c 8.3%), HTN came in c/o sob worsening over the past week and associated N/V and subjective fevers x 2 days.  Pt had increased SOB and was speaking 4-5 word sentences, admitted for sepsis 2/2 COVID PNA. Pt now on NRB + HFNC. Pt with varied PO intake inhouse, initially poor then seemingly improving. None documented recently though suspect likely suboptimal given need for NRB + HFNC. RD to remain available for nutrition education as feasible.

## 2021-01-31 NOTE — PROGRESS NOTE ADULT - SUBJECTIVE AND OBJECTIVE BOX
CC: Follow up    INTERVAL HPI/OVERNIGHT EVENTS: Patient seen and examined, sitting up in a chair on Hif[place] NC. Denies sob, chest pain or cough. Afebrile.       Vital Signs Last 24 Hrs  T(C): 36.9 (31 Jan 2021 08:03), Max: 36.9 (31 Jan 2021 08:03)  T(F): 98.4 (31 Jan 2021 08:03), Max: 98.4 (31 Jan 2021 08:03)  HR: 86 (31 Jan 2021 08:03) (64 - 88)  BP: 109/69 (31 Jan 2021 08:03) (102/60 - 117/72)  BP(mean): --  RR: 18 (31 Jan 2021 05:00) (18 - 18)  SpO2: 94% (31 Jan 2021 08:03) (90% - 98%)    PHYSICAL EXAM:    GENERAL: NAD, AOX3  HEAD:  Atraumatic, Normocephalic  EYES: EOMI, PERRLA, conjunctiva and sclera clear  ENMT: Moist mucous membranes  NECK: Supple  CHEST/LUNG: Clear to auscultation bilaterally; No rales, rhonchi, wheezing, or rubs  HEART: Regular rate and rhythm; No murmurs, rubs, or gallops  ABDOMEN: Soft, Nontender, Nondistended; Bowel sounds present  EXTREMITIES:  2+ Peripheral Pulses, No clubbing, cyanosis, or edema        MEDICATIONS  (STANDING):  atorvastatin 10 milliGRAM(s) Oral at bedtime  dexAMETHasone     Tablet 6 milliGRAM(s) Oral daily  dextrose 40% Gel 15 Gram(s) Oral once  dextrose 5%. 1000 milliLiter(s) (50 mL/Hr) IV Continuous <Continuous>  dextrose 5%. 1000 milliLiter(s) (100 mL/Hr) IV Continuous <Continuous>  dextrose 50% Injectable 25 Gram(s) IV Push once  dextrose 50% Injectable 25 Gram(s) IV Push once  enoxaparin Injectable 40 milliGRAM(s) SubCutaneous daily  ferrous    sulfate 325 milliGRAM(s) Oral daily  glucagon  Injectable 1 milliGRAM(s) IntraMuscular once  hydrochlorothiazide 12.5 milliGRAM(s) Oral daily  insulin glargine Injectable (LANTUS) 25 Unit(s) SubCutaneous at bedtime  insulin lispro (ADMELOG) corrective regimen sliding scale   SubCutaneous three times a day before meals  insulin lispro (ADMELOG) corrective regimen sliding scale   SubCutaneous at bedtime  insulin lispro Injectable (ADMELOG) 8 Unit(s) SubCutaneous three times a day before meals  losartan 50 milliGRAM(s) Oral daily  pantoprazole    Tablet 40 milliGRAM(s) Oral before breakfast    MEDICATIONS  (PRN):  acetaminophen   Tablet .. 650 milliGRAM(s) Oral every 6 hours PRN Temp greater or equal to 38C (100.4F), Mild Pain (1 - 3)  aluminum hydroxide/magnesium hydroxide/simethicone Suspension 30 milliLiter(s) Oral every 6 hours PRN Dyspepsia  benzonatate 100 milliGRAM(s) Oral every 8 hours PRN Cough  morphine  - Injectable 1 milliGRAM(s) IV Push every 6 hours PRN Severe Pain (7 - 10)      Allergies    Allergy Status Unknown  No Known Allergies    Intolerances          LABS:                          11.0   14.54 )-----------( 289      ( 31 Jan 2021 07:19 )             32.7     01-31    135  |  97<L>  |  28.0<H>  ----------------------------<  139<H>  4.2   |  25.0  |  0.58    Ca    8.6      31 Jan 2021 07:19    TPro  6.5<L>  /  Alb  3.1<L>  /  TBili  0.5  /  DBili  0.1  /  AST  32<H>  /  ALT  22  /  AlkPhos  66  01-31          RADIOLOGY & ADDITIONAL TESTS:

## 2021-01-31 NOTE — PROGRESS NOTE ADULT - ASSESSMENT
The patient is a 60 year old female with a past medical history of diabetes mellitus type 2 and hypertension who presented to the Er with vomiting and fever. Diagnosed with COVID on 1/22; Admitted for acute hypoxic respiratory failure secondary to COVID pneumonia. CTA of the chest and lower extremity duplex were negative.    Assessment/Plan:    1. Acute hypoxic respiratory failure secondary to COVID pneumonia: On hiflo NC  Day 6 of IV remdesivir and decadron  Status post convalescent plasma on 1/28  Encourage prone ventilation and incentive spirometry    2. Sepsis on admission secondary to COVID pneumonia: Now resolved    3. Atypical chest paiN: CTA ruled out PE  EKG reviewed with no acute changes  Reproducible on examination    4. Normocytic anemia: PO ferrous sulfate OD     5. Diabetes mellitus type 2:   Hyperglycemia exacerbated by steroid use   Uncontrolled DM w/ A1c 8.3    6. Hypertension; BP controlled  On HCtz and losartan    VTE: Lovenox subcut

## 2021-02-01 NOTE — PROGRESS NOTE ADULT - SUBJECTIVE AND OBJECTIVE BOX
[facility]  INFECTIOUS DISEASES AND INTERNAL MEDICINE at [place]  =======================================================  Miguel Aponte MD  Diplomates American Board of Internal Medicine and Infectious Diseases  Tel: [phone]      Fax: [phone]  =======================================================    MAYA LOTT [id number]    Follow up: covid19  remains on hi terese  per chart reported back pain o/n      Allergies:  Allergy Status Unknown  No Known Allergies           REVIEW OF SYSTEMS:  CONSTITUTIONAL:  No Fever or chills  HEENT:   No diplopia or blurred vision.  No earache, sore throat or runny nose.  CARDIOVASCULAR:  No pressure, squeezing, strangling, tightness, heaviness or aching about the chest, neck, axilla or epigastrium.  RESPIRATORY:  No cough, shortness of breath  GASTROINTESTINAL:  No nausea, vomiting or diarrhea.  GENITOURINARY:  No dysuria, frequency or urgency. No Blood in urine  MUSCULOSKELETAL:  no joint aches, no muscle pain  SKIN:  No change in skin, hair or nails.  NEUROLOGIC:  No Headaches, seizures or weakness.  PSYCHIATRIC:  No disorder of thought or mood.  ENDOCRINE:  No heat or cold intolerance  HEMATOLOGICAL:  No easy bruising or bleeding.       Physical Exam:  GEN: NAD, pleasant  HEENT: normocephalic and atraumatic. EOMI. PERRL.  Anicteric   NECK: Supple.   LUNGS: Clear to auscultation.  HEART: Regular rate and rhythm without murmur.  ABDOMEN: Soft, nontender, and nondistended.  Positive bowel sounds.    : No CVA tenderness  EXTREMITIES: Without any edema.  MSK: no joint swelling  NEUROLOGIC: Cranial nerves II through XII are grossly intact. No focal deficits  PSYCHIATRIC: Appropriate affect .  SKIN: No Rash      Vitals:    T(F): 98.5 (01 Feb 2021 07:58), Max: 98.8 (31 Jan 2021 16:09)  HR: 88 (01 Feb 2021 10:38)  BP: 115/70 (01 Feb 2021 07:58)  RR: 19 (01 Feb 2021 05:03)  SpO2: 92% (01 Feb 2021 10:38) (90% - 95%)  temp max in last 48H T(F): , Max: 98.8 (01-31-21 @ 16:09)    Current Antibiotics:  remdesivir  IVPB 100 milliGRAM(s) IV Intermittent every 24 hours    Other medications:  atorvastatin 10 milliGRAM(s) Oral at bedtime  dexAMETHasone     Tablet 6 milliGRAM(s) Oral daily  dextrose 40% Gel 15 Gram(s) Oral once  dextrose 5%. 1000 milliLiter(s) IV Continuous <Continuous>  dextrose 5%. 1000 milliLiter(s) IV Continuous <Continuous>  dextrose 50% Injectable 25 Gram(s) IV Push once  dextrose 50% Injectable 25 Gram(s) IV Push once  enoxaparin Injectable 40 milliGRAM(s) SubCutaneous daily  ferrous    sulfate 325 milliGRAM(s) Oral daily  glucagon  Injectable 1 milliGRAM(s) IntraMuscular once  hydrochlorothiazide 12.5 milliGRAM(s) Oral daily  insulin glargine Injectable (LANTUS) 25 Unit(s) SubCutaneous at bedtime  insulin lispro (ADMELOG) corrective regimen sliding scale   SubCutaneous three times a day before meals  insulin lispro (ADMELOG) corrective regimen sliding scale   SubCutaneous at bedtime  insulin lispro Injectable (ADMELOG) 8 Unit(s) SubCutaneous three times a day before meals  losartan 50 milliGRAM(s) Oral daily  pantoprazole    Tablet 40 milliGRAM(s) Oral before breakfast                            11.8   17.15 )-----------( 293      ( 01 Feb 2021 09:19 )             34.4     02-01    x   |  x   |  x   ----------------------------<  x   x    |  x   |  0.89    Ca    9.0      01 Feb 2021 09:19    TPro  6.6  /  Alb  3.1<L>  /  TBili  0.5  /  DBili  0.2  /  AST  31  /  ALT  21  /  AlkPhos  74  02-01    RECENT CULTURES:  01-25 @ 18:35          NotDete      WBC Count: 17.15 K/uL (02-01-21 @ 09:19)  WBC Count: 14.54 K/uL (01-31-21 @ 07:19)  WBC Count: 12.08 K/uL (01-30-21 @ 06:36)  WBC Count: 8.75 K/uL (01-28-21 @ 10:19)    Creatinine, Serum: 0.89 mg/dL (02-01-21 @ 13:15)  Creatinine, Serum: 0.73 mg/dL (02-01-21 @ 09:19)  Creatinine, Serum: 0.58 mg/dL (01-31-21 @ 07:19)  Creatinine, Serum: 0.62 mg/dL (01-30-21 @ 06:36)  Creatinine, Serum: 0.66 mg/dL (01-29-21 @ 12:59)  Creatinine, Serum: 0.57 mg/dL (01-29-21 @ 06:11)  Creatinine, Serum: 0.69 mg/dL (01-28-21 @ 10:19)    C-Reactive Protein, Serum: 5.83 mg/dL (01-27-21 @ 21:55)  C-Reactive Protein, Serum: 6.86 mg/dL (01-25-21 @ 16:22)    Ferritin, Serum: 915 ng/mL (01-27-21 @ 21:55)  Ferritin, Serum: 356 ng/mL (01-25-21 @ 16:22)      Procalcitonin, Serum: 0.21 ng/mL (01-29-21 @ 06:11)  Procalcitonin, Serum: 0.48 ng/mL (01-27-21 @ 21:55)  Procalcitonin, Serum: 0.67 ng/mL (01-25-21 @ 16:22)     Rapid RVP Result: NotDetec (01-25-21 @ 18:35)  SARS-CoV-2: Detected (01-25-21 @ 18:35)       [facility]  INFECTIOUS DISEASES AND INTERNAL MEDICINE at [place]  =======================================================  Miguel Aponte MD  Diplomates American Board of Internal Medicine and Infectious Diseases  Tel: [phone]      Fax: [phone]  =======================================================    MAYA LOTT [id number]    Follow up: covid19  remains on hi terese  per chart reported back pain o/n      Allergies:  Allergy Status Unknown  No Known Allergies           REVIEW OF SYSTEMS:  CONSTITUTIONAL:  No Fever or chills  HEENT:   No diplopia or blurred vision.  No earache, sore throat or runny nose.  CARDIOVASCULAR:  No pressure, squeezing, strangling, tightness, heaviness or aching about the chest, neck, axilla or epigastrium.  RESPIRATORY:  No cough, shortness of breath  GASTROINTESTINAL:  No nausea, vomiting or diarrhea.  GENITOURINARY:  No dysuria, frequency or urgency. No Blood in urine  MUSCULOSKELETAL:  no joint aches, no muscle pain  SKIN:  No change in skin, hair or nails.  NEUROLOGIC:  No Headaches, seizures or weakness.  PSYCHIATRIC:  No disorder of thought or mood.  ENDOCRINE:  No heat or cold intolerance  HEMATOLOGICAL:  No easy bruising or bleeding.           Vitals:    T(F): 98.5 (01 Feb 2021 07:58), Max: 98.8 (31 Jan 2021 16:09)  HR: 88 (01 Feb 2021 10:38)  BP: 115/70 (01 Feb 2021 07:58)  RR: 19 (01 Feb 2021 05:03)  SpO2: 92% (01 Feb 2021 10:38) (90% - 95%)  temp max in last 48H T(F): , Max: 98.8 (01-31-21 @ 16:09)    Current Antibiotics:  remdesivir  IVPB 100 milliGRAM(s) IV Intermittent every 24 hours    Other medications:  atorvastatin 10 milliGRAM(s) Oral at bedtime  dexAMETHasone     Tablet 6 milliGRAM(s) Oral daily  dextrose 40% Gel 15 Gram(s) Oral once  dextrose 5%. 1000 milliLiter(s) IV Continuous <Continuous>  dextrose 5%. 1000 milliLiter(s) IV Continuous <Continuous>  dextrose 50% Injectable 25 Gram(s) IV Push once  dextrose 50% Injectable 25 Gram(s) IV Push once  enoxaparin Injectable 40 milliGRAM(s) SubCutaneous daily  ferrous    sulfate 325 milliGRAM(s) Oral daily  glucagon  Injectable 1 milliGRAM(s) IntraMuscular once  hydrochlorothiazide 12.5 milliGRAM(s) Oral daily  insulin glargine Injectable (LANTUS) 25 Unit(s) SubCutaneous at bedtime  insulin lispro (ADMELOG) corrective regimen sliding scale   SubCutaneous three times a day before meals  insulin lispro (ADMELOG) corrective regimen sliding scale   SubCutaneous at bedtime  insulin lispro Injectable (ADMELOG) 8 Unit(s) SubCutaneous three times a day before meals  losartan 50 milliGRAM(s) Oral daily  pantoprazole    Tablet 40 milliGRAM(s) Oral before breakfast                            11.8   17.15 )-----------( 293      ( 01 Feb 2021 09:19 )             34.4     02-01    x   |  x   |  x   ----------------------------<  x   x    |  x   |  0.89    Ca    9.0      01 Feb 2021 09:19    TPro  6.6  /  Alb  3.1<L>  /  TBili  0.5  /  DBili  0.2  /  AST  31  /  ALT  21  /  AlkPhos  74  02-01    RECENT CULTURES:  01-25 @ 18:35          [facility]      WBC Count: 17.15 K/uL (02-01-21 @ 09:19)  WBC Count: 14.54 K/uL (01-31-21 @ 07:19)  WBC Count: 12.08 K/uL (01-30-21 @ 06:36)  WBC Count: 8.75 K/uL (01-28-21 @ 10:19)    Creatinine, Serum: 0.89 mg/dL (02-01-21 @ 13:15)  Creatinine, Serum: 0.73 mg/dL (02-01-21 @ 09:19)  Creatinine, Serum: 0.58 mg/dL (01-31-21 @ 07:19)  Creatinine, Serum: 0.62 mg/dL (01-30-21 @ 06:36)  Creatinine, Serum: 0.66 mg/dL (01-29-21 @ 12:59)  Creatinine, Serum: 0.57 mg/dL (01-29-21 @ 06:11)  Creatinine, Serum: 0.69 mg/dL (01-28-21 @ 10:19)    C-Reactive Protein, Serum: 5.83 mg/dL (01-27-21 @ 21:55)  C-Reactive Protein, Serum: 6.86 mg/dL (01-25-21 @ 16:22)    Ferritin, Serum: 915 ng/mL (01-27-21 @ 21:55)  Ferritin, Serum: 356 ng/mL (01-25-21 @ 16:22)      Procalcitonin, Serum: 0.21 ng/mL (01-29-21 @ 06:11)  Procalcitonin, Serum: 0.48 ng/mL (01-27-21 @ 21:55)  Procalcitonin, Serum: 0.67 ng/mL (01-25-21 @ 16:22)     Rapid RVP Result: NotDetec (01-25-21 @ 18:35)  SARS-CoV-2: Detected (01-25-21 @ 18:35)

## 2021-02-01 NOTE — PROGRESS NOTE ADULT - SUBJECTIVE AND OBJECTIVE BOX
CC: Follow up    INTERVAL HPI/OVERNIGHT EVENTS: Patient seen and examined, currently on Hiflo nasal cannula. Complaints of left sided chest pain radiating to the back worse with movement and inspiration       Vital Signs Last 24 Hrs  T(C): 36.9 (01 Feb 2021 07:58), Max: 37.1 (31 Jan 2021 16:09)  T(F): 98.5 (01 Feb 2021 07:58), Max: 98.8 (31 Jan 2021 16:09)  HR: 88 (01 Feb 2021 10:38) (66 - 88)  BP: 115/70 (01 Feb 2021 07:58) (103/55 - 127/75)  BP(mean): --  RR: 19 (01 Feb 2021 05:03) (18 - 19)  SpO2: 92% (01 Feb 2021 10:38) (90% - 95%)    PHYSICAL EXAM:    GENERAL: NAD, AOX3  HEAD:  Atraumatic, Normocephalic  EYES:  conjunctiva and sclera clear  ENMT: Moist mucous membranes  NECK: Supple, No JVD  CHEST/LUNG: Clear to auscultation bilaterally; No rales, rhonchi, wheezing, or rubs  Anterior chest wall tenderness  HEART: Regular rate and rhythm; No murmurs, rubs, or gallops  ABDOMEN: Soft, Nontender, Nondistended; Bowel sounds present  EXTREMITIES:  2+ Peripheral Pulses, No clubbing, cyanosis, or edema        MEDICATIONS  (STANDING):  atorvastatin 10 milliGRAM(s) Oral at bedtime  dexAMETHasone     Tablet 6 milliGRAM(s) Oral daily  dextrose 40% Gel 15 Gram(s) Oral once  dextrose 5%. 1000 milliLiter(s) (50 mL/Hr) IV Continuous <Continuous>  dextrose 5%. 1000 milliLiter(s) (100 mL/Hr) IV Continuous <Continuous>  dextrose 50% Injectable 25 Gram(s) IV Push once  dextrose 50% Injectable 25 Gram(s) IV Push once  enoxaparin Injectable 40 milliGRAM(s) SubCutaneous daily  ferrous    sulfate 325 milliGRAM(s) Oral daily  glucagon  Injectable 1 milliGRAM(s) IntraMuscular once  hydrochlorothiazide 12.5 milliGRAM(s) Oral daily  insulin glargine Injectable (LANTUS) 25 Unit(s) SubCutaneous at bedtime  insulin lispro (ADMELOG) corrective regimen sliding scale   SubCutaneous three times a day before meals  insulin lispro (ADMELOG) corrective regimen sliding scale   SubCutaneous at bedtime  insulin lispro Injectable (ADMELOG) 8 Unit(s) SubCutaneous three times a day before meals  losartan 50 milliGRAM(s) Oral daily  pantoprazole    Tablet 40 milliGRAM(s) Oral before breakfast  remdesivir  IVPB 100 milliGRAM(s) IV Intermittent every 24 hours    MEDICATIONS  (PRN):  acetaminophen   Tablet .. 650 milliGRAM(s) Oral every 6 hours PRN Temp greater or equal to 38C (100.4F), Mild Pain (1 - 3)  aluminum hydroxide/magnesium hydroxide/simethicone Suspension 30 milliLiter(s) Oral every 6 hours PRN Dyspepsia  benzonatate 100 milliGRAM(s) Oral every 8 hours PRN Cough  morphine  - Injectable 1 milliGRAM(s) IV Push every 6 hours PRN Severe Pain (7 - 10)      Allergies    Allergy Status Unknown  No Known Allergies    Intolerances          LABS:                          11.8   17.15 )-----------( 293      ( 01 Feb 2021 09:19 )             34.4     02-01    132<L>  |  94<L>  |  29.0<H>  ----------------------------<  232<H>  4.6   |  24.0  |  0.73    Ca    9.0      01 Feb 2021 09:19    TPro  7.0  /  Alb  3.2<L>  /  TBili  0.6  /  DBili  0.2  /  AST  36<H>  /  ALT  22  /  AlkPhos  84  02-01          RADIOLOGY & ADDITIONAL TESTS:

## 2021-02-01 NOTE — CHART NOTE - NSCHARTNOTEFT_GEN_A_CORE
Pt c/o lower back discomfort   Pt evaluated 1/30  Pt states back pain is the same   When going to evaluate pt, pt was sleeping X2  Now pt c/o low back pain that hurts with movement and upon palpation  VSS B/P 127/75 P 84 R 18 PO 92% on HFNC   Pt lying prone, NAD , breathing easy and unlabored  Pt does not appear uncomfortable  Lungs clear   abd soft and nontender  Flank no flank tenderness  Back + tenderness lower back paraspinal, nontender on spine  Back tenderness reproducible with movement of back and arms  VSS NAD reproducible back discomfort  Discussed with Dr Vieyra  Lidocaine patch  recommend imaging in am if pain persists and pt is stable enough to leave floor  Continue to monitor

## 2021-02-01 NOTE — PROGRESS NOTE ADULT - ASSESSMENT
61 y/o F w/ a PMH of DMII, HTN came in c/o sob worsening over the past week and associated nausea and NBNB vomiting x 2 days.  Pt was recently diagnosed as an outpt w/ COVID on 1/22 and was told by PCP if her O2 sat went <92 she should go to the hospital.  Pt came to the ED 2 days ago and O2 sat was noted to be >90% and was discharged home.  Pts SOB did not improve and she came back in today.  Pt also reports subjective fevers/chills at home but denies cp, palpitations, abd pain, lower extremity swelling/pain.    COVID 19 + infection  Viral pneumonia  Acute hypoxic respiratory failure      - patient remains  on hiflo  - inflammatory markers are elevated  - D dimer low  - procalcitonin 0.48  - WBC elevated; likely reactive  - Remdesivir 200 mg x 1 and then 100 mg daily-extended for 10 day course  - Decadron 6mg IV/PO daily while on Remdesivir  - s/p convalescent plasma 1/29 factsheet provided   - Avoid antibiotics unless there is a concern for a bacterial infection or uptrending procalcitonin  - VTE prophylaxis per current [facility] COVID 19 protocol  - Check CBC with diff, CMP with LFT's daily, Inflammatory markers, D dimer, procalcitonin q48h.   - Encourage self proning q2h, incentive spirometry and ambulation as tolerated  - Taper off O2 as tolerated- once tolerating room air would ideally monitor for 24h prior to discharge.  - Inpatient Contact/Airborne isolation       prognosis guarded  d/w Dr Melgoza

## 2021-02-01 NOTE — PROGRESS NOTE ADULT - ASSESSMENT
he patient is a 60 year old female with a past medical history of diabetes mellitus type 2 and hypertension who presented to the Er with vomiting and fever. Diagnosed with COVID on 1/22; Admitted for acute hypoxic respiratory failure secondary to COVID pneumonia. CTA of the chest and lower extremity duplex were negative.    Assessment/Plan:    1. Acute hypoxic respiratory failure secondary to COVID pneumonia: On hiflo NC  Day 6 of IV remdesivir and decadron  Status post convalescent plasma on 1/28  Encourage prone ventilation and incentive spirometry    2. Sepsis on admission secondary to COVID pneumonia: Now resolved    3. Atypical chest paiN: CTA ruled out PE  Repeat EKG and stat troponin given continued pain  Stat chest xray  Reproducible on examination    4. Normocytic anemia: PO ferrous sulfate OD     5. Diabetes mellitus type 2:   Hyperglycemia exacerbated by steroid use   Uncontrolled DM w/ A1c 8.3    6. Hypertension; BP controlled  On HCtz and losartan    VTE: Lovenox subcut      he patient is a 60 year old female with a past medical history of diabetes mellitus type 2 and hypertension who presented to the Er with vomiting and fever. Diagnosed with COVID on 1/22; Admitted for acute hypoxic respiratory failure secondary to COVID pneumonia. CTA of the chest and lower extremity duplex were negative.    Assessment/Plan:    1. Acute hypoxic respiratory failure secondary to COVID pneumonia: On hiflo NC  Day 6 of IV remdesivir and decadron  Status post convalescent plasma on 1/28  Encourage prone ventilation and incentive spirometry    2. Sepsis on admission secondary to COVID pneumonia: Now resolved    3. Atypical chest paiN: CTA ruled out PE  Repeat EKG and stat troponin given continued pain  Stat chest xray  Reproducible on examination    4. Normocytic anemia: PO ferrous sulfate OD     5. Diabetes mellitus type 2:   Hyperglycemia exacerbated by steroid use   Uncontrolled DM w/ A1c 8.3    6. Hypertension; BP controlled  On HCtz and losartan    VTE: Lovenox subcut      Spoke with patient's daughter Mi [phone] updated on current condition and plan of care

## 2021-02-02 NOTE — CHART NOTE - NSCHARTNOTEFT_GEN_A_CORE
Spoke with pt's daughter by phone, very frustrated because mom keeps calling upset and in pain. Daughter inquiring about d/c to home vs transfer to another hospital  Explained that patient is unstable for d/c to home and it is likely she would deteriorate and possibly die if she left now. Also explained that patient too unstable to transfer, that she would likely not tolerate transport at this time.  Explained that if pt were to be transferred, it would be on pt/family to find a facility and accepting MD since it this time pt is receiving appropriate level of care and transfer is not medically indicated.   Reassured daughter I would assess her pain regimen.     Pt c/o persistent pain today, no longer relieved by MSO4 and APAP. Pt also frustrated by having to wait for PRN meds when she requests them.  Last night assessed by PA for low back pain, found Lidoderm patch helpful    1) C/o neck pain/stiffness and now w constant moderate frontal H/A unrelieved by meds today, No photophobia, no N/V. no vision changes  2) c/o anterior chest pain, worse with deep inspiration, pleuritic in nature.  again, APAP and MSO4 less helpful today.  3) c/o mild lower back pain and muscle spasm Spoke with pt's daughter by phone, very frustrated because mom keeps calling upset and in pain. Daughter inquiring about d/c to home vs transfer to another hospital  Explained that patient is unstable for d/c to home and it is likely she would deteriorate and possibly die if she left now. Also explained that patient too unstable to transfer, that she would likely not tolerate transport at this time.  Explained that if pt were to be transferred, it would be on pt/family to find a facility and accepting MD since it this time pt is receiving appropriate level of care and transfer is not medically indicated.   Reassured daughter I would assess her pain regimen.     Pt c/o persistent pain today, no longer relieved by MSO4 and APAP. Pt also frustrated by having to wait for PRN meds when she requests them.  Last night assessed by PA for low back pain, found Lidoderm patch helpful    1) C/o neck pain/stiffness and now w constant moderate frontal H/A unrelieved by meds today, No photophobia, no N/V. no vision changes  2) c/o anterior chest pain, worse with deep inspiration, pleuritic in nature.  again, APAP and MSO4 less helpful today.  3) c/o mild lower back pain and muscle spasm, requesting muscle relaxant. again, Lidoderm did provide some relief.    02-02    133<L>  |  96<L>  |  31.0<H>  ----------------------------<  192<H>  5.0   |  24.0  |  0.82    Ca    9.2      02 Feb 2021 13:25    TPro  6.8  /  Alb  2.9<L>  /  TBili  0.4  /  DBili  0.1  /  AST  26  /  ALT  17  /  AlkPhos  74  02-02    Allergies  No Known Allergies    Plan:  Toradol 15mg IV x1 now - if helpful consider PRN  Lidoderm 1 patch at bedtime x12h  APAP 650mg scheduled Q6h x4 doses  Flexeril 5mg Q8h x3 doses   Will reassess pain and follow-up tomorrow.

## 2021-02-02 NOTE — PROGRESS NOTE ADULT - ASSESSMENT
he patient is a 60 year old female with a past medical history of diabetes mellitus type 2 and hypertension who presented to the Er with vomiting and fever. Diagnosed with COVID on 1/22; Admitted for acute hypoxic respiratory failure secondary to COVID pneumonia. CTA of the chest and lower extremity duplex were negative.    Assessment/Plan:    1. Acute hypoxic respiratory failure secondary to COVID pneumonia: On hiflo NC with venti  Day 7 of IV remdesivir and decadron  Status post convalescent plasma on 1/28  Encourage prone ventilation and incentive spirometry  Follow up inflammatory markers, CBC and CMP today     2. Sepsis on admission secondary to COVID pneumonia: Now resolved    3. Atypical chest paiN: EKG and troponin negative  Chest xray negative  *** elevated D Dimer; initial CTA and duplex were negative on admission with normal D Dimer  Start therapeutic lovenox BID: LE duplex and echocardiogram at the bedside  Unable to obtain CTA while on HIflo    4. Normocytic anemia: PO ferrous sulfate OD     5. Diabetes mellitus type 2:   BSL stable   Continue lantus and ademelog   Uncontrolled DM w/ A1c 8.3    6. Hypertension; BP controlled  On HCtz and losartan    VTE: Lovenox subcut      Spoke with patient's daughter Mi [phone] updated on current condition and plan of care

## 2021-02-02 NOTE — PROGRESS NOTE ADULT - SUBJECTIVE AND OBJECTIVE BOX
CC: Follow up    INTERVAL HPI/OVERNIGHT EVENTS: Patient seen and examined, remains on high terese with venti mask. States she has left sided chest pain, difficulty breathing and pain on taking a deep breath.       Vital Signs Last 24 Hrs  T(C): 36.5 (02 Feb 2021 07:30), Max: 36.9 (02 Feb 2021 04:40)  T(F): 97.7 (02 Feb 2021 07:30), Max: 98.4 (02 Feb 2021 04:40)  HR: 78 (02 Feb 2021 07:30) (74 - 89)  BP: 122/67 (02 Feb 2021 07:30) (110/66 - 122/67)  BP(mean): --  RR: 18 (02 Feb 2021 07:30) (18 - 19)  SpO2: 94% (02 Feb 2021 07:30) (88% - 94%)    PHYSICAL EXAM:    GENERAL: NAD, AOX3  HEAD:  Atraumatic, Normocephalic  EYES: conjunctiva and sclera clear  ENMT: Moist mucous membranes  NECK: Supple, No JVD  CHEST/LUNG: Clear to auscultation bilaterally; No rales, rhonchi, wheezing, or rubs  HEART: Regular rate and rhythm; No murmurs, rubs, or gallops  ABDOMEN: Soft, Nontender, Nondistended; Bowel sounds present  EXTREMITIES:  2+ Peripheral Pulses, No clubbing, cyanosis, or edema        MEDICATIONS  (STANDING):  atorvastatin 10 milliGRAM(s) Oral at bedtime  dexAMETHasone  Injectable 6 milliGRAM(s) IV Push daily  dextrose 40% Gel 15 Gram(s) Oral once  dextrose 5%. 1000 milliLiter(s) (50 mL/Hr) IV Continuous <Continuous>  dextrose 5%. 1000 milliLiter(s) (100 mL/Hr) IV Continuous <Continuous>  dextrose 50% Injectable 25 Gram(s) IV Push once  dextrose 50% Injectable 25 Gram(s) IV Push once  enoxaparin Injectable 72 milliGRAM(s) SubCutaneous every 12 hours  ferrous    sulfate 325 milliGRAM(s) Oral daily  glucagon  Injectable 1 milliGRAM(s) IntraMuscular once  hydrochlorothiazide 12.5 milliGRAM(s) Oral daily  insulin glargine Injectable (LANTUS) 25 Unit(s) SubCutaneous at bedtime  insulin lispro (ADMELOG) corrective regimen sliding scale   SubCutaneous three times a day before meals  insulin lispro (ADMELOG) corrective regimen sliding scale   SubCutaneous at bedtime  insulin lispro Injectable (ADMELOG) 8 Unit(s) SubCutaneous three times a day before meals  losartan 50 milliGRAM(s) Oral daily  pantoprazole    Tablet 40 milliGRAM(s) Oral before breakfast  remdesivir  IVPB 100 milliGRAM(s) IV Intermittent every 24 hours    MEDICATIONS  (PRN):  acetaminophen   Tablet .. 650 milliGRAM(s) Oral every 6 hours PRN Temp greater or equal to 38C (100.4F), Mild Pain (1 - 3)  aluminum hydroxide/magnesium hydroxide/simethicone Suspension 30 milliLiter(s) Oral every 6 hours PRN Dyspepsia  benzonatate 100 milliGRAM(s) Oral every 8 hours PRN Cough  morphine  - Injectable 1 milliGRAM(s) IV Push every 6 hours PRN Severe Pain (7 - 10)      Allergies    Allergy Status Unknown  No Known Allergies    Intolerances          LABS:                          11.8   17.15 )-----------( 293      ( 01 Feb 2021 09:19 )             34.4     02-01    x   |  x   |  x   ----------------------------<  x   x    |  x   |  0.89    Ca    9.0      01 Feb 2021 09:19    TPro  6.6  /  Alb  3.1<L>  /  TBili  0.5  /  DBili  0.2  /  AST  31  /  ALT  21  /  AlkPhos  74  02-01          RADIOLOGY & ADDITIONAL TESTS:

## 2021-02-03 NOTE — CHART NOTE - NSCHARTNOTEFT_GEN_A_CORE
rrt called for desaturation and chest pain      provider to rrt - patient stating chest pain radiating to back     morphine 2mg x 1 for resp distress and chest pain  trop mg phos lactate ordered via PA  ekg ordered via PA  bipap started    further mgmt per Primary Hospitalist Dr Paez who is bedside

## 2021-02-03 NOTE — PROGRESS NOTE ADULT - SUBJECTIVE AND OBJECTIVE BOX
CC: Follow up    INTERVAL HPI/OVERNIGHT EVENTS: Patient seen and examined, RRT called this morning for worsening hypoxia on 100% hiflo nasal cannula and Venti mask.       Vital Signs Last 24 Hrs  T(C): 36.8 (02 Feb 2021 16:21), Max: 36.8 (02 Feb 2021 16:21)  T(F): 98.3 (02 Feb 2021 16:21), Max: 98.3 (02 Feb 2021 16:21)  HR: 83 (02 Feb 2021 23:26) (75 - 85)  BP: 123/74 (02 Feb 2021 16:21) (123/74 - 123/74)  BP(mean): --  RR: 20 (02 Feb 2021 16:21) (20 - 20)  SpO2: 93% (02 Feb 2021 23:26) (90% - 93%)    PHYSICAL EXAM:    GENERAL: NAD, AOX3  HEAD:  Atraumatic, Normocephalic  EYES:  conjunctiva and sclera clear  ENMT: Moist mucous membranes  NECK: Supple, No JVD  CHEST/LUNG: Clear to auscultation bilaterally; No rales, rhonchi, wheezing, or rubs  HEART: Regular rate and rhythm; No murmurs, rubs, or gallops  ABDOMEN: Soft, Nontender, Nondistended; Bowel sounds present  EXTREMITIES:  2+ Peripheral Pulses, No clubbing, cyanosis, or edema        MEDICATIONS  (STANDING):  acetaminophen   Tablet .. 650 milliGRAM(s) Oral every 6 hours  atorvastatin 10 milliGRAM(s) Oral at bedtime  cyclobenzaprine 5 milliGRAM(s) Oral three times a day  dexAMETHasone  Injectable 6 milliGRAM(s) IV Push daily  dextrose 40% Gel 15 Gram(s) Oral once  dextrose 5%. 1000 milliLiter(s) (50 mL/Hr) IV Continuous <Continuous>  dextrose 5%. 1000 milliLiter(s) (100 mL/Hr) IV Continuous <Continuous>  dextrose 50% Injectable 25 Gram(s) IV Push once  dextrose 50% Injectable 25 Gram(s) IV Push once  enoxaparin Injectable 70 milliGRAM(s) SubCutaneous every 12 hours  ferrous    sulfate 325 milliGRAM(s) Oral daily  glucagon  Injectable 1 milliGRAM(s) IntraMuscular once  hydrochlorothiazide 12.5 milliGRAM(s) Oral daily  insulin glargine Injectable (LANTUS) 25 Unit(s) SubCutaneous at bedtime  insulin lispro (ADMELOG) corrective regimen sliding scale   SubCutaneous three times a day before meals  insulin lispro (ADMELOG) corrective regimen sliding scale   SubCutaneous at bedtime  insulin lispro Injectable (ADMELOG) 12 Unit(s) SubCutaneous three times a day before meals  lidocaine   Patch 1 Patch Transdermal every 24 hours  losartan 50 milliGRAM(s) Oral daily  morphine  - Injectable 2 milliGRAM(s) IV Push once  pantoprazole    Tablet 40 milliGRAM(s) Oral before breakfast  remdesivir  IVPB 100 milliGRAM(s) IV Intermittent every 24 hours    MEDICATIONS  (PRN):  acetaminophen   Tablet .. 650 milliGRAM(s) Oral every 6 hours PRN Temp greater or equal to 38C (100.4F), Mild Pain (1 - 3)  aluminum hydroxide/magnesium hydroxide/simethicone Suspension 30 milliLiter(s) Oral every 6 hours PRN Dyspepsia  benzonatate 100 milliGRAM(s) Oral every 8 hours PRN Cough  morphine  - Injectable 1 milliGRAM(s) IV Push every 6 hours PRN Severe Pain (7 - 10)      Allergies    Allergy Status Unknown  No Known Allergies    Intolerances          LABS:                          11.4   18.37 )-----------( 231      ( 03 Feb 2021 08:46 )             34.5     02-03    x   |  x   |  x   ----------------------------<  x   x    |  x   |  0.72    Ca    9.2      02 Feb 2021 13:25    TPro  6.4<L>  /  Alb  2.6<L>  /  TBili  0.8  /  DBili  0.2  /  AST  25  /  ALT  15  /  AlkPhos  72  02-03          RADIOLOGY & ADDITIONAL TESTS:

## 2021-02-03 NOTE — CHART NOTE - NSCHARTNOTEFT_GEN_A_CORE
Patient seen and examined again, Spo2 on currently at 92% on Bipap calm and comfortable. Spoke with the ICU PA, upgraded to ICU. Patient's daughter Mi was updated on patient's condition and plan of care

## 2021-02-03 NOTE — RAPID RESPONSE TEAM SUMMARY - NSSITUATIONBACKGROUNDRRT_GEN_ALL_CORE
The patient is a 60 year old female with a past medical history of DM type 2 and HTN who presented to the Er with vomiting and fever. Diagnosed with COVID on 1/22; Admitted for acute hypoxic respiratory failure secondary to COVID pneumonia. CTA of the chest and lower extremity duplex were negative on admission. Yesterday began to have increased O2 requirement, on high flow

## 2021-02-03 NOTE — CONSULT NOTE ADULT - ASSESSMENT
59 yo female, PMHx HTN, T2DM, HLD, diagnosed with COVID-19 on 1/22, admitted with acute hypoxemic respiratory failure secondary to COVID-19 viral pneumonia on 1/25, with acute LLE DVT.    Transfer to MICU   CPAP augmented to maintain SpO2 >90%  Continue Remdesivir and IV steroids   Full anticoagulation for confirmed LE DVT with possible PE  On broad-spectrum antibiotics for possible superimposed Gram negative healthcare associated pneumonia   Appears hypovolemic with borderline BP, hyponatremia, and prerenal azotemia, give 1L IVF bolus and reassess response to resuscitation  High risk for necessitating intubation

## 2021-02-03 NOTE — PROGRESS NOTE ADULT - ASSESSMENT
61 y/o F w/ a PMH of DMII, HTN came in c/o sob worsening over the past week and associated nausea and NBNB vomiting x 2 days.  Pt was recently diagnosed as an outpt w/ COVID on 1/22 and was told by PCP if her O2 sat went <92 she should go to the hospital.  Pt came to the ED 2 days ago and O2 sat was noted to be >90% and was discharged home.  Pts SOB did not improve and she came back in today.  Pt also reported subjective fevers/chills at home.    COVID 19 + infection  Viral pneumonia  Acute hypoxic respiratory failure  Leukocytosis  LLE DVT and probable PE      - patient with worsening respiratory status now on BIPAP, found to have LLE DVT, likely has PE also contributing to worsening hypoxia  - inflammatory markers are elevated  - D dimer >6000, USG doppler LLE +DVT  - procalcitonin 0.48-->0.27  - WBC elevated; likely reactive  - Remdesivir extended for 10 day course  - Decadron 6mg IV/PO daily while on Remdesivir  - s/p convalescent plasma 1/29  - agree with empiric cefepime x 5-7 days  - agree with full dose AC  - f/u repeat BCX  - Check CBC with diff, CMP with LFT's daily, Inflammatory markers, D dimer, procalcitonin q48h.   - Encourage self proning q2h, incentive spirometry and ambulation as tolerated  - Taper off O2 as tolerated  - Inpatient Contact/Airborne isolation       prognosis guarded  d/w Dr Melgoza

## 2021-02-03 NOTE — GOALS OF CARE CONVERSATION - ADVANCED CARE PLANNING - CONVERSATION DETAILS
Spoke with patient and her daughter Mi on the phone. Current condition, decompensation and prognosis was discussed. They want everything to be done, including CPR and intubation.

## 2021-02-03 NOTE — GOALS OF CARE CONVERSATION - ADVANCED CARE PLANNING - NSGCTIMESPENT_GEN_ALL_CORE
Detail Level: Simple
35
Detail Level: Zone
Detail Level: Detailed
Patient Specific Counseling (Will Not Stick From Patient To Patient): Will re-evaluate at next visit.

## 2021-02-03 NOTE — PROGRESS NOTE ADULT - ASSESSMENT
The patient is a 60 year old female with a past medical history of diabetes mellitus type 2 and hypertension who presented to the Er with vomiting and fever. Diagnosed with COVID on 1/22; Admitted for acute hypoxic respiratory failure secondary to COVID pneumonia. CTA of the chest and lower extremity duplex were negative. Started on IV remdesivir and decadron for 10 days. Patient with worsening hypoxia on HIflo. Repeat D dimer was elevated; LE duplex positive for DVT started on Lovenox BID. Echocardiogram was normal.     Assessment/Plan:    1. Acute hypoxic respiratory failure secondary to COVID pneumonia and suspected PE with +DVT  Worsening hypoxia this morning  ++ LE DVT suspect PE- Lovenox BID  Upgraded to Continous Bipap  Day 8 of IV remdesivir and decadron  Status post convalescent plasma on 1/28  Stat Chest xray and EKG ordered    2. Sepsis on admission secondary to COVID pneumonia: Now resolved  Repeat STAT lactate    3. Normocytic anemia: PO ferrous sulfate OD     4. Diabetes mellitus type 2:   BSL stable   Continue lantus and ademelog   Uncontrolled DM w/ A1c 8.3    5. Hypertension; BP low this morning  Follow up CMP and lactate   Monitor closely on HCTZ and losartan     Spoke with patient's daughter Mi [phone] updated on current condition and plan of care and Rapid response    Code status: Full Code       The patient is a 60 year old female with a past medical history of diabetes mellitus type 2 and hypertension who presented to the Er with vomiting and fever. Diagnosed with COVID on 1/22; Admitted for acute hypoxic respiratory failure secondary to COVID pneumonia. CTA of the chest and lower extremity duplex were negative. Started on IV remdesivir and decadron for 10 days. Patient with worsening hypoxia on HIflo. Repeat D dimer was elevated; LE duplex positive for DVT started on Lovenox BID. Echocardiogram was normal.     Assessment/Plan:    1. Acute hypoxic respiratory failure secondary to COVID pneumonia and suspected PE with +DVT  Worsening hypoxia this morning  ++ LE DVT suspect PE- Lovenox BID  Upgraded to Continous Bipap  Day 8 of IV remdesivir and decadron  Status post convalescent plasma on 1/28  Stat Chest xray and EKG ordered    2. Sepsis with elevated lactate and CBC  Sepsis protocol- IV fluids, blood cultures x 2 and urine culture. CHest xray with bilateral infiltrates  Start empiric IV cefepime    3. Normocytic anemia: PO ferrous sulfate OD     4. Diabetes mellitus type 2:   BSL stable   Continue lantus and ademelog   Uncontrolled DM w/ A1c 8.3    5. Hypertension; BP low this morning  Hold HCtz and losartan     Spoke with patient's daughter Mi [phone] updated on current condition and plan of care and Rapid response    Code status: Full Code

## 2021-02-03 NOTE — CONSULT NOTE ADULT - SUBJECTIVE AND OBJECTIVE BOX
Patient is a 60y old  Female who presents with a chief complaint of SOB/COVID (2021 11:50)      BRIEF HOSPITAL COURSE: ***      Events last 24 hours: ***      PAST MEDICAL & SURGICAL HISTORY:  Hyperlipidemia    Liver disease  (unable to take tylenol )    Hypertension    Diabetes    Diverticulitis    High cholesterol    HTN (hypertension)    DM (diabetes mellitus)    H/O:             SOCIAL HISTORY:        Review of Systems:  CONSTITUTIONAL: No fever, chills, or fatigue  EYES: No visual disturbances  ENMT:  No difficulty hearing  NECK: No pain  RESPIRATORY: No cough. No shortness of breath  CARDIOVASCULAR: No chest pain, palpitations, or leg swelling  GASTROINTESTINAL: No abdominal pain. No nausea, vomiting, diarrhea, or constipation. No hematemesis, melena or hematochezia  GENITOURINARY: No dysuria, frequency, hematuria, or incontinence  NEUROLOGICAL: No headaches, loss of strength, numbness, or tremors  SKIN: No rashes  MUSCULOSKELETAL: No back or extremity pain. No calf pain  PSYCHIATRIC: No depression, anxiety, or difficulty sleeping      [  ] Due to altered mental status/intubation, subjective information was not able to be obtained from the patient. History was obtained, to the extent possible, from review of the chart and collateral sources of information.        Medications:  cefepime   IVPB      cefepime   IVPB 2000 milliGRAM(s) IV Intermittent every 8 hours  remdesivir  IVPB 100 milliGRAM(s) IV Intermittent every 24 hours      benzonatate 100 milliGRAM(s) Oral every 8 hours PRN    acetaminophen   Tablet .. 650 milliGRAM(s) Oral every 6 hours PRN  morphine  - Injectable 1 milliGRAM(s) IV Push every 6 hours PRN      enoxaparin Injectable 70 milliGRAM(s) SubCutaneous every 12 hours    aluminum hydroxide/magnesium hydroxide/simethicone Suspension 30 milliLiter(s) Oral every 6 hours PRN  pantoprazole    Tablet 40 milliGRAM(s) Oral before breakfast      atorvastatin 10 milliGRAM(s) Oral at bedtime  dexAMETHasone  Injectable 6 milliGRAM(s) IV Push daily  dextrose 40% Gel 15 Gram(s) Oral once  dextrose 50% Injectable 25 Gram(s) IV Push once  dextrose 50% Injectable 25 Gram(s) IV Push once  glucagon  Injectable 1 milliGRAM(s) IntraMuscular once  insulin glargine Injectable (LANTUS) 25 Unit(s) SubCutaneous at bedtime  insulin lispro (ADMELOG) corrective regimen sliding scale   SubCutaneous at bedtime  insulin lispro (ADMELOG) corrective regimen sliding scale   SubCutaneous three times a day before meals  insulin lispro Injectable (ADMELOG) 12 Unit(s) SubCutaneous three times a day before meals    dextrose 5%. 1000 milliLiter(s) IV Continuous <Continuous>  dextrose 5%. 1000 milliLiter(s) IV Continuous <Continuous>  ferrous    sulfate 325 milliGRAM(s) Oral daily      lidocaine   Patch 1 Patch Transdermal every 24 hours            ICU Vital Signs Last 24 Hrs  T(C): 37.1 (2021 07:40), Max: 37.1 (2021 07:40)  T(F): 98.7 (2021 07:40), Max: 98.7 (2021 07:40)  HR: 82 (2021 16:13) (82 - 97)  BP: 101/60 (2021 07:40) (101/60 - 101/60)  BP(mean): --  ABP: --  ABP(mean): --  RR: 18 (2021 07:40) (18 - 18)  SpO2: 92% (2021 16:13) (90% - 93%)      ABG - ( 2021 09:52 )  pH, Arterial: 7.45  pH, Blood: x     /  pCO2: 39    /  pO2: 56    / HCO3: 27    / Base Excess: 2.6   /  SaO2: 90                  I&O's Detail        LABS:                        11.4   18.37 )-----------( 231      ( 2021 08:46 )             34.5     02-03    132<L>  |  97<L>  |  37.0<H>  ----------------------------<  155<H>  4.2   |  23.0  |  0.70    Ca    8.7      2021 08:44  Phos  3.6     02-03  Mg     2.0     02-03    TPro  6.5<L>  /  Alb  2.9<L>  /  TBili  0.8  /  DBili  x   /  AST  24  /  ALT  16  /  AlkPhos  72  02-03          CAPILLARY BLOOD GLUCOSE      POCT Blood Glucose.: 167 mg/dL (2021 15:55)        CULTURES:            Physical Examination:    General: No acute distress.      HEENT: Pupils equal, reactive to light.  Symmetric.    PULM: Clear to auscultation bilaterally, no significant sputum production    CVS: Regular rate and rhythm, no murmurs, rubs, or gallops    ABD: Soft, nondistended, nontender, normoactive bowel sounds, no masses    EXT: No edema, nontender    SKIN: Warm and well perfused, no rashes noted.    NEURO: Alert, oriented, interactive, nonfocal        RADIOLOGY: ***        INVASIVE LINES:  INDWELLING KWOK:  VTE PROPHYLAXIS:  CAM ICU:  CODE STATUS:        CRITICAL CARE TIME SPENT: *** minutes spent performing frequent bedside reassessments and augmenting plan of care to address problems of acute critical illness that pose high probability of life threatening deterioration and/or end organ damage/dysfunction and discussing goals of care, non-inclusive of time spent on procedures performed. Patient is a 60y old  Female who presents with a chief complaint of SOB/COVID (2021 11:50)      BRIEF HOSPITAL COURSE: 61 yo female, PMHx HTN, T2DM, HLD, admitted with COVID-19 viral pneumonia       Events last 24 hours: ***      PAST MEDICAL & SURGICAL HISTORY:  Hyperlipidemia    Liver disease  (unable to take tylenol )    Hypertension    Diabetes    Diverticulitis    High cholesterol    HTN (hypertension)    DM (diabetes mellitus)    H/O:             SOCIAL HISTORY:        Review of Systems:  CONSTITUTIONAL: No fever, chills, or fatigue  EYES: No visual disturbances  ENMT:  No difficulty hearing  NECK: No pain  RESPIRATORY: No cough. No shortness of breath  CARDIOVASCULAR: No chest pain, palpitations, or leg swelling  GASTROINTESTINAL: No abdominal pain. No nausea, vomiting, diarrhea, or constipation. No hematemesis, melena or hematochezia  GENITOURINARY: No dysuria, frequency, hematuria, or incontinence  NEUROLOGICAL: No headaches, loss of strength, numbness, or tremors  SKIN: No rashes  MUSCULOSKELETAL: No back or extremity pain. No calf pain  PSYCHIATRIC: No depression, anxiety, or difficulty sleeping      [  ] Due to altered mental status/intubation, subjective information was not able to be obtained from the patient. History was obtained, to the extent possible, from review of the chart and collateral sources of information.        Medications:  cefepime   IVPB      cefepime   IVPB 2000 milliGRAM(s) IV Intermittent every 8 hours  remdesivir  IVPB 100 milliGRAM(s) IV Intermittent every 24 hours      benzonatate 100 milliGRAM(s) Oral every 8 hours PRN    acetaminophen   Tablet .. 650 milliGRAM(s) Oral every 6 hours PRN  morphine  - Injectable 1 milliGRAM(s) IV Push every 6 hours PRN      enoxaparin Injectable 70 milliGRAM(s) SubCutaneous every 12 hours    aluminum hydroxide/magnesium hydroxide/simethicone Suspension 30 milliLiter(s) Oral every 6 hours PRN  pantoprazole    Tablet 40 milliGRAM(s) Oral before breakfast      atorvastatin 10 milliGRAM(s) Oral at bedtime  dexAMETHasone  Injectable 6 milliGRAM(s) IV Push daily  dextrose 40% Gel 15 Gram(s) Oral once  dextrose 50% Injectable 25 Gram(s) IV Push once  dextrose 50% Injectable 25 Gram(s) IV Push once  glucagon  Injectable 1 milliGRAM(s) IntraMuscular once  insulin glargine Injectable (LANTUS) 25 Unit(s) SubCutaneous at bedtime  insulin lispro (ADMELOG) corrective regimen sliding scale   SubCutaneous at bedtime  insulin lispro (ADMELOG) corrective regimen sliding scale   SubCutaneous three times a day before meals  insulin lispro Injectable (ADMELOG) 12 Unit(s) SubCutaneous three times a day before meals    dextrose 5%. 1000 milliLiter(s) IV Continuous <Continuous>  dextrose 5%. 1000 milliLiter(s) IV Continuous <Continuous>  ferrous    sulfate 325 milliGRAM(s) Oral daily      lidocaine   Patch 1 Patch Transdermal every 24 hours            ICU Vital Signs Last 24 Hrs  T(C): 37.1 (2021 07:40), Max: 37.1 (2021 07:40)  T(F): 98.7 (2021 07:40), Max: 98.7 (2021 07:40)  HR: 82 (2021 16:13) (82 - 97)  BP: 101/60 (2021 07:40) (101/60 - 101/60)  BP(mean): --  ABP: --  ABP(mean): --  RR: 18 (2021 07:40) (18 - 18)  SpO2: 92% (2021 16:13) (90% - 93%)      ABG - ( 2021 09:52 )  pH, Arterial: 7.45  pH, Blood: x     /  pCO2: 39    /  pO2: 56    / HCO3: 27    / Base Excess: 2.6   /  SaO2: 90                  I&O's Detail        LABS:                        11.4   18.37 )-----------( 231      ( 2021 08:46 )             34.5     02-03    132<L>  |  97<L>  |  37.0<H>  ----------------------------<  155<H>  4.2   |  23.0  |  0.70    Ca    8.7      2021 08:44  Phos  3.6     02-03  Mg     2.0     02-03    TPro  6.5<L>  /  Alb  2.9<L>  /  TBili  0.8  /  DBili  x   /  AST  24  /  ALT  16  /  AlkPhos  72  -          CAPILLARY BLOOD GLUCOSE      POCT Blood Glucose.: 167 mg/dL (2021 15:55)        CULTURES:            Physical Examination:    General: No acute distress.      HEENT: Pupils equal, reactive to light.  Symmetric.    PULM: Clear to auscultation bilaterally, no significant sputum production    CVS: Regular rate and rhythm, no murmurs, rubs, or gallops    ABD: Soft, nondistended, nontender, normoactive bowel sounds, no masses    EXT: No edema, nontender    SKIN: Warm and well perfused, no rashes noted.    NEURO: Alert, oriented, interactive, nonfocal        RADIOLOGY: ***        INVASIVE LINES:  INDWELLING KWOK:  VTE PROPHYLAXIS:  CAM ICU:  CODE STATUS:        CRITICAL CARE TIME SPENT: *** minutes spent performing frequent bedside reassessments and augmenting plan of care to address problems of acute critical illness that pose high probability of life threatening deterioration and/or end organ damage/dysfunction and discussing goals of care, non-inclusive of time spent on procedures performed. Patient is a 60y old  Female who presents with a chief complaint of SOB/COVID (2021 11:50)      BRIEF HOSPITAL COURSE: 59 yo female, PMHx HTN, T2DM, HLD, who presented with vomiting and fever, diagnosed with COVID-19 on . Admitted with acute hypoxemic respiratory failure secondary to COVID-19 viral pneumonia on . Initial CTA negative for PE, and initial LE duplex negative for DVT. She was started on Remdesivir and IV Decadron. Found to have acute LLE DVT on , started on full dose Lovenox. TTE gI dHFpEF without acute cor pulmonale.         Events last 24 hours: RRT this morning for hypoxemia, SpO2 83% patient lethargic and difficult to arouse. Started on BiPAP /.00, remained hypoxic and I was called for MICU consult for worsening hypoxemic respiratory failure refractory to NIPPV. ABG obtained, severely hypoxemic with paO2 51. Tv ~860cc on BiPAP, SpO2 mid-80's, changed to CPAP @ 14 FiO2 100% with improvement in oxygenation to SpO2 92%. On evaluation of telemetry monitoring, patient has been hypoxic since at least midnight with SpO2 83% on HFNC/NRB. Patient had reportedly complained of chest pain. At present time she denies chest pain, EKG obtained NSR @ 88 bpm without acute MAC/D or TWI.        PAST MEDICAL & SURGICAL HISTORY:  Hyperlipidemia    Liver disease  (unable to take tylenol )    Hypertension    Diabetes    Diverticulitis    High cholesterol    HTN (hypertension)    DM (diabetes mellitus)    H/O:             SOCIAL HISTORY: unable to obtain due to medical urgency and critical illness         Review of Systems: unable to obtain due to medical urgency and critical illness           Medications:  cefepime   IVPB      cefepime   IVPB 2000 milliGRAM(s) IV Intermittent every 8 hours  remdesivir  IVPB 100 milliGRAM(s) IV Intermittent every 24 hours  benzonatate 100 milliGRAM(s) Oral every 8 hours PRN  acetaminophen   Tablet .. 650 milliGRAM(s) Oral every 6 hours PRN  morphine  - Injectable 1 milliGRAM(s) IV Push every 6 hours PRN  enoxaparin Injectable 70 milliGRAM(s) SubCutaneous every 12 hours  aluminum hydroxide/magnesium hydroxide/simethicone Suspension 30 milliLiter(s) Oral every 6 hours PRN  pantoprazole    Tablet 40 milliGRAM(s) Oral before breakfast  atorvastatin 10 milliGRAM(s) Oral at bedtime  dexAMETHasone  Injectable 6 milliGRAM(s) IV Push daily  dextrose 40% Gel 15 Gram(s) Oral once  dextrose 50% Injectable 25 Gram(s) IV Push once  dextrose 50% Injectable 25 Gram(s) IV Push once  glucagon  Injectable 1 milliGRAM(s) IntraMuscular once  insulin glargine Injectable (LANTUS) 25 Unit(s) SubCutaneous at bedtime  insulin lispro (ADMELOG) corrective regimen sliding scale   SubCutaneous at bedtime  insulin lispro (ADMELOG) corrective regimen sliding scale   SubCutaneous three times a day before meals  insulin lispro Injectable (ADMELOG) 12 Unit(s) SubCutaneous three times a day before meals  dextrose 5%. 1000 milliLiter(s) IV Continuous <Continuous>  dextrose 5%. 1000 milliLiter(s) IV Continuous <Continuous>  ferrous    sulfate 325 milliGRAM(s) Oral daily  lidocaine   Patch 1 Patch Transdermal every 24 hours            ICU Vital Signs Last 24 Hrs  T(C): 37.1 (2021 07:40), Max: 37.1 (2021 07:40)  T(F): 98.7 (2021 07:40), Max: 98.7 (2021 07:40)  HR: 82 (2021 16:13) (82 - 97)  BP: 101/60 (2021 07:40) (101/60 - 101/60)  BP(mean): --  ABP: --  ABP(mean): --  RR: 18 (2021 07:40) (18 - 18)  SpO2: 92% (2021 16:13) (90% - 93%)      ABG - ( 2021 09:52 )  pH, Arterial: 7.45  pH, Blood: x     /  pCO2: 39    /  pO2: 56    / HCO3: 27    / Base Excess: 2.6   /  SaO2: 90                  I&O's Detail        LABS:                        11.4   18.37 )-----------( 231      ( 2021 08:46 )             34.5     02-03    132<L>  |  97<L>  |  37.0<H>  ----------------------------<  155<H>  4.2   |  23.0  |  0.70    Ca    8.7      2021 08:44  Phos  3.6     02-03  Mg     2.0     02-03    TPro  6.5<L>  /  Alb  2.9<L>  /  TBili  0.8  /  DBili  x   /  AST  24  /  ALT  16  /  AlkPhos  72  02-          CAPILLARY BLOOD GLUCOSE      POCT Blood Glucose.: 167 mg/dL (2021 15:55)        CULTURES:            Physical Examination:    General: Toxic appearing, lethargic, tachypneic     HEENT: Pupils equal, reactive to light.  Symmetric.    PULM: Diffuse ronchi bilaterally     CVS: Regular rate and rhythm, no murmurs    ABD: Soft, nondistended, nontender    EXT: No edema    SKIN: Warm and well perfused, no rashes noted.    NEURO: Somnolent but arousable to verbal stimuli and interactive, nonfocal        RADIOLOGY: < from: Xray Chest 1 View- PORTABLE-Routine (Xray Chest 1 View- PORTABLE-Routine .) (21 @ 09:13) >     EXAM:  XR CHEST PORTABLE ROUTINE 1V                          PROCEDURE DATE:  2021          INTERPRETATION:  Portable chest radiograph    CLINICAL INFORMATION: Dyspnea, shortness of breath.    TECHNIQUE:  Portable  AP view of the chest was obtained.    COMPARISON: 2021 chest available for review.    FINDINGS:  The lungs show slight increase in bilateral  multifocal and diffuse ill-defined airspace consolidations.. No pneumothorax.    The heart and mediastinum are within normal limits.    Visualized osseous structures are intact.        IMPRESSION:   Slight increase in bilateral  multifocal and diffuse ill-defined airspace consolidations..              RICCARDO SCHUMACHER MD; Attending Radiologist  This document has been electronically signed. Feb  3 2021  2:58PM    < end of copied text >        CODE STATUS: FULL        CRITICAL CARE TIME SPENT: 45 minutes spent performing frequent bedside reassessments and augmenting plan of care to address problems of acute critical illness that pose high probability of life threatening deterioration and/or end organ damage/dysfunction and discussing goals of care, non-inclusive of time spent on procedures performed.

## 2021-02-03 NOTE — PROGRESS NOTE ADULT - SUBJECTIVE AND OBJECTIVE BOX
[facility]  INFECTIOUS DISEASES AND INTERNAL MEDICINE at [place]  =======================================================  Miguel Aponte MD  Diplomates American Board of Internal Medicine and Infectious Diseases  Tel: [phone]      Fax: [phone]  =======================================================    MAYA LOTT [id number]    Follow up: covid 19  s/p RRt this AM for hypoxia, chest pain  patient now on bipap  found to have elevated D dimer, + left soleal vein DVT      Allergies:  Allergy Status Unknown  No Known Allergies           REVIEW OF SYSTEMS:  unable to obtain arousable but patient lethargic      Physical Exam:  GEN: NAD, on bipap, lethargic  HEENT: normocephalic and atraumatic. EOMI. PERRL.  Anicteric   NECK: Supple.   LUNGS: Clear to auscultation.  HEART: Regular rate and rhythm without murmur.  ABDOMEN: Soft, nontender, and nondistended.  Positive bowel sounds.    : No CVA tenderness  EXTREMITIES: Without any edema.  MSK: no joint swelling  NEUROLOGIC: Cranial nerves II through XII are grossly intact. No focal deficits  PSYCHIATRIC: Appropriate affect .  SKIN: No Rash      Vitals:    T(F): 98.7 (03 Feb 2021 07:40), Max: 98.7 (03 Feb 2021 07:40)  HR: 91 (03 Feb 2021 09:24)  BP: 101/60 (03 Feb 2021 07:40)  RR: 18 (03 Feb 2021 07:40)  SpO2: 92% (03 Feb 2021 09:24) (90% - 93%)  temp max in last 48H T(F): , Max: 98.7 (02-03-21 @ 07:40)    Current Antibiotics:  cefepime   IVPB      cefepime   IVPB 2000 milliGRAM(s) IV Intermittent every 8 hours  remdesivir  IVPB 100 milliGRAM(s) IV Intermittent every 24 hours    Other medications:  acetaminophen   Tablet .. 650 milliGRAM(s) Oral every 6 hours  atorvastatin 10 milliGRAM(s) Oral at bedtime  dexAMETHasone  Injectable 6 milliGRAM(s) IV Push daily  dextrose 40% Gel 15 Gram(s) Oral once  dextrose 5%. 1000 milliLiter(s) IV Continuous <Continuous>  dextrose 5%. 1000 milliLiter(s) IV Continuous <Continuous>  dextrose 50% Injectable 25 Gram(s) IV Push once  dextrose 50% Injectable 25 Gram(s) IV Push once  enoxaparin Injectable 70 milliGRAM(s) SubCutaneous every 12 hours  ferrous    sulfate 325 milliGRAM(s) Oral daily  glucagon  Injectable 1 milliGRAM(s) IntraMuscular once  insulin glargine Injectable (LANTUS) 25 Unit(s) SubCutaneous at bedtime  insulin lispro (ADMELOG) corrective regimen sliding scale   SubCutaneous three times a day before meals  insulin lispro (ADMELOG) corrective regimen sliding scale   SubCutaneous at bedtime  insulin lispro Injectable (ADMELOG) 12 Unit(s) SubCutaneous three times a day before meals  lidocaine   Patch 1 Patch Transdermal every 24 hours  pantoprazole    Tablet 40 milliGRAM(s) Oral before breakfast                            11.4   18.37 )-----------( 231      ( 03 Feb 2021 08:46 )             34.5     02-03    132<L>  |  97<L>  |  37.0<H>  ----------------------------<  155<H>  4.2   |  23.0  |  0.70    Ca    8.7      03 Feb 2021 08:44  Phos  3.6     02-03  Mg     2.0     02-03    TPro  6.5<L>  /  Alb  2.9<L>  /  TBili  0.8  /  DBili  x   /  AST  24  /  ALT  16  /  AlkPhos  72  02-03    RECENT CULTURES:  01-25 @ 18:35          [facility]      WBC Count: 18.37 K/uL (02-03-21 @ 08:46)  WBC Count: 16.98 K/uL (02-02-21 @ 13:25)  WBC Count: 17.15 K/uL (02-01-21 @ 09:19)  WBC Count: 14.54 K/uL (01-31-21 @ 07:19)  WBC Count: 12.08 K/uL (01-30-21 @ 06:36)    Creatinine, Serum: 0.70 mg/dL (02-03-21 @ 08:44)  Creatinine, Serum: 0.72 mg/dL (02-03-21 @ 07:39)  Creatinine, Serum: 0.70 mg/dL (02-03-21 @ 07:39)  Creatinine, Serum: 0.82 mg/dL (02-02-21 @ 13:25)  Creatinine, Serum: 0.89 mg/dL (02-01-21 @ 13:15)  Creatinine, Serum: 0.73 mg/dL (02-01-21 @ 09:19)  Creatinine, Serum: 0.58 mg/dL (01-31-21 @ 07:19)  Creatinine, Serum: 0.62 mg/dL (01-30-21 @ 06:36)  Creatinine, Serum: 0.66 mg/dL (01-29-21 @ 12:59)    C-Reactive Protein, Serum: 5.83 mg/dL (01-27-21 @ 21:55)  C-Reactive Protein, Serum: 6.86 mg/dL (01-25-21 @ 16:22)    Ferritin, Serum: 915 ng/mL (01-27-21 @ 21:55)  Ferritin, Serum: 356 ng/mL (01-25-21 @ 16:22)      Procalcitonin, Serum: 0.27 ng/mL (02-03-21 @ 08:46)  Procalcitonin, Serum: 0.24 ng/mL (02-03-21 @ 07:39)  Procalcitonin, Serum: 0.22 ng/mL (02-02-21 @ 13:25)  Procalcitonin, Serum: 0.21 ng/mL (01-29-21 @ 06:11)  Procalcitonin, Serum: 0.48 ng/mL (01-27-21 @ 21:55)  Procalcitonin, Serum: 0.67 ng/mL (01-25-21 @ 16:22)     Rapid RVP Result: NotDetec (01-25-21 @ 18:35)  SARS-CoV-2: Detected (01-25-21 @ 18:35)

## 2021-02-04 NOTE — PROGRESS NOTE ADULT - SUBJECTIVE AND OBJECTIVE BOX
[facility]  INFECTIOUS DISEASES AND INTERNAL MEDICINE at [place]  =======================================================  Miguel Aponte MD  Diplomates American Board of Internal Medicine and Infectious Diseases  Tel: [phone]      Fax: [phone]  =======================================================    MAYA LOTT [id number]    Follow up: covid 19  remains on bipap  now with new chest pain      Allergies:  Allergy Status Unknown  No Known Allergies           REVIEW OF SYSTEMS:  CONSTITUTIONAL:  No Fever or chills  HEENT:   No diplopia or blurred vision.  No earache, sore throat or runny nose.  CARDIOVASCULAR:  No pressure, squeezing, strangling, tightness, heaviness or aching about the chest, neck, axilla or epigastrium.  RESPIRATORY:  No cough, shortness of breath  GASTROINTESTINAL:  No nausea, vomiting or diarrhea.  GENITOURINARY:  No dysuria, frequency or urgency. No Blood in urine  MUSCULOSKELETAL:  no joint aches, no muscle pain  SKIN:  No change in skin, hair or nails.  NEUROLOGIC:  No Headaches, seizures or weakness.  PSYCHIATRIC:  No disorder of thought or mood.  ENDOCRINE:  No heat or cold intolerance  HEMATOLOGICAL:  No easy bruising or bleeding.       Physical Exam:  GEN: NAD, pleasant  HEENT: normocephalic and atraumatic. EOMI. PERRL.  Anicteric   NECK: Supple.   LUNGS: Clear to auscultation.  HEART: Regular rate and rhythm without murmur.  ABDOMEN: Soft, nontender, and nondistended.  Positive bowel sounds.    : No CVA tenderness  EXTREMITIES: Without any edema.  MSK: no joint swelling  NEUROLOGIC: Cranial nerves II through XII are grossly intact. No focal deficits  PSYCHIATRIC: Appropriate affect .  SKIN: No Rash      Vitals:    T(F): 98.9 (04 Feb 2021 15:15), Max: 98.9 (04 Feb 2021 04:00)  HR: 81 (04 Feb 2021 17:00)  BP: 121/65 (04 Feb 2021 17:00)  RR: 21 (04 Feb 2021 17:00)  SpO2: 97% (04 Feb 2021 17:00) (89% - 99%)  temp max in last 48H T(F): , Max: 98.9 (02-04-21 @ 04:00)    Current Antibiotics:  cefepime   IVPB      cefepime   IVPB 2000 milliGRAM(s) IV Intermittent every 8 hours  remdesivir  IVPB 100 milliGRAM(s) IV Intermittent every 24 hours    Other medications:  atorvastatin 10 milliGRAM(s) Oral at bedtime  dextrose 40% Gel 15 Gram(s) Oral once  dextrose 5%. 1000 milliLiter(s) IV Continuous <Continuous>  dextrose 5%. 1000 milliLiter(s) IV Continuous <Continuous>  dextrose 50% Injectable 25 Gram(s) IV Push once  dextrose 50% Injectable 25 Gram(s) IV Push once  enoxaparin Injectable 70 milliGRAM(s) SubCutaneous every 12 hours  ferrous    sulfate 325 milliGRAM(s) Oral daily  glucagon  Injectable 1 milliGRAM(s) IntraMuscular once  insulin glargine Injectable (LANTUS) 25 Unit(s) SubCutaneous at bedtime  insulin lispro (ADMELOG) corrective regimen sliding scale   SubCutaneous at bedtime  insulin lispro (ADMELOG) corrective regimen sliding scale   SubCutaneous three times a day before meals  insulin lispro Injectable (ADMELOG) 12 Unit(s) SubCutaneous three times a day before meals  lidocaine   Patch 1 Patch Transdermal every 24 hours  pantoprazole  Injectable 40 milliGRAM(s) IV Push every 12 hours                            11.6   18.41 )-----------( 274      ( 04 Feb 2021 04:26 )             36.0     02-04    135  |  102  |  37.0<H>  ----------------------------<  189<H>  4.9   |  22.0  |  0.58    Ca    8.6      04 Feb 2021 16:50  Phos  3.0     02-04  Mg     2.2     02-04    TPro  6.5<L>  /  Alb  2.7<L>  /  TBili  0.8  /  DBili  0.3  /  AST  24  /  ALT  14  /  AlkPhos  69  02-04    RECENT CULTURES:  01-25 @ 18:35          [facility]      WBC Count: 18.41 K/uL (02-04-21 @ 04:26)  WBC Count: 18.37 K/uL (02-03-21 @ 08:46)  WBC Count: 16.98 K/uL (02-02-21 @ 13:25)  WBC Count: 17.15 K/uL (02-01-21 @ 09:19)  WBC Count: 14.54 K/uL (01-31-21 @ 07:19)    Creatinine, Serum: 0.58 mg/dL (02-04-21 @ 16:50)  Creatinine, Serum: 0.62 mg/dL (02-04-21 @ 04:26)  Creatinine, Serum: 0.70 mg/dL (02-03-21 @ 08:44)  Creatinine, Serum: 0.72 mg/dL (02-03-21 @ 07:39)  Creatinine, Serum: 0.70 mg/dL (02-03-21 @ 07:39)  Creatinine, Serum: 0.82 mg/dL (02-02-21 @ 13:25)  Creatinine, Serum: 0.89 mg/dL (02-01-21 @ 13:15)  Creatinine, Serum: 0.73 mg/dL (02-01-21 @ 09:19)  Creatinine, Serum: 0.58 mg/dL (01-31-21 @ 07:19)    C-Reactive Protein, Serum: 5.83 mg/dL (01-27-21 @ 21:55)  C-Reactive Protein, Serum: 6.86 mg/dL (01-25-21 @ 16:22)    Ferritin, Serum: 915 ng/mL (01-27-21 @ 21:55)  Ferritin, Serum: 356 ng/mL (01-25-21 @ 16:22)      Procalcitonin, Serum: 0.41 ng/mL (02-03-21 @ 13:48)  Procalcitonin, Serum: 0.27 ng/mL (02-03-21 @ 08:46)  Procalcitonin, Serum: 0.24 ng/mL (02-03-21 @ 07:39)  Procalcitonin, Serum: 0.22 ng/mL (02-02-21 @ 13:25)  Procalcitonin, Serum: 0.21 ng/mL (01-29-21 @ 06:11)  Procalcitonin, Serum: 0.48 ng/mL (01-27-21 @ 21:55)  Procalcitonin, Serum: 0.67 ng/mL (01-25-21 @ 16:22)     Rapid RVP Result: NotDetec (01-25-21 @ 18:35)  SARS-CoV-2: Detected (01-25-21 @ 18:35)

## 2021-02-04 NOTE — PROGRESS NOTE ADULT - ASSESSMENT
61 y/o F w/ a PMH of DMII, HTN came in c/o sob worsening over the past week and associated nausea and NBNB vomiting x 2 days.  Pt was recently diagnosed as an outpt w/ COVID on 1/22 and was told by PCP if her O2 sat went <92 she should go to the hospital.  Pt came to the ED 2 days ago and O2 sat was noted to be >90% and was discharged home.  Pts SOB did not improve and she came back in today.  Pt also reported subjective fevers/chills at home.    COVID 19 + infection  Viral pneumonia  Acute hypoxic respiratory failure  Leukocytosis  LLE DVT and probable PE      - patient with worsening respiratory status now on BIPAP, found to have LLE DVT, likely has PE also contributing to worsening hypoxia  - inflammatory markers are elevated  - D dimer >6000, USG doppler LLE +DVT  - procalcitonin 0.48-->0.27  - WBC elevated; likely reactive  - Remdesivir extended for 10 day course  - Decadron 6mg IV/PO daily while on Remdesivir  - s/p convalescent plasma 1/29  - agree with empiric cefepime x 5-7 days  - agree with full dose AC  - f/u repeat BCX  - Check CBC with diff, CMP with LFT's daily, Inflammatory markers, D dimer, procalcitonin q48h.   - Encourage self proning q2h, incentive spirometry and ambulation as tolerated  - Taper off O2 as tolerated  - Inpatient Contact/Airborne isolation       prognosis guarded  please call with questions

## 2021-02-04 NOTE — PROGRESS NOTE ADULT - SUBJECTIVE AND OBJECTIVE BOX
CCM      call back    :  daughter called    Mi Jolley  [phone]    updated   current    clinical   -  failed  high flow  now on bipap -      still critical    condition

## 2021-02-04 NOTE — PROGRESS NOTE ADULT - SUBJECTIVE AND OBJECTIVE BOX
HPI: 59 yo female, PMHx HTN, T2DM, HLD, who presented with vomiting and fever, diagnosed with COVID-19 on 1/22. Admitted with acute hypoxemic respiratory failure secondary to COVID-19 viral pneumonia on 1/25. Initial CTA negative for PE, and initial LE duplex negative for DVT. She was started on Remdesivir and IV Decadron. Found to have acute LLE DVT on 2/2, started on full dose Lovenox. TTE gI dHFpEF without acute cor pulmonale. RRT 2/3 with acute hypoxemic respiratory failure, started on BiPAP, remained hypoxic converted to CPAP and transferred to ICU for further care.      Remains on CPAP 14 FiO2 100%  Attempted transition to HFNC/NRB, quickly became hypoxic and was started back on CPAP           T(C): 36.7 (02-04-21 @ 13:31), Max: 37.2 (02-04-21 @ 04:00)  HR: 78 (02-04-21 @ 15:00) (77 - 120)  BP: 119/59 (02-04-21 @ 15:00) (104/62 - 136/59)  RR: 18 (02-04-21 @ 15:00) (18 - 26)  SpO2: 99% (02-04-21 @ 15:00) (89% - 99%)          02-03-21 @ 07:01  -  02-04-21 @ 07:00  --------------------------------------------------------  IN: 400 mL / OUT: 1050 mL / NET: -650 mL    02-04-21 @ 07:01  -  02-04-21 @ 15:34  --------------------------------------------------------  IN: 50 mL / OUT: 300 mL / NET: -250 mL                Vital signs reviewed and physical exam performed where pertinent and urgently required.    Lab/radiology studies/ABG/meds reviewed and interpreted into the assessment and treatment plan.        Assessment/Plan/Therapeutic interventions:    59 yo female, PMHx HTN, T2DM, HLD, diagnosed with COVID-19 on 1/22, admitted with acute hypoxemic respiratory failure secondary to COVID-19 viral pneumonia on 1/25, with acute LLE DVT.      CPAP augmented to maintain SpO2 >90%  Continue Remdesivir and IV steroids   Start low dose Lorazepam PRN for anxiety and compliance with CPAP, may need Precedex gtt  Full anticoagulation for confirmed LE DVT with possible PE  On broad-spectrum antibiotics for possible superimposed Gram negative healthcare associated pneumonia   High risk for necessitating intubation          COVID 19 specific considerations and therapeutic options based on the available and rapidly changing literature.    30 minutes of critical care time spent in the management of this critically ill COVID-19 patient suffering from multisystem organ failure with continuous assessments and interventions based on the interpretation of multiple databases. Critical care time not inclusive of independent time spent on procedures performed.

## 2021-02-05 NOTE — PROGRESS NOTE ADULT - SUBJECTIVE AND OBJECTIVE BOX
On Admission  21 (12d)  HPI:  61 y/o F w/ a PMH of DMII, HTN came in c/o sob worsening over the past week and associated naussea and NBNB vomiting x 2 days.  Pt was recently diagnosed as an outpt w/ COVID on  and was told by PCP if her O2 sat went <92 she should go to the hospital.  Pt came to the ED 2 days ago and O2 sat was noted to be >90% and was discharged home.  Pts SOB did not improve and she came back in today.  Pt also reports subjective fevers/chills at home but denies cp, palpitations, abd pain, lower extremity swelling/pain. (2021 18:19)    PAST MEDICAL & SURGICAL HISTORY:  Hyperlipidemia    Liver disease  (unable to take tylenol )    Hypertension    Diabetes    Diverticulitis    High cholesterol    HTN (hypertension)    DM (diabetes mellitus)    H/O:         Antimicrobial:  cefepime   IVPB      cefepime   IVPB 2000 milliGRAM(s) IV Intermittent every 8 hours    Cardiovascular:    Pulmonary:  benzonatate 100 milliGRAM(s) Oral every 8 hours PRN    Hematalogic:  enoxaparin Injectable 70 milliGRAM(s) SubCutaneous every 12 hours    Other:  acetaminophen   Tablet .. 650 milliGRAM(s) Oral every 6 hours PRN  aluminum hydroxide/magnesium hydroxide/simethicone Suspension 30 milliLiter(s) Oral every 6 hours PRN  atorvastatin 10 milliGRAM(s) Oral at bedtime  dexMEDEtomidine Infusion 0.5 MICROgram(s)/kG/Hr IV Continuous <Continuous>  dextrose 40% Gel 15 Gram(s) Oral once  dextrose 5%. 1000 milliLiter(s) IV Continuous <Continuous>  dextrose 5%. 1000 milliLiter(s) IV Continuous <Continuous>  dextrose 50% Injectable 25 Gram(s) IV Push once  dextrose 50% Injectable 25 Gram(s) IV Push once  ferrous    sulfate 325 milliGRAM(s) Oral daily  glucagon  Injectable 1 milliGRAM(s) IntraMuscular once  insulin lispro (ADMELOG) corrective regimen sliding scale   SubCutaneous at bedtime  insulin lispro (ADMELOG) corrective regimen sliding scale   SubCutaneous three times a day before meals  insulin lispro Injectable (ADMELOG) 12 Unit(s) SubCutaneous three times a day before meals  lidocaine   Patch 1 Patch Transdermal every 24 hours  LORazepam   Injectable 0.5 milliGRAM(s) IV Push every 4 hours PRN  morphine  - Injectable 2 milliGRAM(s) IV Push every 4 hours PRN  pantoprazole  Injectable 40 milliGRAM(s) IV Push every 12 hours      Drug Dosing Weight  Height (cm): 167.6 (2021 15:31)  Weight (kg): 72.6 (2021 15:31)  BMI (kg/m2): 25.8 (2021 15:31)  BSA (m2): 1.82 (2021 15:31)    T(C): 36.5 (21 @ 08:01), Max: 37.9 (21 @ 00:00)  HR: 75 (21 @ 09:00)  BP: 86/55 (21 @ 09:12)  BP(mean): 60 (21 @ 09:12)  ABP: --  ABP(mean): --  RR: 26 (21 @ 09:00)  SpO2: 93% (21 @ 09:00)    ABG - ( 2021 05:04 )  pH, Arterial: 7.39  pH, Blood: x     /  pCO2: 42    /  pO2: 68    / HCO3: 25    / Base Excess: 0.3   /  SaO2: 94                     @ 07:01  -   @ 07:00  --------------------------------------------------------  IN: 1371.2 mL / OUT: 700 mL / NET: 671.2 mL              LABS:  CBC Full  -  ( 2021 06:05 )  WBC Count : 18.23 K/uL  RBC Count : 3.12 M/uL  Hemoglobin : 9.4 g/dL  Hematocrit : 29.0 %  Platelet Count - Automated : 253 K/uL  Mean Cell Volume : 92.9 fl  Mean Cell Hemoglobin : 30.1 pg  Mean Cell Hemoglobin Concentration : 32.4 gm/dL  Auto Neutrophil # : 15.23 K/uL  Auto Lymphocyte # : 1.10 K/uL  Auto Monocyte # : 0.67 K/uL  Auto Eosinophil # : 0.33 K/uL  Auto Basophil # : 0.05 K/uL  Auto Neutrophil % : 83.5 %  Auto Lymphocyte % : 6.0 %  Auto Monocyte % : 3.7 %  Auto Eosinophil % : 1.8 %  Auto Basophil % : 0.3 %        132<L>  |  103  |  45.0<H>  ----------------------------<  137<H>  4.7   |  19.0<L>  |  0.70    Ca    8.1<L>      2021 06:05  Phos  3.8       Mg     2.3         TPro  6.0<L>  /  Alb  2.2<L>  /  TBili  1.0  /  DBili  0.3  /  AST  31  /  ALT  12  /  AlkPhos  68  06        Culture Results:   No growth at 48 hours ( @ 13:48)  Culture Results:   No growth at 48 hours ( @ 10:04)    ____________________________________________________________________________________________________

## 2021-02-05 NOTE — PROGRESS NOTE ADULT - ASSESSMENT
HPI/INTERVAL EVENTS: tolerated several hours of high flow today    EXAM:   NAD  perrl  reg rhythm  bilat air entry  abd soft nt  trace edema    cxr- severe bilat interstitial and airspace opacities     IMPRESSION/ASSESSMENT & PLAN:   61 yo female with DM, HTN, admitted 1/25 with covid 19 viral pneumonia, now with acute respiratory failure requiring bipap.  CTA chest and LE doppler negative for VTE  Completed decadron and remdesivir.  - continue to try to wean from bipap  - 5 day course of cefepime for possible superimposed bacterial infection  - full dose AC, ddimer elevated   prognosis guarded HPI/INTERVAL EVENTS: tolerated several hours of high flow today    EXAM:   NAD  perrl  reg rhythm  bilat air entry  abd soft nt  trace edema    cxr- severe bilat interstitial and airspace opacities     IMPRESSION/ASSESSMENT & PLAN:   59 yo female with DM, HTN, admitted 1/25 with covid 19 viral pneumonia, now with acute respiratory failure requiring bipap.  Has LLE DVT  Completed decadron and remdesivir.  - continue to try to wean from bipap  - 5 day course of cefepime for possible superimposed bacterial infection  - full dose AC, ddimer elevated   prognosis guarded

## 2021-02-05 NOTE — CHART NOTE - NSCHARTNOTEFT_GEN_A_CORE
Source: Patient [ ]  Family [ ]   other [ x]    Current Diet: Diet, Consistent Carbohydrate w/Evening Snack (01-25-21 @ 17:51)    Patient reports [ ] nausea  [ ] vomiting [ ] diarrhea [ ] constipation  [ ]chewing problems [ ] swallowing issues  [ ] other:     PO intake:  NPO while on BiPAP    Current Weight:   (1/25) 160 lbs  No new weight  No edema documented    Pertinent Medications: MEDICATIONS  (STANDING):  atorvastatin 10 milliGRAM(s) Oral at bedtime  cefepime   IVPB      cefepime   IVPB 2000 milliGRAM(s) IV Intermittent every 8 hours  dextrose 40% Gel 15 Gram(s) Oral once  dextrose 5%. 1000 milliLiter(s) (50 mL/Hr) IV Continuous <Continuous>  dextrose 5%. 1000 milliLiter(s) (100 mL/Hr) IV Continuous <Continuous>  dextrose 50% Injectable 25 Gram(s) IV Push once  dextrose 50% Injectable 25 Gram(s) IV Push once  enoxaparin Injectable 70 milliGRAM(s) SubCutaneous every 12 hours  ferrous    sulfate 325 milliGRAM(s) Oral daily  glucagon  Injectable 1 milliGRAM(s) IntraMuscular once  insulin glargine Injectable (LANTUS) 25 Unit(s) SubCutaneous at bedtime  insulin lispro (ADMELOG) corrective regimen sliding scale   SubCutaneous at bedtime  insulin lispro (ADMELOG) corrective regimen sliding scale   SubCutaneous three times a day before meals  insulin lispro Injectable (ADMELOG) 12 Unit(s) SubCutaneous three times a day before meals  lidocaine   Patch 1 Patch Transdermal every 24 hours  pantoprazole  Injectable 40 milliGRAM(s) IV Push every 12 hours  remdesivir  IVPB 100 milliGRAM(s) IV Intermittent every 24 hours    MEDICATIONS  (PRN):  acetaminophen   Tablet .. 650 milliGRAM(s) Oral every 6 hours PRN Temp greater or equal to 38C (100.4F), Mild Pain (1 - 3)  aluminum hydroxide/magnesium hydroxide/simethicone Suspension 30 milliLiter(s) Oral every 6 hours PRN Dyspepsia  benzonatate 100 milliGRAM(s) Oral every 8 hours PRN Cough  LORazepam   Injectable 0.5 milliGRAM(s) IV Push every 4 hours PRN Anxiety  morphine  - Injectable 2 milliGRAM(s) IV Push every 4 hours PRN Dyspnea or Mild Pain 1-3    Pertinent Labs: CBC Full  -  ( 05 Feb 2021 04:53 )  WBC Count : 16.77 K/uL  RBC Count : 3.44 M/uL  Hemoglobin : 10.2 g/dL  Hematocrit : 32.2 %  Platelet Count - Automated : 276 K/uL  Mean Cell Volume : 93.6 fl  Mean Cell Hemoglobin : 29.7 pg  Mean Cell Hemoglobin Concentration : 31.7 gm/dL  Auto Neutrophil # : 14.12 K/uL  Auto Lymphocyte # : 0.93 K/uL  Auto Monocyte # : 0.78 K/uL  Auto Eosinophil # : 0.15 K/uL  Auto Basophil # : 0.04 K/uL  Auto Neutrophil % : 84.2 %  Auto Lymphocyte % : 5.5 %  Auto Monocyte % : 4.7 %  Auto Eosinophil % : 0.9 %  Auto Basophil % : 0.2 %    02-05 Na136 mmol/L Glu 112 mg/dL<H> K+ 4.8 mmol/L Cr  0.55 mg/dL BUN 45.0 mg/dL<H> Phos 3.6 mg/dL Alb 2.6 g/dL<L> PAB n/a       Skin: Intact per documentation    Nutrition focused physical exam not conducted at this time due to COVID isolation precautions - found signs of malnutrition [ ]absent [ ]present    Subcutaneous fat loss: [ ] Orbital fat pads region, [ ]Buccal fat region, [ ]Triceps region,  [ ]Ribs region    Muscle wasting: [ ]Temples region, [ ]Clavicle region, [ ]Shoulder region, [ ]Scapula region, [ ]Interosseous region,  [ ]thigh region, [ ]Calf region    Estimated Needs:   [x ] no change since previous assessment  [ ] recalculated:     Current Nutrition Diagnosis: Pt remains at nutrition risk secondary to increased nutrient needs related to increased physiological demand to maintain nutrition status as evidenced by hypoxemic respiratory failure secondary to COVID-19 viral pneumonia and acute LLE DVT. Pt now on continuous BiPAP, NPO. Pt remains at high risk for malnutrition. RD to follow up.    Recommendations:   1) Resume po diet as medically feasible/tolerable.  2) Rx: MVI, vitamin C, and vitamin C supplementation as feasible.  3) Obtain daily weights to monitor trends.     Monitoring and Evaluation:   [ ] PO intake [ ] Tolerance to diet prescription [X] Weights  [X] Follow up per protocol [X] Labs

## 2021-02-06 NOTE — PROGRESS NOTE ADULT - ASSESSMENT
HPI/INTERVAL EVENTS: pulled off bipap mask overnight and desaturated    EXAM:   on precedex, lethargic but arousable  perrl  reg rhythm  bilat air entry  abd soft nt  trace edema    cxr- severe bilat interstitial and airspace opacities     IMPRESSION/ASSESSMENT & PLAN:   59 yo female with DM, HTN, admitted 1/25 with covid 19 viral pneumonia, now with acute respiratory failure requiring bipap.  CTA chest and LE doppler negative for VTE  Completed decadron and remdesivir.  - continue to try to wean from bipap  - 5 day course of cefepime for possible superimposed bacterial infection  - full dose AC, ddimer elevated   prognosis guarded HPI/INTERVAL EVENTS: pulled off bipap mask overnight and desaturated    EXAM:   on precedex, lethargic but arousable  perrl  reg rhythm  bilat air entry  abd soft nt  trace edema    cxr- severe bilat interstitial and airspace opacities     IMPRESSION/ASSESSMENT & PLAN:   61 yo female with DM, HTN, admitted 1/25 with covid 19 viral pneumonia, now with acute respiratory failure requiring bipap.  Has LLE DVT  Completed decadron and remdesivir.  - continue to try to wean from bipap  - 5 day course of cefepime for possible superimposed bacterial infection  - full dose AC, ddimer elevated   prognosis guarded HPI/INTERVAL EVENTS: pulled off bipap mask overnight and desaturated    EXAM:   on precedex, lethargic but arousable  perrl  reg rhythm  bilat air entry  abd soft nt  trace edema    cxr- severe bilat interstitial and airspace opacities     IMPRESSION/ASSESSMENT & PLAN:   59 yo female with DM, HTN, admitted 1/25 with covid 19 viral pneumonia, now with acute respiratory failure requiring bipap.  Has LLE DVT  Completed decadron and remdesivir.  - continue to try to wean from bipap  - 5 day course of cefepime for possible superimposed bacterial infection  - full dose AC, ddimer elevated   prognosis guarded, daughter updated by phone HPI/INTERVAL EVENTS: pulled off bipap mask overnight and desaturated    EXAM:   on precedex, lethargic but arousable  perrl  reg rhythm  bilat air entry  abd soft nt  trace edema    cxr- severe bilat interstitial and airspace opacities     IMPRESSION/ASSESSMENT & PLAN:   59 yo female with DM, HTN, admitted 1/25 with covid 19 viral pneumonia, now with acute respiratory failure requiring bipap.  Has LLE DVT  Completed decadron and remdesivir.  - continue to try to wean from bipap  - 5 day course of cefepime for possible superimposed bacterial infection  - full dose AC, ddimer elevated   May require intubation, tenuous respiratory status  prognosis guarded, daughter updated by phone

## 2021-02-06 NOTE — PROGRESS NOTE ADULT - SUBJECTIVE AND OBJECTIVE BOX
On Admission  21 (12d)  HPI:  59 y/o F w/ a PMH of DMII, HTN came in c/o sob worsening over the past week and associated naussea and NBNB vomiting x 2 days.  Pt was recently diagnosed as an outpt w/ COVID on  and was told by PCP if her O2 sat went <92 she should go to the hospital.  Pt came to the ED 2 days ago and O2 sat was noted to be >90% and was discharged home.  Pts SOB did not improve and she came back in today.  Pt also reports subjective fevers/chills at home but denies cp, palpitations, abd pain, lower extremity swelling/pain. (2021 18:19)    PAST MEDICAL & SURGICAL HISTORY:  Hyperlipidemia    Liver disease  (unable to take tylenol )    Hypertension    Diabetes    Diverticulitis    High cholesterol    HTN (hypertension)    DM (diabetes mellitus)    H/O:         Antimicrobial:  cefepime   IVPB      cefepime   IVPB 2000 milliGRAM(s) IV Intermittent every 8 hours    Cardiovascular:    Pulmonary:  benzonatate 100 milliGRAM(s) Oral every 8 hours PRN    Hematalogic:  enoxaparin Injectable 70 milliGRAM(s) SubCutaneous every 12 hours    Other:  acetaminophen   Tablet .. 650 milliGRAM(s) Oral every 6 hours PRN  aluminum hydroxide/magnesium hydroxide/simethicone Suspension 30 milliLiter(s) Oral every 6 hours PRN  atorvastatin 10 milliGRAM(s) Oral at bedtime  dexMEDEtomidine Infusion 0.5 MICROgram(s)/kG/Hr IV Continuous <Continuous>  dextrose 40% Gel 15 Gram(s) Oral once  dextrose 5%. 1000 milliLiter(s) IV Continuous <Continuous>  dextrose 5%. 1000 milliLiter(s) IV Continuous <Continuous>  dextrose 50% Injectable 25 Gram(s) IV Push once  dextrose 50% Injectable 25 Gram(s) IV Push once  ferrous    sulfate 325 milliGRAM(s) Oral daily  glucagon  Injectable 1 milliGRAM(s) IntraMuscular once  insulin lispro (ADMELOG) corrective regimen sliding scale   SubCutaneous at bedtime  insulin lispro (ADMELOG) corrective regimen sliding scale   SubCutaneous three times a day before meals  insulin lispro Injectable (ADMELOG) 12 Unit(s) SubCutaneous three times a day before meals  lidocaine   Patch 1 Patch Transdermal every 24 hours  LORazepam   Injectable 0.5 milliGRAM(s) IV Push every 4 hours PRN  morphine  - Injectable 2 milliGRAM(s) IV Push every 4 hours PRN  pantoprazole  Injectable 40 milliGRAM(s) IV Push every 12 hours      Drug Dosing Weight  Height (cm): 167.6 (2021 15:31)  Weight (kg): 72.6 (2021 15:31)  BMI (kg/m2): 25.8 (2021 15:31)  BSA (m2): 1.82 (2021 15:31)    T(C): 36.5 (21 @ 08:01), Max: 37.9 (21 @ 00:00)  HR: 75 (21 @ 09:00)  BP: 86/49 (21 @ 09:00)  BP(mean): 58 (21 @ 09:00)  ABP: --  ABP(mean): --  RR: 26 (21 @ 09:00)  SpO2: 93% (21 @ 09:00)    ABG - ( 2021 05:04 )  pH, Arterial: 7.39  pH, Blood: x     /  pCO2: 42    /  pO2: 68    / HCO3: 25    / Base Excess: 0.3   /  SaO2: 94                     @ 07:01  -   @ 07:00  --------------------------------------------------------  IN: 1342 mL / OUT: 700 mL / NET: 642 mL              LABS:  CBC Full  -  ( 2021 06:05 )  WBC Count : 18.23 K/uL  RBC Count : 3.12 M/uL  Hemoglobin : 9.4 g/dL  Hematocrit : 29.0 %  Platelet Count - Automated : 253 K/uL  Mean Cell Volume : 92.9 fl  Mean Cell Hemoglobin : 30.1 pg  Mean Cell Hemoglobin Concentration : 32.4 gm/dL  Auto Neutrophil # : 15.23 K/uL  Auto Lymphocyte # : 1.10 K/uL  Auto Monocyte # : 0.67 K/uL  Auto Eosinophil # : 0.33 K/uL  Auto Basophil # : 0.05 K/uL  Auto Neutrophil % : 83.5 %  Auto Lymphocyte % : 6.0 %  Auto Monocyte % : 3.7 %  Auto Eosinophil % : 1.8 %  Auto Basophil % : 0.3 %        132<L>  |  103  |  45.0<H>  ----------------------------<  137<H>  4.7   |  19.0<L>  |  0.70    Ca    8.1<L>      2021 06:05  Phos  3.8       Mg     2.3         TPro  6.0<L>  /  Alb  2.2<L>  /  TBili  1.0  /  DBili  0.3  /  AST  31  /  ALT  12  /  AlkPhos  68          Culture Results:   No growth at 48 hours ( @ 13:48)  Culture Results:   No growth at 48 hours ( @ 10:04)    ____________________________________________________________________________________________________

## 2021-02-07 NOTE — PROGRESS NOTE ADULT - ASSESSMENT
intubated yesterday    EXAM:   intubated and sedated  perrl  reg rhythm  bilat air entry  abd soft nt  trace edema    cxr- severe bilat interstitial and airspace opacities     IMPRESSION/ASSESSMENT & PLAN:   59 yo female with DM, HTN, admitted 1/25 with covid 19 viral pneumonia, now with acute respiratory failure/ARDS.  Has LLE DVT  Mild JER  Completed decadron and remdesivir.  - low vt ventilation  - 5 day course of cefepime for possible superimposed bacterial infection  - full dose AC, ddimer elevated   - may require prone position   prognosis guarded, daughter Mi updated by phone

## 2021-02-07 NOTE — PROGRESS NOTE ADULT - SUBJECTIVE AND OBJECTIVE BOX
On Admission  21 (13d)  HPI:  59 y/o F w/ a PMH of DMII, HTN came in c/o sob worsening over the past week and associated naussea and NBNB vomiting x 2 days.  Pt was recently diagnosed as an outpt w/ COVID on  and was told by PCP if her O2 sat went <92 she should go to the hospital.  Pt came to the ED 2 days ago and O2 sat was noted to be >90% and was discharged home.  Pts SOB did not improve and she came back in today.  Pt also reports subjective fevers/chills at home but denies cp, palpitations, abd pain, lower extremity swelling/pain. (2021 18:19)    PAST MEDICAL & SURGICAL HISTORY:  Hyperlipidemia    Liver disease  (unable to take tylenol )    Hypertension    Diabetes    Diverticulitis    High cholesterol    HTN (hypertension)    DM (diabetes mellitus)    H/O:         Antimicrobial:  cefepime   IVPB      cefepime   IVPB 2000 milliGRAM(s) IV Intermittent every 8 hours    Cardiovascular:  norepinephrine Infusion 0.02 MICROgram(s)/kG/Min IV Continuous <Continuous>    Pulmonary:  benzonatate 100 milliGRAM(s) Oral every 8 hours PRN    Hematalogic:  enoxaparin Injectable 70 milliGRAM(s) SubCutaneous every 12 hours    Other:  acetaminophen   Tablet .. 650 milliGRAM(s) Oral every 6 hours PRN  aluminum hydroxide/magnesium hydroxide/simethicone Suspension 30 milliLiter(s) Oral every 6 hours PRN  atorvastatin 10 milliGRAM(s) Oral at bedtime  chlorhexidine 0.12% Liquid 15 milliLiter(s) Oral Mucosa every 12 hours  dextrose 40% Gel 15 Gram(s) Oral once  dextrose 5%. 1000 milliLiter(s) IV Continuous <Continuous>  dextrose 5%. 1000 milliLiter(s) IV Continuous <Continuous>  dextrose 50% Injectable 25 Gram(s) IV Push once  dextrose 50% Injectable 25 Gram(s) IV Push once  fentaNYL   Infusion 0.5 MICROgram(s)/kG/Hr IV Continuous <Continuous>  ferrous    sulfate 325 milliGRAM(s) Oral daily  glucagon  Injectable 1 milliGRAM(s) IntraMuscular once  insulin lispro (ADMELOG) corrective regimen sliding scale   SubCutaneous three times a day before meals  insulin lispro (ADMELOG) corrective regimen sliding scale   SubCutaneous at bedtime  insulin lispro Injectable (ADMELOG) 12 Unit(s) SubCutaneous three times a day before meals  lidocaine   Patch 1 Patch Transdermal every 24 hours  LORazepam   Injectable 0.5 milliGRAM(s) IV Push every 4 hours PRN  morphine  - Injectable 2 milliGRAM(s) IV Push every 4 hours PRN  pantoprazole  Injectable 40 milliGRAM(s) IV Push every 12 hours  propofol Infusion 10 MICROgram(s)/kG/Min IV Continuous <Continuous>      Drug Dosing Weight  Height (cm): 167.6 (2021 15:31)  Weight (kg): 72.6 (2021 15:31)  BMI (kg/m2): 25.8 (2021 15:31)  BSA (m2): 1.82 (2021 15:31)    T(C): 37.4 (21 @ 15:00), Max: 38.2 (21 @ 04:00)  HR: 118 (21 @ 15:00)  BP: 93/58 (21 @ 19:00)  BP(mean): 67 (21 @ 19:00)  ABP: 111/53 (21 @ 15:00)  ABP(mean): 69 (21 @ 15:00)  RR: 30 (21 @ 15:00)  SpO2: 98% (21 @ 15:00)    ABG - ( 2021 09:43 )  pH, Arterial: 7.23  pH, Blood: x     /  pCO2: 42    /  pO2: 102   / HCO3: 17    / Base Excess: -9.7  /  SaO2: 98                     @ 07:01  -   @ 07:00  --------------------------------------------------------  IN: 1181.9 mL / OUT: 970 mL / NET: 211.9 mL        Mode: AC/ CMV (Assist Control/ Continuous Mandatory Ventilation)  RR (machine): 32  TV (machine): 420  FiO2: 100  PEEP: 18  PS: 70  MAP: 29  PIP: 38        LABS:  CBC Full  -  ( 2021 03:59 )  WBC Count : 26.55 K/uL  RBC Count : 3.25 M/uL  Hemoglobin : 9.7 g/dL  Hematocrit : 30.8 %  Platelet Count - Automated : 467 K/uL  Mean Cell Volume : 94.8 fl  Mean Cell Hemoglobin : 29.8 pg  Mean Cell Hemoglobin Concentration : 31.5 gm/dL  Auto Neutrophil # : 24.19 K/uL  Auto Lymphocyte # : 1.41 K/uL  Auto Monocyte # : 0.72 K/uL  Auto Eosinophil # : 0.24 K/uL  Auto Basophil # : 0.00 K/uL  Auto Neutrophil % : 89.3 %  Auto Lymphocyte % : 5.3 %  Auto Monocyte % : 2.7 %  Auto Eosinophil % : 0.9 %  Auto Basophil % : 0.0 %        137  |  106  |  61.0<H>  ----------------------------<  147<H>  4.6   |  18.0<L>  |  1.12    Ca    8.4<L>      2021 03:59  Phos  4.2     02-  Mg     2.5     -    TPro  6.3<L>  /  Alb  2.2<L>  /  TBili  0.6  /  DBili  0.4<H>  /  AST  25  /  ALT  11  /  AlkPhos  68  -          ____________________________________________________________________________________________________

## 2021-02-08 NOTE — CONSULT NOTE ADULT - ASSESSMENT
1) ATN  2) Respiratory failure  3) Hypotension  4) Anemia    Making urine;  SCr worsening; in setting of CoVID; hemodynamically mediated;  On pressor support  Will hold off on RRT at current given stable electrolytes; UOP;  Cho  Closely monitor IO  May require in 24-48 hours if worsens    dw Dr Haddad

## 2021-02-08 NOTE — PROGRESS NOTE ADULT - SUBJECTIVE AND OBJECTIVE BOX
HPI: 61 yo female, PMHx HTN, T2DM, HLD, who presented with vomiting and fever, diagnosed with COVID-19 on 1/22. Admitted with acute hypoxemic respiratory failure secondary to COVID-19 viral pneumonia on 1/25. Initial CTA negative for PE, and initial LE duplex negative for DVT. She was started on Remdesivir and IV Decadron. Found to have acute LLE DVT on 2/2, started on full dose Lovenox. TTE gI dHFpEF without acute cor pulmonale. RRT 2/3 with acute hypoxemic respiratory failure, started on BiPAP, remained hypoxic converted to CPAP and transferred to ICU for further care. Developed worsening respiratory failure, severe ARDS and was intubated 2/6.      Hosp day #15  Vent day #3    Received call from RN that patient had escalating pressor requirement overnight, now on maximum dose Norepinephrine and still unable to achieve MAP goal  [place]  ABG pending        T(C): 36.6 (02-08-21 @ 07:55), Max: 37.6 (02-07-21 @ 11:00)  HR: 106 (02-08-21 @ 06:00) (106 - 122)  BP: --  RR: 30 (02-08-21 @ 06:00) (30 - 32)  SpO2: 99% (02-08-21 @ 06:00) (93% - 100%)          02-07-21 @ 07:01  -  02-08-21 @ 07:00  --------------------------------------------------------  IN: 2503.4 mL / OUT: 69 mL / NET: 2434.4 mL        Mode: AC/ CMV (Assist Control/ Continuous Mandatory Ventilation), RR (machine): 30, TV (machine): 400, FiO2: 60, PEEP: 18, PS: 70, MAP: 26, PIP: 37        Vital signs reviewed and physical exam performed where pertinent and urgently required.    Lab/radiology studies/ABG/meds reviewed and interpreted into the assessment and treatment plan.        Assessment/Plan/Therapeutic interventions:    Neuro: Deep sedation and neuromuscular blockade as needed to facilitate ventilation and improve compliance to optimize oxygenation    CV: Vasopressor support titrating to maintain MAP >65. Suspect underlying acidemia, check STAT ABG, give 3 amps of bicarb and start bicarb gtt. Add Vasopressin. Monitoring end points of perfusion.    Pulm: Low Tv lung protective strategy 4-8 cc/kg IBW. Manipulating vent to maintain paO2 55-80 and pH >7.15 with permissive hypercapnea. Too unstable from hemodynamic standpoint for prone positioning.    GI: Stress ulcer GI ppx.    Renal: New [place]ER, start bicarb as above, Nephro consult placed may need CRRT     Heme: Full dose Lovenox     ID: Empiric abx with Cefepime for possible superimposed Gram negative bacterial pneumonia    Endo: Aggressive glycemic control to keep FS glucose to <180mg/dl while critically ill.           COVID 19 specific considerations and therapeutic options based on the available and rapidly changing literature.    47 minutes of critical care time spent in the management of this critically ill COVID-19 patient suffering from multisystem organ failure with continuous assessments and interventions based on the interpretation of multiple databases. Critical care time not inclusive of independent time spent on procedures performed.

## 2021-02-08 NOTE — CONSULT NOTE ADULT - SUBJECTIVE AND OBJECTIVE BOX
[facility] DIVISION OF KIDNEY DISEASES AND HYPERTENSION -- INITIAL CONSULT NOTE  --------------------------------------------------------------------------------  HPI:  61 y/o F w/ a PMH of DMII, HTN came in c/o sob worsening over the past week and associated naussea and NBNB vomiting x 2 days.  Pt was recently diagnosed as an outpt w/ COVID on  and was told by PCP if her O2 sat went <92 she should go to the hospital.  Pt came to the ED 2 days ago and O2 sat was noted to be >90% and was discharged home.  Pts SOB did not improve and she came back in today.  Pt also reports subjective fevers/chills at home but denies cp, palpitations, abd pain, lower extremity swelling/pain.   Seen/examined in CoVID unit this AM;        PAST HISTORY  --------------------------------------------------------------------------------  PAST MEDICAL & SURGICAL HISTORY:  Hyperlipidemia    Liver disease  (unable to take tylenol )    Hypertension    Diabetes    Diverticulitis    High cholesterol    HTN (hypertension)    DM (diabetes mellitus)    H/O:       FAMILY HISTORY:  No pertinent family history in first degree relatives    No pertinent family history in first degree relatives      PAST SOCIAL HISTORY:    ALLERGIES & MEDICATIONS  --------------------------------------------------------------------------------  Allergies    Allergy Status Unknown  No Known Allergies    Intolerances      Standing Inpatient Medications  cefepime   IVPB      cefepime   IVPB 2000 milliGRAM(s) IV Intermittent every 8 hours  chlorhexidine 0.12% Liquid 15 milliLiter(s) Oral Mucosa every 12 hours  cisatracurium Infusion 3 MICROgram(s)/kG/Min IV Continuous <Continuous>  dextrose 40% Gel 15 Gram(s) Oral once  dextrose 5%. 1000 milliLiter(s) IV Continuous <Continuous>  dextrose 5%. 1000 milliLiter(s) IV Continuous <Continuous>  dextrose 50% Injectable 25 Gram(s) IV Push once  dextrose 50% Injectable 25 Gram(s) IV Push once  fentaNYL   Infusion 0.5 MICROgram(s)/kG/Hr IV Continuous <Continuous>  glucagon  Injectable 1 milliGRAM(s) IntraMuscular once  heparin  Infusion.  Unit(s)/Hr IV Continuous <Continuous>  insulin glargine Injectable (LANTUS) 15 Unit(s) SubCutaneous every morning  insulin lispro (ADMELOG) corrective regimen sliding scale   SubCutaneous every 6 hours  insulin lispro Injectable (ADMELOG) 4 Unit(s) SubCutaneous every 6 hours  norepinephrine Infusion 0.02 MICROgram(s)/kG/Min IV Continuous <Continuous>  pantoprazole  Injectable 40 milliGRAM(s) IV Push every 12 hours  propofol Infusion 10 MICROgram(s)/kG/Min IV Continuous <Continuous>  sodium bicarbonate  Infusion 0.31 mEq/kG/Hr IV Continuous <Continuous>  vasopressin Infusion 0.04 Unit(s)/Min IV Continuous <Continuous>    PRN Inpatient Medications  acetaminophen   Tablet .. 650 milliGRAM(s) Oral every 6 hours PRN  heparin   Injectable 6000 Unit(s) IV Push every 6 hours PRN  heparin   Injectable 3000 Unit(s) IV Push every 6 hours PRN      REVIEW OF SYSTEMS  --------------------------------------------------------------------------------  Unable to obtain    VITALS/PHYSICAL EXAM  --------------------------------------------------------------------------------  T(C): 36.8 (21 @ 11:52), Max: 37.4 (21 @ 15:00)  HR: 101 (21 @ 12:14) (101 - 123)  BP: --  RR: 46 (21 @ 12:00) (30 - 46)  SpO2: 96% (21 @ 12:14) (92% - 100%)  Wt(kg): --        21 @ 07:01  -  21 @ 07:00  --------------------------------------------------------  IN: 2503.4 mL / OUT: 69 mL / NET: 2434.4 mL    21 @ 07:01  -  21 @ 13:07  --------------------------------------------------------  IN: 3149.4 mL / OUT: 305 mL / NET: 2844.4 mL      Physical EXAM:   intubated and sedated  perrl  reg rhythm  bilat air entry  abd soft nt  trace edema    LABS/STUDIES  --------------------------------------------------------------------------------              7.7    21.81 >-----------<  353      [21 @ 09:16]              25.1     142  |  108  |  82.0  ----------------------------<  231      [21 @ 09:16]  4.7   |  20.0  |  3.32        Ca     7.4     [21 @ 09:16]      Mg     2.9     [21 @ 09:16]      Phos  6.0     [21 @ 09:16]    TPro  5.5  /  Alb  1.7  /  TBili  0.4  /  DBili  x   /  AST  31  /  ALT  10  /  AlkPhos  79  [21 @ 09:16]    PT/INR: PT 14.6 , INR 1.27       [21 @ 10:36]  PTT: 51.6       [21 @ 10:36]      Creatinine Trend:  SCr 3.32 [ @ 09:16]  SCr 3.35 [ 03:40]  SCr 1.12 [ @ 03:59]  SCr 0.70 [ @ 06:05]  SCr 0.55 [ @ 04:53]    Urinalysis - [21 @ 20:13]      Color Yellow / Appearance Slightly Turbid / SG 1.015 / pH 6.0      Gluc 250 / Ketone Negative  / Bili Negative / Urobili Negative       Blood Small / Protein 30 / Leuk Est Negative / Nitrite Negative      RBC 0-2 / WBC 3-5 / Hyaline  / Gran  / Sq Epi  / Non Sq Epi Occasional / Bacteria Many      Iron 16, TIBC 320, %sat 5      [21 @ 07:57]  Ferritin 915      [21 @ 21:55]    HCV 0.08, Nonreact      [21 @ 13:28]

## 2021-02-08 NOTE — PROGRESS NOTE ADULT - ATTENDING COMMENTS
Overnight with increasing pressor requirements, renal failure    EXAM:   intubated and sedated  reg rhythm  reg rhythm  bilat air entry  abd soft nt  trace edema    cxr- severe bilat interstitial and airspace opacities     IMPRESSION/ASSESSMENT & PLAN:   59 yo female with DM, HTN, admitted 1/25 with covid 19 viral pneumonia, now with acute respiratory failure/ARDS.    LLE DVT  heparin drip    Acute respiratory failure  Completed decadron and remdesivir.  - low vt ventilation  - prone position if oxygen requirements increase, for now fio2 is 60%    JER/metabolic acidodis  - nephrology called  - renal us  may need cvvhd    Distributive shock possibly sepsis  - cefepime, 1 dose vancomycin  - ordered blood cultures  - fluid bolus ordered  lactate normal    Will update family Overnight with increasing pressor requirements, renal failure    EXAM:   intubated and sedated  reg rhythm  reg rhythm  bilat air entry  abd soft nt  trace edema    cxr- severe bilat interstitial and airspace opacities     IMPRESSION/ASSESSMENT & PLAN:   59 yo female with DM, HTN, admitted 1/25 with covid 19 viral pneumonia, now with acute respiratory failure/ARDS.    LLE DVT  heparin drip    Acute respiratory failure  Completed decadron and remdesivir.  - low vt ventilation  - prone position if oxygen requirements increase, for now fio2 is 60%    JER/metabolic acidodis  - nephrology called  - renal us  may need cvvhd    Distributive shock possibly sepsis  - cefepime, 1 dose vancomycin  - ordered blood cultures  - fluid bolus ordered  lactate normal    Daughter Mi updated by phone, asked for lab values.

## 2021-02-09 NOTE — PROGRESS NOTE ADULT - SUBJECTIVE AND OBJECTIVE BOX
MICU    Ms Luu    -  episode of desaturation    -  bedside u/s  per Dr Mccann  with ? RV dilation    TPA ordered  - review of chart    worsening fibrosis  of right lung  - elevated WBC  with bloody secretions     -will add IV solu -medrol    for fibrotic lung  - add   merrem/ vanco    for superimposed infection which seems likely      - long discussion with radha SANCHEZ at bedside    - poor prognosis discussed    want full   code for now

## 2021-02-09 NOTE — PROGRESS NOTE ADULT - ASSESSMENT
1) ATN  2) Respiratory failure  3) COVID 19 RUTH  4) Anemia  5) metabolic acidosis    Non oliguric, SCr elevated/ stable  Improving SC02, d/c bicarb gtt  Will follow

## 2021-02-09 NOTE — PROGRESS NOTE ADULT - ASSESSMENT
1: Severe ARDS   2: Acute hypoxic respiratory failure   3: COVID-19 pneumonia   4: JER with MA  5: Anemia with no overt bleeding   6: Shock: mostly distributive     - Patient seen and examined   - Full code   - Sedated with Fentanyl and Propofol infusion s/p Paralyzing agent infusion but RAAS target -4   - Oxygen requirement: Intubated and mechanically ventilated with high PEEP of 12 with FiO2 100% with trending ANG from arterial line for changing ventilator setting further on periodic bases  - Getting 2 units of blood now and hence off of heparin infusion (DVT+), if repeat H&H stable and no overt bleeding, would resume heparin infusion; if remained with higher dose of continued Levophed infusion, would get stat TTE and consider tPA (half or full dose) for possible massive PE   - Remdesivir for 10ays per protocol   - Decadron: 6 mg daily for 10 days   - NPO for now   - Changed to insulin infusion as remained persistently hyperglycemic despite lantus, ISS and scheduled short acting insulin with q1 fingerstick/ BS check  - Inflammatory markers q48-q72 hours   - Nutrition: NPO for now   - Plan d/w Nephrology, holding off of Bicarb drip for now and trend lytes and bicarb closely; 7 ltr net + for now and considering one time Lasix 80 mg IV with Albumin     - Infectious Diseases consult

## 2021-02-09 NOTE — PROGRESS NOTE ADULT - SUBJECTIVE AND OBJECTIVE BOX
Patient is a 60y old  Female who presents with a chief complaint of SOB/COVID (2021 13:03)      HPI/BRIEF HOSPITAL COURSE: ***    Events last 24 hours: ***    PAST MEDICAL & SURGICAL HISTORY:  Hyperlipidemia    Liver disease  (unable to take tylenol )    Hypertension    Diabetes    Diverticulitis    High cholesterol    HTN (hypertension)    DM (diabetes mellitus)    H/O:         Review of Systems:  CONSTITUTIONAL: No fever, chills, or fatigue  EYES: No eye pain, visual disturbances, or discharge  ENMT:  No difficulty hearing, tinnitus, vertigo; No sinus or throat pain  NECK: No pain or stiffness  RESPIRATORY: No cough, wheezing, chills or hemoptysis; No shortness of breath  CARDIOVASCULAR: No chest pain, palpitations, dizziness, or leg swelling  GASTROINTESTINAL: No abdominal or epigastric pain. No nausea, vomiting, or hematemesis; No diarrhea or constipation. No melena or hematochezia.  GENITOURINARY: No dysuria, frequency, hematuria, or incontinence  NEUROLOGICAL: No headaches, memory loss, loss of strength, numbness, or tremors  SKIN: No itching, burning, rashes, or lesions   MUSCULOSKELETAL: No joint pain or swelling; No muscle, back, or extremity pain  PSYCHIATRIC: No depression, anxiety, mood swings, or difficulty sleeping        Physical Examination:    ICU Vital Signs Last 24 Hrs  T(C): 37.2 (2021 12:00), Max: 37.2 (2021 08:53)  T(F): 98.9 (2021 12:00), Max: 98.9 (2021 08:53)  HR: 103 (2021 15:00) (90 - 103)  BP: --  BP(mean): --  ABP: 117/50 (2021 15:00) (89/69 - 128/54)  ABP(mean): 68 (2021 15:00) (54 - 79)  RR: 35 (2021 15:00) (22 - 35)  SpO2: 93% (2021 15:00) (88% - 100%)      General: No acute distress.      Neuro: AAO*3, No motor, sensory, or cranial nerve deficit    HEENT: Pupils equal, reactive to light, Oral mucosa    PULM: Clear to auscultation bilaterally, no significant adventitious breath sounds     CVS: Regular rhythm and controlled rate, no murmurs, rubs, or gallops    ABD: Soft, nondistended, nontender, normoactive bowel sounds, no CVA tenderness    EXT: No b/l LE edema, nontender with pedal pulse palpable     SKIN: Warm and well perfused, no acute rashes       Medications:    norepinephrine Infusion 0.02 MICROgram(s)/kG/Min IV Continuous <Continuous>      acetaminophen   Tablet .. 650 milliGRAM(s) Oral every 6 hours PRN  fentaNYL   Infusion 0.5 MICROgram(s)/kG/Hr IV Continuous <Continuous>  propofol Infusion 10 MICROgram(s)/kG/Min IV Continuous <Continuous>        pantoprazole  Injectable 40 milliGRAM(s) IV Push every 12 hours      dextrose 40% Gel 15 Gram(s) Oral once  dextrose 50% Injectable 25 Gram(s) IV Push once  dextrose 50% Injectable 25 Gram(s) IV Push once  glucagon  Injectable 1 milliGRAM(s) IntraMuscular once  insulin regular Infusion 6 Unit(s)/Hr IV Continuous <Continuous>  vasopressin Infusion 0.04 Unit(s)/Min IV Continuous <Continuous>    dextrose 5%. 1000 milliLiter(s) IV Continuous <Continuous>  dextrose 5%. 1000 milliLiter(s) IV Continuous <Continuous>      chlorhexidine 0.12% Liquid 15 milliLiter(s) Oral Mucosa every 12 hours  chlorhexidine 2% Cloths 1 Application(s) Topical <User Schedule>  chlorhexidine 4% Liquid 1 Application(s) Topical <User Schedule>        Mode: AC/ CMV (Assist Control/ Continuous Mandatory Ventilation)  RR (machine): 35  TV (machine): 400  FiO2: 80  PEEP: 16  MAP: 24  PIP: 36      I&O's Detail    2021 07:01  -  2021 07:00  --------------------------------------------------------  IN:    Cisatracurium: 52.4 mL    FentaNYL: 196.5 mL    Heparin Infusion: 36 mL    Heparin Infusion: 172 mL    IV PiggyBack: 250 mL    Jevity 1.5: 320 mL    Lactated Ringers Bolus: 2300 mL    Norepinephrine: 482 mL    Propofol: 214.6 mL    Sodium Bicarbonate: 2700 mL    Vasopressin: 45.6 mL  Total IN: 6769.1 mL    OUT:    Indwelling Catheter - Urethral (mL): 1515 mL  Total OUT: 1515 mL    Total NET: 5254.1 mL      2021 07:01  -  2021 15:31  --------------------------------------------------------  IN:    Heparin Infusion: 27 mL    Jevity 1.5: 20 mL    Norepinephrine: 474.8 mL    Propofol: 157.5 mL    Sodium Bicarbonate: 1350 mL    Vasopressin: 21.6 mL  Total IN: 2050.9 mL    OUT:    Indwelling Catheter - Urethral (mL): 495 mL  Total OUT: 495 mL    Total NET: 1555.9 mL            RADIOLOGY/ Microbiology/ Labs: reviewed     I have personally provided  minutes of critical care time excluding time spent on separate procedures

## 2021-02-09 NOTE — PROGRESS NOTE ADULT - SUBJECTIVE AND OBJECTIVE BOX
Patient is a 60y old  Female who presents with a chief complaint of SOB/COVID (2021 13:04)      BRIEF HOSPITAL COURSE:   Patient is a 60 year old female with a pmhx of HTN, T2DM, HLD, who presented with vomiting and fever, diagnosed with COVID-19 on . Admitted with acute hypoxemic respiratory failure secondary to COVID-19 viral pneumonia on . Initial CTA negative for PE, and initial LE duplex negative for DVT. She was started on Remdesivir and IV Decadron. Found to have acute LLE DVT on , started on full dose Lovenox. TTE gI dHFpEF without acute cor pulmonale. RRT 2/3 with acute hypoxemic respiratory failure, started on BiPAP, remained hypoxic converted to CPAP and transferred to ICU for further care. Developed worsening respiratory failure, severe ARDS and was intubated .      Events last 24 hours:   - worsening renal fnx  - unable to titrate oxygen given low sp02    PAST MEDICAL & SURGICAL HISTORY:  Hyperlipidemia    Liver disease  (unable to take tylenol )    Hypertension    Diabetes    Diverticulitis    High cholesterol    HTN (hypertension)    DM (diabetes mellitus)    H/O:           Hosp day #15d    Vent day #4  Mode: AC/ CMV (Assist Control/ Continuous Mandatory Ventilation)  RR (machine): 35  TV (machine): 400  FiO2: 100  PEEP: 16  PS:   MAP: 24  PIP: 38          Vital signs / Reviewed and Physical Exam Performed where pertinent and urgently required    Lab / Radiology  studies / ABG / Meds -  reviewed and interpreted into the assessment and treatment plan.      Assessment/Plan/Therapeutic interventions    1. Acute hypoxic resp failure   2. covid 19  3. ARDS  4. viral pna  5. Shock   6. LLE DVT   7. ARF      Neuro - nimbex is off, sedated with propofol and fentanyl to facilitate safe ventilation     CV -  Pressor support as needed to maintain MAP 65           Currently on levophed and vaso to keep MAP 65 and above. Suspected mixed shock picture septic from possible underlying infx vs sedative related     Pulm -  ARDS-NET 4-6cc/kg IBW TV as able to maintain plateau pressures <30               Prone ventilation consideration as feasible  Pa02/Fi02 < 150 on Fi02 >60% and PEEP at least 5                 Vent bundle Reviewed                Pt with high oxygen requirements to maintain sats above 88%. Current VAC 30/400/100/16. Morning ABG done, and are unable to titrate fi02 further.  less than 75      GI -  PPI  Enteric feeds as tolerated in tandem with NMB and prone ventilation    Renal - Pt with ARF likely ATN and Covid related. Currently on sodium, bicarb drip. Decent urine output, cont on drip for now but consider d/c if bicarb continue to improve     Heme -  heparin drip for LLE DVT     ID - Cefepime started for rising WBC count. Blood cxs received and pending     Endo -  Aggressive glycemic control to limit FS glucose to < 180mg/dl.      COVID 19 specific considerations and therapeutic  options based on the available and rapidly changing literature    Goals of care considerations:  Ongoing assessment for patient specific treatment options based on progression or decline.  I have involved the family with updates and requests in guidance for medical decision making.          38  Minutes of critical care team spent in the management of this critically ill COVID-19 Patient / PUI patient with continuous assessments and interventions based on the interpretation of multiple databases.

## 2021-02-09 NOTE — PROGRESS NOTE ADULT - SUBJECTIVE AND OBJECTIVE BOX
[facility] DIVISION OF KIDNEY DISEASES AND HYPERTENSION -- FOLLOW UP NOTE  --------------------------------------------------------------------------------  Chief Complaint: lakisha    24 hour events/subjective:  remains intubated/ sedated      PAST HISTORY  --------------------------------------------------------------------------------  No significant changes to PMH, PSH, FHx, SHx, unless otherwise noted    ALLERGIES & MEDICATIONS  --------------------------------------------------------------------------------  Allergies    Allergy Status Unknown  No Known Allergies    Intolerances      Standing Inpatient Medications  chlorhexidine 0.12% Liquid 15 milliLiter(s) Oral Mucosa every 12 hours  cisatracurium Infusion 3 MICROgram(s)/kG/Min IV Continuous <Continuous>  dextrose 40% Gel 15 Gram(s) Oral once  dextrose 5%. 1000 milliLiter(s) IV Continuous <Continuous>  dextrose 5%. 1000 milliLiter(s) IV Continuous <Continuous>  dextrose 50% Injectable 25 Gram(s) IV Push once  dextrose 50% Injectable 25 Gram(s) IV Push once  fentaNYL   Infusion 0.5 MICROgram(s)/kG/Hr IV Continuous <Continuous>  glucagon  Injectable 1 milliGRAM(s) IntraMuscular once  insulin glargine Injectable (LANTUS) 15 Unit(s) SubCutaneous at bedtime  insulin glargine Injectable (LANTUS) 15 Unit(s) SubCutaneous every morning  insulin lispro (ADMELOG) corrective regimen sliding scale   SubCutaneous every 6 hours  insulin lispro Injectable (ADMELOG) 4 Unit(s) SubCutaneous every 6 hours  norepinephrine Infusion 0.02 MICROgram(s)/kG/Min IV Continuous <Continuous>  pantoprazole  Injectable 40 milliGRAM(s) IV Push every 12 hours  propofol Infusion 10 MICROgram(s)/kG/Min IV Continuous <Continuous>  sodium bicarbonate  Infusion 0.31 mEq/kG/Hr IV Continuous <Continuous>  vasopressin Infusion 0.04 Unit(s)/Min IV Continuous <Continuous>    PRN Inpatient Medications  acetaminophen   Tablet .. 650 milliGRAM(s) Oral every 6 hours PRN      REVIEW OF SYSTEMS  --------------------------------------------------------------------------------  unable to obtain  VITALS/PHYSICAL EXAM  --------------------------------------------------------------------------------  T(C): 37.2 (02-09-21 @ 12:00), Max: 37.2 (02-09-21 @ 08:53)  HR: 99 (02-09-21 @ 13:00) (90 - 103)  BP: --  RR: 35 (02-09-21 @ 13:00) (22 - 45)  SpO2: 98% (02-09-21 @ 13:00) (88% - 100%)  Wt(kg): --        02-08-21 @ 07:01  -  02-09-21 @ 07:00  --------------------------------------------------------  IN: 6769.1 mL / OUT: 1515 mL / NET: 5254.1 mL    02-09-21 @ 07:01  -  02-09-21 @ 13:03  --------------------------------------------------------  IN: 1395.2 mL / OUT: 325 mL / NET: 1070.2 mL      Physical Exam:  	Due to the nature of the pt's isolation status, no bedside physical exam done to limit spread of infection. Objective data was reviewed in detail.      LABS/STUDIES  --------------------------------------------------------------------------------              6.7    24.34 >-----------<  302      [02-09-21 @ 10:41]              21.7     138  |  100  |  74.0  ----------------------------<  262      [02-09-21 @ 02:45]  3.8   |  25.0  |  3.12        Ca     6.9     [02-09-21 @ 02:45]      Mg     2.3     [02-09-21 @ 02:45]      Phos  5.2     [02-09-21 @ 02:45]    TPro  5.5  /  Alb  1.5  /  TBili  0.4  /  DBili  0.3  /  AST  52  /  ALT  15  /  AlkPhos  88  [02-09-21 @ 02:45]    PT/INR: PT 14.6 , INR 1.27       [02-08-21 @ 10:36]  PTT: 171.7      [02-09-21 @ 09:10]      Creatinine Trend:  SCr 3.12 [02-09 @ 02:45]  SCr 3.11 [02-08 @ 13:42]  SCr 3.32 [02-08 @ 09:16]  SCr 3.35 [02-08 @ 03:40]  SCr 1.12 [02-07 @ 03:59]    Urinalysis - [01-28-21 @ 20:13]      Color Yellow / Appearance Slightly Turbid / SG 1.015 / pH 6.0      Gluc 250 / Ketone Negative  / Bili Negative / Urobili Negative       Blood Small / Protein 30 / Leuk Est Negative / Nitrite Negative      RBC 0-2 / WBC 3-5 / Hyaline  / Gran  / Sq Epi  / Non Sq Epi Occasional / Bacteria Many      Iron 16, TIBC 320, %sat 5      [01-26-21 @ 07:57]  Ferritin 915      [01-27-21 @ 21:55]  Lipid: chol --, , HDL --, LDL --      [02-09-21 @ 02:45]    HCV 0.08, Nonreact      [01-26-21 @ 13:28]

## 2021-02-10 NOTE — PROGRESS NOTE ADULT - SUBJECTIVE AND OBJECTIVE BOX
Patient is a 60y old  Female who presents with a chief complaint of SOB/COVID (2021 20:39)      BRIEF HOSPITAL COURSE:   Patient is a 60 year old female with a pmhx of HTN, T2DM, HLD, who presented with vomiting and fever, diagnosed with COVID-19 on . Admitted with acute hypoxemic respiratory failure secondary to COVID-19 viral pneumonia on . Initial CTA negative for PE, and initial LE duplex negative for DVT. She was started on Remdesivir and IV Decadron. Found to have acute LLE DVT on , started on full dose Lovenox. TTE gI dHFpEF without acute cor pulmonale. RRT 2/3 with acute hypoxemic respiratory failure, started on BiPAP, remained hypoxic converted to CPAP and transferred to ICU for further care. Developed worsening respiratory failure, severe ARDS and was intubated .    Events last 24 hours:   - more hypotensive tachycardiac and hypoxic  - 50mg TPA given x2, started steroids   - added 3rd pressor    PAST MEDICAL & SURGICAL HISTORY:  Hyperlipidemia    Liver disease  (unable to take tylenol )    Hypertension    Diabetes    Diverticulitis    High cholesterol    HTN (hypertension)    DM (diabetes mellitus)    H/O:           Hosp day #16d    Vent day #5  Mode: AC/ CMV (Assist Control/ Continuous Mandatory Ventilation)  RR (machine): 34  TV (machine): 400  FiO2: 100  PEEP: 20  MAP: 25  PIP: 38          Vital signs / Reviewed and Physical Exam Performed where pertinent and urgently required    Lab / Radiology  studies / ABG / Meds -  reviewed and interpreted into the assessment and treatment plan.      Assessment/Plan/Therapeutic interventions  1. Acute hypoxic resp failure   2. covid 19  3. ARDS  4. viral pna  5. Shock   6. LLE DVT   7. ARF  8. Massive PE     Neuro - nimbex is off, sedated with propofol and fentanyl to facilitate safe ventilation     CV -  Pressor support as needed to maintain MAP 65           Avoiding fluid challenges           Called to bedside for worsening hypotension, hypoxia and tachycardia to the 140s. POCUS with what appears large RV. Given clinical findings will dose TPA 50mg now and then will repeat for 100mg total for suspected massive PE. Monitor for bleeding. Follow up TTE.          Currently on levophed and vaso to keep MAP 65 and above. Tonight given sustained hypotension I had to add a phenylephrine drip to maintain MAP above 65. Will attempt to titrate down on levophed given significant tachycardia. Suspected mixed shock picture septic from possible underlying infx vs sedative related vs obstructive    Pulm -  ARDS-NET 4-6cc/kg IBW TV as able to maintain plateau pressures <30               Prone ventilation consideration as feasible  Pa02/Fi02 < 150 on Fi02 >60% and PEEP at least 5                 Vent bundle Reviewed                 Pt with high oxygen requirements to maintain sats in the 70s. Current VAC 30/400/100/16. Sats slowly improving with TPA administration     GI -  PPI  Enteric feeds as tolerated in tandem with NMB and prone ventilation    Renal - Pt with ARF likely ATN and Covid related. Off bicarb drip at this time     Heme -  heparin drip for LLE DVT which will be restarted 1 hour after TPA is done infusing     ID - vanco and meropenum were started for rising WBC count and concern for superimposed infection. Blood cxs received and pending     Endo -  Aggressive glycemic control to limit FS glucose to < 180mg/dl.    Family came to visit patient today. They were updated on poor prognosis       COVID 19 specific considerations and therapeutic  options based on the available and rapidly changing literature    Goals of care considerations:  Ongoing assessment for patient specific treatment options based on progression or decline.  I have involved the family with updates and requests in guidance for medical decision making.          43  Minutes of critical care team spent in the management of this critically ill COVID-19 Patient / PUI patient with continuous assessments and interventions based on the interpretation of multiple databases.       Patient is a 60y old  Female who presents with a chief complaint of SOB/COVID (2021 20:39)      BRIEF HOSPITAL COURSE:   Patient is a 60 year old female with a pmhx of HTN, T2DM, HLD, who presented with vomiting and fever, diagnosed with COVID-19 on . Admitted with acute hypoxemic respiratory failure secondary to COVID-19 viral pneumonia on . Initial CTA negative for PE, and initial LE duplex negative for DVT. She was started on Remdesivir and IV Decadron. Found to have acute LLE DVT on , started on full dose Lovenox. TTE gI dHFpEF without acute cor pulmonale. RRT 2/3 with acute hypoxemic respiratory failure, started on BiPAP, remained hypoxic converted to CPAP and transferred to ICU for further care. Developed worsening respiratory failure, severe ARDS and was intubated .    Events last 24 hours:   - more hypotensive tachycardiac and hypoxic  - 50mg TPA given x2, started steroids   - added 3rd pressor    PAST MEDICAL & SURGICAL HISTORY:  Hyperlipidemia    Liver disease  (unable to take tylenol )    Hypertension    Diabetes    Diverticulitis    High cholesterol    HTN (hypertension)    DM (diabetes mellitus)    H/O:           Hosp day #16d    Vent day #5  Mode: AC/ CMV (Assist Control/ Continuous Mandatory Ventilation)  RR (machine): 34  TV (machine): 400  FiO2: 100  PEEP: 20  MAP: 25  PIP: 38          Vital signs / Reviewed and Physical Exam Performed where pertinent and urgently required    Lab / Radiology  studies / ABG / Meds -  reviewed and interpreted into the assessment and treatment plan.      Assessment/Plan/Therapeutic interventions  1. Acute hypoxic resp failure   2. covid 19  3. ARDS  4. viral pna  5. Shock   6. LLE DVT   7. ARF  8. Massive PE     Neuro - nimbex is off, sedated with propofol and fentanyl to facilitate safe ventilation     CV -  Pressor support as needed to maintain MAP 65           Avoiding fluid challenges           Called to bedside for worsening hypotension, hypoxia and tachycardia to the 140s. POCUS with what appears large RV. Given clinical findings will dose TPA 50mg now and then will repeat for 100mg total for suspected massive PE. Monitor for bleeding. Follow up TTE.          Currently on levophed and vaso to keep MAP 65 and above. Tonight given sustained hypotension I had to add a phenylephrine drip to maintain MAP above 65. Will attempt to titrate down on levophed given significant tachycardia. Suspected mixed shock picture septic from possible underlying infx vs sedative related vs obstructive    Pulm -  ARDS-NET 4-6cc/kg IBW TV as able to maintain plateau pressures <30               Prone ventilation consideration as feasible  Pa02/Fi02 < 150 on Fi02 >60% and PEEP at least 5                 Vent bundle Reviewed                 Pt with high oxygen requirements to maintain sats in the 70s. Current VAC 30/400/100/16. Sats slowly improving with TPA administration     GI -  PPI  Enteric feeds as tolerated in tandem with NMB and prone ventilation    Renal - Pt with ARF likely ATN and Covid related. Off bicarb drip at this time     Heme -  heparin drip for LLE DVT which will be restarted 1 hour after TPA is done infusing     ID - vanco and meropenum were started for rising WBC count and concern for superimposed infection. Blood cxs received and pending     Endo -  Aggressive glycemic control to limit FS glucose to < 180mg/dl with aid of insulin drip. q1 hr accu-checks    Family came to visit patient today. They were updated on poor prognosis       COVID 19 specific considerations and therapeutic  options based on the available and rapidly changing literature    Goals of care considerations:  Ongoing assessment for patient specific treatment options based on progression or decline.  I have involved the family with updates and requests in guidance for medical decision making.          43  Minutes of critical care team spent in the management of this critically ill COVID-19 Patient / PUI patient with continuous assessments and interventions based on the interpretation of multiple databases.

## 2021-02-10 NOTE — PROGRESS NOTE ADULT - SUBJECTIVE AND OBJECTIVE BOX
[facility] DIVISION OF KIDNEY DISEASES AND HYPERTENSION -- FOLLOW UP NOTE  --------------------------------------------------------------------------------  Chief Complaint:  JER    24 hour events/subjective:  Remains intubated;  SCr stable;      PAST HISTORY  --------------------------------------------------------------------------------  No significant changes to PMH, PSH, FHx, SHx, unless otherwise noted    ALLERGIES & MEDICATIONS  --------------------------------------------------------------------------------  Allergies    Allergy Status Unknown  No Known Allergies    Intolerances      Standing Inpatient Medications  chlorhexidine 0.12% Liquid 15 milliLiter(s) Oral Mucosa every 12 hours  chlorhexidine 2% Cloths 1 Application(s) Topical <User Schedule>  chlorhexidine 4% Liquid 1 Application(s) Topical <User Schedule>  cisatracurium Infusion 3 MICROgram(s)/kG/Min IV Continuous <Continuous>  dextrose 40% Gel 15 Gram(s) Oral once  dextrose 5%. 1000 milliLiter(s) IV Continuous <Continuous>  dextrose 5%. 1000 milliLiter(s) IV Continuous <Continuous>  dextrose 50% Injectable 25 Gram(s) IV Push once  dextrose 50% Injectable 25 Gram(s) IV Push once  famotidine Injectable 20 milliGRAM(s) IV Push daily  fentaNYL   Infusion 0.5 MICROgram(s)/kG/Hr IV Continuous <Continuous>  glucagon  Injectable 1 milliGRAM(s) IntraMuscular once  heparin  Infusion.  Unit(s)/Hr IV Continuous <Continuous>  insulin regular Infusion 6 Unit(s)/Hr IV Continuous <Continuous>  meropenem  IVPB 500 milliGRAM(s) IV Intermittent every 12 hours  methylPREDNISolone sodium succinate Injectable 40 milliGRAM(s) IV Push every 6 hours  norepinephrine Infusion 0.02 MICROgram(s)/kG/Min IV Continuous <Continuous>  phenylephrine    Infusion 1 MICROgram(s)/kG/Min IV Continuous <Continuous>  propofol Infusion 10 MICROgram(s)/kG/Min IV Continuous <Continuous>  vasopressin Infusion 0.04 Unit(s)/Min IV Continuous <Continuous>    PRN Inpatient Medications  acetaminophen   Tablet .. 650 milliGRAM(s) Oral every 6 hours PRN  heparin   Injectable 6000 Unit(s) IV Push every 6 hours PRN  heparin   Injectable 3000 Unit(s) IV Push every 6 hours PRN      REVIEW OF SYSTEMS  --------------------------------------------------------------------------------  Unable to obtain    VITALS/PHYSICAL EXAM  --------------------------------------------------------------------------------  T(C): 36.1 (02-10-21 @ 11:26), Max: 37.2 (02-09-21 @ 12:00)  HR: 83 (02-10-21 @ 11:00) (83 - 137)  BP: --  RR: 34 (02-10-21 @ 11:00) (30 - 35)  SpO2: 94% (02-10-21 @ 11:00) (73% - 98%)  Wt(kg): --        02-09-21 @ 07:01  -  02-10-21 @ 07:00  --------------------------------------------------------  IN: 2954.5 mL / OUT: 1270 mL / NET: 1684.5 mL    02-10-21 @ 07:01  -  02-10-21 @ 11:35  --------------------------------------------------------  IN: 285.1 mL / OUT: 215 mL / NET: 70.1 mL          Vital signs / Reviewed and Physical Exam Performed where pertinent and urgently required    LABS/STUDIES  --------------------------------------------------------------------------------              7.4    22.39 >-----------<  259      [02-10-21 @ 09:55]              23.5     138  |  96  |  76.0  ----------------------------<  273      [02-09-21 @ 18:43]  4.1   |  27.0  |  3.51        Ca     6.8     [02-09-21 @ 18:43]      Mg     2.3     [02-09-21 @ 02:45]      Phos  5.2     [02-09-21 @ 02:45]    TPro  5.9  /  Alb  1.8  /  TBili  1.2  /  DBili  x   /  AST  55  /  ALT  17  /  AlkPhos  100  [02-09-21 @ 18:43]      PTT: 197.0      [02-10-21 @ 09:54]          [02-09-21 @ 18:43]        [02-09-21 @ 18:43]    Creatinine Trend:  SCr 3.51 [02-09 @ 18:43]  SCr 3.12 [02-09 @ 02:45]  SCr 3.11 [02-08 @ 13:42]  SCr 3.32 [02-08 @ 09:16]  SCr 3.35 [02-08 @ 03:40]    Urinalysis - [01-28-21 @ 20:13]      Color Yellow / Appearance Slightly Turbid / SG 1.015 / pH 6.0      Gluc 250 / Ketone Negative  / Bili Negative / Urobili Negative       Blood Small / Protein 30 / Leuk Est Negative / Nitrite Negative      RBC 0-2 / WBC 3-5 / Hyaline  / Gran  / Sq Epi  / Non Sq Epi Occasional / Bacteria Many      Iron 16, TIBC 320, %sat 5      [01-26-21 @ 07:57]  Ferritin 2568      [02-09-21 @ 18:43]  Lipid: chol --, , HDL --, LDL --      [02-09-21 @ 02:45]    HCV 0.08, Nonreact      [01-26-21 @ 13:28]

## 2021-02-10 NOTE — PROGRESS NOTE ADULT - SUBJECTIVE AND OBJECTIVE BOX
On Admission  21 (16d)  HPI:  61 y/o F w/ a PMH of DMII, HTN came in c/o sob worsening over the past week and associated naussea and NBNB vomiting x 2 days.  Pt was recently diagnosed as an outpt w/ COVID on  and was told by PCP if her O2 sat went <92 she should go to the hospital.  Pt came to the ED 2 days ago and O2 sat was noted to be >90% and was discharged home.  Pts SOB did not improve and she came back in today.  Pt also reports subjective fevers/chills at home but denies cp, palpitations, abd pain, lower extremity swelling/pain. (2021 18:19)    PAST MEDICAL & SURGICAL HISTORY:  Hyperlipidemia    Liver disease  (unable to take tylenol )    Hypertension    Diabetes    Diverticulitis    High cholesterol    HTN (hypertension)    DM (diabetes mellitus)    H/O:         Antimicrobial:  meropenem  IVPB 500 milliGRAM(s) IV Intermittent every 12 hours    Cardiovascular:  norepinephrine Infusion 0.02 MICROgram(s)/kG/Min IV Continuous <Continuous>  phenylephrine    Infusion 1 MICROgram(s)/kG/Min IV Continuous <Continuous>    Pulmonary:    Hematalogic:  heparin   Injectable 6000 Unit(s) IV Push every 6 hours PRN  heparin   Injectable 3000 Unit(s) IV Push every 6 hours PRN  heparin  Infusion.  Unit(s)/Hr IV Continuous <Continuous>    Other:  acetaminophen   Tablet .. 650 milliGRAM(s) Oral every 6 hours PRN  chlorhexidine 0.12% Liquid 15 milliLiter(s) Oral Mucosa every 12 hours  chlorhexidine 2% Cloths 1 Application(s) Topical <User Schedule>  chlorhexidine 4% Liquid 1 Application(s) Topical <User Schedule>  cisatracurium Infusion 3 MICROgram(s)/kG/Min IV Continuous <Continuous>  dextrose 40% Gel 15 Gram(s) Oral once  dextrose 5%. 1000 milliLiter(s) IV Continuous <Continuous>  dextrose 5%. 1000 milliLiter(s) IV Continuous <Continuous>  dextrose 50% Injectable 25 Gram(s) IV Push once  dextrose 50% Injectable 25 Gram(s) IV Push once  famotidine Injectable 20 milliGRAM(s) IV Push daily  fentaNYL   Infusion 0.5 MICROgram(s)/kG/Hr IV Continuous <Continuous>  glucagon  Injectable 1 milliGRAM(s) IntraMuscular once  insulin regular Infusion 6 Unit(s)/Hr IV Continuous <Continuous>  methylPREDNISolone sodium succinate Injectable 40 milliGRAM(s) IV Push every 6 hours  propofol Infusion 10 MICROgram(s)/kG/Min IV Continuous <Continuous>  vasopressin Infusion 0.04 Unit(s)/Min IV Continuous <Continuous>      Drug Dosing Weight  Height (cm): 167.6 (2021 15:31)  Weight (kg): 72.6 (2021 15:31)  BMI (kg/m2): 25.8 (2021 15:31)  BSA (m2): 1.82 (2021 15:31)    T(C): 36.1 (02-10-21 @ 11:26), Max: 37.2 (21 @ 12:00)  HR: 83 (02-10-21 @ 11:00)  BP: --  BP(mean): --  ABP: 89/55 (02-10-21 @ 11:26)  ABP(mean): 76 (02-10-21 @ 11:00)  RR: 34 (02-10-21 @ 11:00)  SpO2: 94% (02-10-21 @ 11:00)    ABG - ( 10 Feb 2021 06:00 )  pH, Arterial: 7.33  pH, Blood: x     /  pCO2: 53    /  pO2: 83    / HCO3: 26    / Base Excess: 1.8   /  SaO2: 97                     @ 07:01  -  02-10 @ 07:00  --------------------------------------------------------  IN: 2954.5 mL / OUT: 1270 mL / NET: 1684.5 mL        Mode: AC/ CMV (Assist Control/ Continuous Mandatory Ventilation)  RR (machine): 34  TV (machine): 420  FiO2: 100  PEEP: 12  MAP: 23  PIP: 34        LABS:  CBC Full  -  ( 10 Feb 2021 09:55 )  WBC Count : 22.39 K/uL  RBC Count : 2.65 M/uL  Hemoglobin : 7.4 g/dL  Hematocrit : 23.5 %  Platelet Count - Automated : 259 K/uL  Mean Cell Volume : 88.7 fl  Mean Cell Hemoglobin : 27.9 pg  Mean Cell Hemoglobin Concentration : 31.5 gm/dL  Auto Neutrophil # : x  Auto Lymphocyte # : x  Auto Monocyte # : x  Auto Eosinophil # : x  Auto Basophil # : x  Auto Neutrophil % : x  Auto Lymphocyte % : x  Auto Monocyte % : x  Auto Eosinophil % : x  Auto Basophil % : x        138  |  96<L>  |  76.0<H>  ----------------------------<  273<H>  4.1   |  27.0  |  3.51<H>    Ca    6.8<L>      2021 18:43  Phos  5.2       Mg     2.3         TPro  5.9<L>  /  Alb  1.8<L>  /  TBili  1.2  /  DBili  x   /  AST  55<H>  /  ALT  17  /  AlkPhos  100      PTT - ( 10 Feb 2021 09:54 )  PTT:197.0 sec    Culture Results:   No growth at 48 hours ( @ 10:42)  Culture Results:   No growth at 48 hours ( @ 10:42)    ____________________________________________________________________________________________________

## 2021-02-10 NOTE — PROGRESS NOTE ADULT - ASSESSMENT
1) ATN  2) Respiratory failure  3) COVID 19 RUTH  4) Anemia  5) metabolic acidosis    Non oliguric, SCr elevated/ stable  Improving SC02, d/c bicarb gtt  Consider PRBC transfusion;    felisha Haddad

## 2021-02-10 NOTE — PROGRESS NOTE ADULT - ASSESSMENT
Overnight patient became hypotensive with increased pressor requirements, more hypoxic, given TPA for suspected PE.    EXAM:   intubated and sedated  reg rhythm  bilat air entry  abd soft nt  trace edema    cxr- severe bilat interstitial and airspace opacities     IMPRESSION/ASSESSMENT & PLAN:   59 yo female with DM, HTN, admitted 1/25 with covid 19 viral pneumonia, now with acute respiratory failure/ARDS, shock.    LLE DVT/ possible PE  received TPA 2/9  heparin drip    Acute respiratory failure  Completed decadron and remdesivir.  - low vt ventilation    JER/metabolic acidodis  - nephrology following     Distributive shock possibly sepsis  - empiric abx  - cultures negative so far    Anemia  - will transfuse 1 unit PRBC since Hb trending down, at request of nephrology    DM  - insulin infusion   Overnight patient became hypotensive with increased pressor requirements, more hypoxic, given TPA for suspected PE.    EXAM:   intubated and sedated  reg rhythm  bilat air entry  abd soft nt  trace edema    cxr- severe bilat interstitial and airspace opacities     IMPRESSION/ASSESSMENT & PLAN:   59 yo female with DM, HTN, admitted 1/25 with covid 19 viral pneumonia, now with acute respiratory failure/ARDS, shock.    LLE DVT/ possible PE  received TPA 2/9  heparin drip    Acute respiratory failure  Completed decadron and remdesivir.  - low vt ventilation  - started again on steroids    JER/metabolic acidodis  - nephrology following     Distributive shock possibly sepsis  - empiric abx  - cultures negative so far    Anemia  - will transfuse 1 unit PRBC since Hb trending down, at request of nephrology    DM  - insulin infusion    DVT proph: hep  GI proph: pepcid  diet: tube feeds  Daughter Mi Jolley updated by phone, gave consent for transfusion  Prognosis guarded.

## 2021-02-11 NOTE — PROGRESS NOTE ADULT - SUBJECTIVE AND OBJECTIVE BOX
Patient is a 60y old  Female who presents with a chief complaint of SOB/COVID (2021 12:39)      BRIEF HOSPITAL COURSE: ***    Events last 24 hours:   Remains intubated/sedated/paralyzed.       PAST MEDICAL & SURGICAL HISTORY:  Hyperlipidemia  Liver disease  (unable to take tylenol )  Hypertension  Diabetes  Diverticulitis  High cholesterol  HTN (hypertension)  DM (diabetes mellitus)  H/O:       Allergies  Allergy Status Unknown  No Known Allergies      FAMILY HISTORY:  No pertinent family history in first degree relatives  No pertinent family history in first degree relatives      Social History:   From home       Review of Systems:  Unable to obtain, intubated/sedated    Physical Examination:    General: Adult female, lying in bed, NAD     HEENT: NC/AT, ETT and OGT in place.     PULM: Symmetrical thorax expansion upon respiration.  Coarse to auscultation bilaterally, no significant sputum production    CVS: Regular rate and rhythm, no murmurs, rubs, or gallops appreciated    ABD: Soft, nondistended, nontender, normoactive bowel sounds, no masses appreciated    EXT: + edema, nontender    SKIN: Warm and well perfused, no rashes noted.    NEURO: Sedated      Medications:  meropenem  IVPB 500 milliGRAM(s) IV Intermittent every 12 hours  norepinephrine Infusion 0.02 MICROgram(s)/kG/Min IV Continuous <Continuous>  acetaminophen   Tablet .. 650 milliGRAM(s) Oral every 6 hours PRN  cisatracurium Infusion 3 MICROgram(s)/kG/Min IV Continuous <Continuous>  fentaNYL   Infusion 0.5 MICROgram(s)/kG/Hr IV Continuous <Continuous>  propofol Infusion 10 MICROgram(s)/kG/Min IV Continuous <Continuous>  heparin   Injectable 6000 Unit(s) IV Push every 6 hours PRN  heparin   Injectable 3000 Unit(s) IV Push every 6 hours PRN  heparin  Infusion.  Unit(s)/Hr IV Continuous <Continuous>  famotidine Injectable 20 milliGRAM(s) IV Push daily  dextrose 40% Gel 15 Gram(s) Oral once  dextrose 50% Injectable 25 Gram(s) IV Push once  dextrose 50% Injectable 25 Gram(s) IV Push once  glucagon  Injectable 1 milliGRAM(s) IntraMuscular once  insulin regular Infusion 6 Unit(s)/Hr IV Continuous <Continuous>  methylPREDNISolone sodium succinate Injectable 40 milliGRAM(s) IV Push every 6 hours  vasopressin Infusion 0.04 Unit(s)/Min IV Continuous <Continuous>  dextrose 5%. 1000 milliLiter(s) IV Continuous <Continuous>  dextrose 5%. 1000 milliLiter(s) IV Continuous <Continuous>  chlorhexidine 0.12% Liquid 15 milliLiter(s) Oral Mucosa every 12 hours  chlorhexidine 2% Cloths 1 Application(s) Topical <User Schedule>  chlorhexidine 4% Liquid 1 Application(s) Topical <User Schedule>      Mode: AC/ CMV (Assist Control/ Continuous Mandatory Ventilation)  RR (machine): 34  TV (machine): 450  FiO2: 100  PEEP: 12  MAP: 24  PIP: 41      ICU Vital Signs Last 24 Hrs  T(C): 36.8 (2021 15:00), Max: 36.8 (2021 15:00)  T(F): 98.3 (2021 15:00), Max: 98.3 (2021 15:00)  HR: 117 (2021 23:45) (73 - 124)  BP: 126/54 (2021 18:00) (121/57 - 158/76)  BP(mean): 71 (2021 18:00) (71 - 93)  ABP: 123/61 (2021 18:00) (99/56 - 179/83)  ABP(mean): 83 (2021 18:00) (73 - 120)  RR: 34 (2021 18:00) (34 - 34)  SpO2: 89% (2021 23:45) (86% - 96%)    Vital Signs Last 24 Hrs  T(C): 36.8 (2021 15:00), Max: 36.8 (2021 15:00)  T(F): 98.3 (2021 15:00), Max: 98.3 (2021 15:00)  HR: 117 (2021 23:45) (73 - 124)  BP: 126/54 (2021 18:00) (121/57 - 158/76)  BP(mean): 71 (2021 18:00) (71 - 93)  RR: 34 (2021 18:00) (34 - 34)  SpO2: 89% (2021 23:45) (86% - 96%)    ABG - ( 2021 11:26 )  pH, Arterial: 7.32  pH, Blood: x     /  pCO2: 52    /  pO2: 62    / HCO3: 26    / Base Excess: 1.5   /  SaO2: 93          I&O's Detail    10 Feb 2021 07:01  -  2021 07:00  --------------------------------------------------------  IN:    Cisatracurium: 157.2 mL    FentaNYL: 175.2 mL    Glucerna 1.5: 850 mL    Heparin Infusion: 121 mL    Insulin: 43 mL    Norepinephrine: 258.8 mL    Phenylephrine: 64.6 mL    PRBCs (Packed Red Blood Cells): 300 mL    Propofol: 210 mL    Vasopressin: 28.8 mL  Total IN: 2208.6 mL    OUT:    Indwelling Catheter - Urethral (mL): 522 mL  Total OUT: 522 mL  Total NET: 1686.6 mL      2021 07:01  -  2021 02:26  --------------------------------------------------------  IN:    Cisatracurium: 131 mL    FentaNYL: 146 mL    Glucerna 1.5: 500 mL    Heparin Infusion: 70 mL    Insulin: 41 mL    Norepinephrine: 36.9 mL    Propofol: 175 mL    Vasopressin: 9.6 mL  Total IN: 1109.5 mL    OUT:    Indwelling Catheter - Urethral (mL): 173 mL    Phenylephrine: 0 mL  Total OUT: 173 mL  Total NET: 936.5 mL      LABS:                        8.2    30.45 )-----------( 245      ( 2021 04:22 )             25.2     02-11    138  |  96<L>  |  103.0<H>  ----------------------------<  144<H>  4.6   |  24.0  |  4.13<H>    Ca    7.1<L>      2021 04:22  Phos  7.3       Mg     2.7         TPro  5.6<L>  /  Alb  1.9<L>  /  TBili  0.7  /  DBili  x   /  AST  40<H>  /  ALT  14  /  AlkPhos  87        CAPILLARY BLOOD GLUCOSE  POCT Blood Glucose.: 150 mg/dL (2021 01:44)      PTT - ( 2021 13:08 )  PTT:79.5 sec      CULTURES:  Culture Results:   No growth at 48 hours ( @ 10:42)  Culture Results:   No growth at 48 hours ( @ 10:42)      RADIOLOGY:   < from: Xray Chest 1 View- PORTABLE-Urgent (Xray Chest 1 View- PORTABLE-Urgent .) (21 @ 14:50) >   EXAM:  XR CHEST PORTABLE URGENT 1V                          PROCEDURE DATE:  2021      INTERPRETATION:  AP chest, semierect, on 2021 at 1:50 PM. Patient is hypoxic and is positive for the Covid virus.    Heart magnified by technique.    Endotracheal tube, nasogastric tube, left jugular line remain.    There are persistent advanced bilateral infiltrates. The infiltrates have increased, particularly on the right compared to .    IMPRESSION: Advanced infiltrates somewhat increased from . The infiltrates are similar to .    RICCARDO GARCIA MD; Attending Radiologist  This document has been electronically signed. 2021  2:55PM    < end of copied text >      SUPPLEMENTAL O2: AC/VC  LINES: Peripheral, CVC  IVF: N  KWOK: Y  PPx: PPI, Heparin gtt  CONTACT: Y, COVID Patient is a 60y old  Female who presents with a chief complaint of SOB/COVID (2021 12:39)      BRIEF HOSPITAL COURSE:       Events last 24 hours:   Remains intubated/sedated/paralyzed.       PAST MEDICAL & SURGICAL HISTORY:  Hyperlipidemia  Liver disease  (unable to take tylenol )  Hypertension  Diabetes  Diverticulitis  High cholesterol  HTN (hypertension)  DM (diabetes mellitus)  H/O:       Allergies  Allergy Status Unknown  No Known Allergies      FAMILY HISTORY:  No pertinent family history in first degree relatives  No pertinent family history in first degree relatives      Social History:   From home       Review of Systems:  Unable to obtain, intubated/sedated    Physical Examination:    General: Adult female, lying in bed, NAD     HEENT: NC/AT, ETT and OGT in place.     PULM: Symmetrical thorax expansion upon respiration.  Coarse to auscultation bilaterally, no significant sputum production    CVS: Regular rate and rhythm, no murmurs, rubs, or gallops appreciated    ABD: Soft, nondistended, nontender, normoactive bowel sounds, no masses appreciated    EXT: + edema, nontender    SKIN: Warm and well perfused, no rashes noted.    NEURO: Sedated      Medications:  meropenem  IVPB 500 milliGRAM(s) IV Intermittent every 12 hours  norepinephrine Infusion 0.02 MICROgram(s)/kG/Min IV Continuous <Continuous>  acetaminophen   Tablet .. 650 milliGRAM(s) Oral every 6 hours PRN  cisatracurium Infusion 3 MICROgram(s)/kG/Min IV Continuous <Continuous>  fentaNYL   Infusion 0.5 MICROgram(s)/kG/Hr IV Continuous <Continuous>  propofol Infusion 10 MICROgram(s)/kG/Min IV Continuous <Continuous>  heparin   Injectable 6000 Unit(s) IV Push every 6 hours PRN  heparin   Injectable 3000 Unit(s) IV Push every 6 hours PRN  heparin  Infusion.  Unit(s)/Hr IV Continuous <Continuous>  famotidine Injectable 20 milliGRAM(s) IV Push daily  dextrose 40% Gel 15 Gram(s) Oral once  dextrose 50% Injectable 25 Gram(s) IV Push once  dextrose 50% Injectable 25 Gram(s) IV Push once  glucagon  Injectable 1 milliGRAM(s) IntraMuscular once  insulin regular Infusion 6 Unit(s)/Hr IV Continuous <Continuous>  methylPREDNISolone sodium succinate Injectable 40 milliGRAM(s) IV Push every 6 hours  vasopressin Infusion 0.04 Unit(s)/Min IV Continuous <Continuous>  dextrose 5%. 1000 milliLiter(s) IV Continuous <Continuous>  dextrose 5%. 1000 milliLiter(s) IV Continuous <Continuous>  chlorhexidine 0.12% Liquid 15 milliLiter(s) Oral Mucosa every 12 hours  chlorhexidine 2% Cloths 1 Application(s) Topical <User Schedule>  chlorhexidine 4% Liquid 1 Application(s) Topical <User Schedule>      Mode: AC/ CMV (Assist Control/ Continuous Mandatory Ventilation)  RR (machine): 34  TV (machine): 450  FiO2: 100  PEEP: 12  MAP: 24  PIP: 41      ICU Vital Signs Last 24 Hrs  T(C): 36.8 (2021 15:00), Max: 36.8 (2021 15:00)  T(F): 98.3 (2021 15:00), Max: 98.3 (2021 15:00)  HR: 117 (2021 23:45) (73 - 124)  BP: 126/54 (2021 18:00) (121/57 - 158/76)  BP(mean): 71 (2021 18:00) (71 - 93)  ABP: 123/61 (2021 18:00) (99/56 - 179/83)  ABP(mean): 83 (2021 18:00) (73 - 120)  RR: 34 (2021 18:00) (34 - 34)  SpO2: 89% (2021 23:45) (86% - 96%)    Vital Signs Last 24 Hrs  T(C): 36.8 (2021 15:00), Max: 36.8 (2021 15:00)  T(F): 98.3 (2021 15:00), Max: 98.3 (2021 15:00)  HR: 117 (2021 23:45) (73 - 124)  BP: 126/54 (2021 18:00) (121/57 - 158/76)  BP(mean): 71 (2021 18:00) (71 - 93)  RR: 34 (2021 18:00) (34 - 34)  SpO2: 89% (2021 23:45) (86% - 96%)    ABG - ( 2021 11:26 )  pH, Arterial: 7.32  pH, Blood: x     /  pCO2: 52    /  pO2: 62    / HCO3: 26    / Base Excess: 1.5   /  SaO2: 93          I&O's Detail    10 Feb 2021 07:01  -  2021 07:00  --------------------------------------------------------  IN:    Cisatracurium: 157.2 mL    FentaNYL: 175.2 mL    Glucerna 1.5: 850 mL    Heparin Infusion: 121 mL    Insulin: 43 mL    Norepinephrine: 258.8 mL    Phenylephrine: 64.6 mL    PRBCs (Packed Red Blood Cells): 300 mL    Propofol: 210 mL    Vasopressin: 28.8 mL  Total IN: 2208.6 mL    OUT:    Indwelling Catheter - Urethral (mL): 522 mL  Total OUT: 522 mL  Total NET: 1686.6 mL      2021 07:01  -  2021 02:26  --------------------------------------------------------  IN:    Cisatracurium: 131 mL    FentaNYL: 146 mL    Glucerna 1.5: 500 mL    Heparin Infusion: 70 mL    Insulin: 41 mL    Norepinephrine: 36.9 mL    Propofol: 175 mL    Vasopressin: 9.6 mL  Total IN: 1109.5 mL    OUT:    Indwelling Catheter - Urethral (mL): 173 mL    Phenylephrine: 0 mL  Total OUT: 173 mL  Total NET: 936.5 mL      LABS:                        8.2    30.45 )-----------( 245      ( 2021 04:22 )             25.2     02-11    138  |  96<L>  |  103.0<H>  ----------------------------<  144<H>  4.6   |  24.0  |  4.13<H>    Ca    7.1<L>      2021 04:22  Phos  7.3       Mg     2.7         TPro  5.6<L>  /  Alb  1.9<L>  /  TBili  0.7  /  DBili  x   /  AST  40<H>  /  ALT  14  /  AlkPhos  87        CAPILLARY BLOOD GLUCOSE  POCT Blood Glucose.: 150 mg/dL (2021 01:44)      PTT - ( 2021 13:08 )  PTT:79.5 sec      CULTURES:  Culture Results:   No growth at 48 hours ( @ 10:42)  Culture Results:   No growth at 48 hours ( @ 10:42)      RADIOLOGY:   < from: Xray Chest 1 View- PORTABLE-Urgent (Xray Chest 1 View- PORTABLE-Urgent .) (21 @ 14:50) >   EXAM:  XR CHEST PORTABLE URGENT 1V                          PROCEDURE DATE:  2021      INTERPRETATION:  AP chest, semierect, on 2021 at 1:50 PM. Patient is hypoxic and is positive for the Covid virus.    Heart magnified by technique.    Endotracheal tube, nasogastric tube, left jugular line remain.    There are persistent advanced bilateral infiltrates. The infiltrates have increased, particularly on the right compared to .    IMPRESSION: Advanced infiltrates somewhat increased from . The infiltrates are similar to .    RICCARDO GARCIA MD; Attending Radiologist  This document has been electronically signed. 2021  2:55PM    < end of copied text >      SUPPLEMENTAL O2: AC/VC  LINES: Peripheral, CVC  IVF: N  KWOK: Y  PPx: PPI, Heparin gtt  CONTACT: Y, COVID Patient is a 60y old  Female who presents with a chief complaint of SOB/COVID (2021 12:39)      BRIEF HOSPITAL COURSE:   59 yo f pmhx DM and HTN admitted  with COVID 19 with course complicated by worsening hypoxia, intubation, ARDS, shock, JER and DVT/PE.      : Intubated    : worsening hypoxia/hypotension, concern for PE received FD TPA    Events last 24 hours:   Remains intubated/sedated/paralyzed. Desatted this evening to low 80s.  Attempts to improve spo2 with change in flow and with recruitment maneuver with no improvement.  Peaks 43, plt 35      PAST MEDICAL & SURGICAL HISTORY:  Hyperlipidemia  Liver disease  (unable to take tylenol )  Hypertension  Diabetes  Diverticulitis  High cholesterol  HTN (hypertension)  DM (diabetes mellitus)  H/O:       Allergies  Allergy Status Unknown  No Known Allergies      FAMILY HISTORY:  No pertinent family history in first degree relatives  No pertinent family history in first degree relatives      Social History:   From home       Review of Systems:  Unable to obtain, intubated/sedated    Physical Examination:    General: Adult female, lying in bed, NAD     HEENT: NC/AT, ETT and OGT in place.     PULM: Symmetrical thorax expansion upon respiration.  Coarse to auscultation bilaterally, no significant sputum production    CVS: Regular rate and rhythm, no murmurs, rubs, or gallops appreciated    ABD: Soft, nondistended, nontender, normoactive bowel sounds, no masses appreciated    EXT: + edema, nontender    SKIN: Warm and well perfused, no rashes noted.    NEURO: Sedated      Medications:  meropenem  IVPB 500 milliGRAM(s) IV Intermittent every 12 hours  norepinephrine Infusion 0.02 MICROgram(s)/kG/Min IV Continuous <Continuous>  acetaminophen   Tablet .. 650 milliGRAM(s) Oral every 6 hours PRN  cisatracurium Infusion 3 MICROgram(s)/kG/Min IV Continuous <Continuous>  fentaNYL   Infusion 0.5 MICROgram(s)/kG/Hr IV Continuous <Continuous>  propofol Infusion 10 MICROgram(s)/kG/Min IV Continuous <Continuous>  heparin   Injectable 6000 Unit(s) IV Push every 6 hours PRN  heparin   Injectable 3000 Unit(s) IV Push every 6 hours PRN  heparin  Infusion.  Unit(s)/Hr IV Continuous <Continuous>  famotidine Injectable 20 milliGRAM(s) IV Push daily  dextrose 40% Gel 15 Gram(s) Oral once  dextrose 50% Injectable 25 Gram(s) IV Push once  dextrose 50% Injectable 25 Gram(s) IV Push once  glucagon  Injectable 1 milliGRAM(s) IntraMuscular once  insulin regular Infusion 6 Unit(s)/Hr IV Continuous <Continuous>  methylPREDNISolone sodium succinate Injectable 40 milliGRAM(s) IV Push every 6 hours  vasopressin Infusion 0.04 Unit(s)/Min IV Continuous <Continuous>  dextrose 5%. 1000 milliLiter(s) IV Continuous <Continuous>  dextrose 5%. 1000 milliLiter(s) IV Continuous <Continuous>  chlorhexidine 0.12% Liquid 15 milliLiter(s) Oral Mucosa every 12 hours  chlorhexidine 2% Cloths 1 Application(s) Topical <User Schedule>  chlorhexidine 4% Liquid 1 Application(s) Topical <User Schedule>      Mode: AC/ CMV (Assist Control/ Continuous Mandatory Ventilation)  RR (machine): 34  TV (machine): 450  FiO2: 100  PEEP: 12  MAP: 24  PIP: 41      ICU Vital Signs Last 24 Hrs  T(C): 36.8 (2021 15:00), Max: 36.8 (2021 15:00)  T(F): 98.3 (2021 15:00), Max: 98.3 (2021 15:00)  HR: 117 (2021 23:45) (73 - 124)  BP: 126/54 (2021 18:00) (121/57 - 158/76)  BP(mean): 71 (2021 18:00) (71 - 93)  ABP: 123/61 (2021 18:00) (99/56 - 179/83)  ABP(mean): 83 (2021 18:00) (73 - 120)  RR: 34 (2021 18:00) (34 - 34)  SpO2: 89% (2021 23:45) (86% - 96%)    Vital Signs Last 24 Hrs  T(C): 36.8 (2021 15:00), Max: 36.8 (2021 15:00)  T(F): 98.3 (2021 15:00), Max: 98.3 (2021 15:00)  HR: 117 (2021 23:45) (73 - 124)  BP: 126/54 (2021 18:00) (121/57 - 158/76)  BP(mean): 71 (2021 18:00) (71 - 93)  RR: 34 (2021 18:00) (34 - 34)  SpO2: 89% (2021 23:45) (86% - 96%)    ABG - ( 2021 11:26 )  pH, Arterial: 7.32  pH, Blood: x     /  pCO2: 52    /  pO2: 62    / HCO3: 26    / Base Excess: 1.5   /  SaO2: 93          I&O's Detail    10 Feb 2021 07:01  -  2021 07:00  --------------------------------------------------------  IN:    Cisatracurium: 157.2 mL    FentaNYL: 175.2 mL    Glucerna 1.5: 850 mL    Heparin Infusion: 121 mL    Insulin: 43 mL    Norepinephrine: 258.8 mL    Phenylephrine: 64.6 mL    PRBCs (Packed Red Blood Cells): 300 mL    Propofol: 210 mL    Vasopressin: 28.8 mL  Total IN: 2208.6 mL    OUT:    Indwelling Catheter - Urethral (mL): 522 mL  Total OUT: 522 mL  Total NET: 1686.6 mL      2021 07:01  -  2021 02:26  --------------------------------------------------------  IN:    Cisatracurium: 131 mL    FentaNYL: 146 mL    Glucerna 1.5: 500 mL    Heparin Infusion: 70 mL    Insulin: 41 mL    Norepinephrine: 36.9 mL    Propofol: 175 mL    Vasopressin: 9.6 mL  Total IN: 1109.5 mL    OUT:    Indwelling Catheter - Urethral (mL): 173 mL    Phenylephrine: 0 mL  Total OUT: 173 mL  Total NET: 936.5 mL      LABS:                        8.2    30.45 )-----------( 245      ( 2021 04:22 )             25.2     02-11    138  |  96<L>  |  103.0<H>  ----------------------------<  144<H>  4.6   |  24.0  |  4.13<H>    Ca    7.1<L>      2021 04:22  Phos  7.3       Mg     2.7         TPro  5.6<L>  /  Alb  1.9<L>  /  TBili  0.7  /  DBili  x   /  AST  40<H>  /  ALT  14  /  AlkPhos  87        CAPILLARY BLOOD GLUCOSE  POCT Blood Glucose.: 150 mg/dL (2021 01:44)      PTT - ( 2021 13:08 )  PTT:79.5 sec      CULTURES:  Culture Results:   No growth at 48 hours ( @ 10:42)  Culture Results:   No growth at 48 hours ( @ 10:42)      RADIOLOGY:   < from: Xray Chest 1 View- PORTABLE-Urgent (Xray Chest 1 View- PORTABLE-Urgent .) (21 @ 14:50) >   EXAM:  XR CHEST PORTABLE URGENT 1V                          PROCEDURE DATE:  2021      INTERPRETATION:  AP chest, semierect, on 2021 at 1:50 PM. Patient is hypoxic and is positive for the Covid virus.    Heart magnified by technique.    Endotracheal tube, nasogastric tube, left jugular line remain.    There are persistent advanced bilateral infiltrates. The infiltrates have increased, particularly on the right compared to .    IMPRESSION: Advanced infiltrates somewhat increased from . The infiltrates are similar to .    RICCARDO GARCIA MD; Attending Radiologist  This document has been electronically signed. 2021  2:55PM    < end of copied text >      SUPPLEMENTAL O2: AC/VC  LINES: Peripheral, CVC  IVF: N  KWOK: Y  PPx: PPI, Heparin gtt  CONTACT: Y, COVID

## 2021-02-11 NOTE — CHART NOTE - NSCHARTNOTEFT_GEN_A_CORE
Source: Patient [ ]  Family [ ]   other [ x]    Current Diet: Diet, NPO with Tube Feed:   Tube Feeding Modality: Orogastric  Glucerna 1.5 Sukhjinder (GLUCERNA1.5)  Total Volume for 24 Hours (mL): 1200  Continuous  Starting Tube Feed Rate {mL per Hour}: 10  Increase Tube Feed Rate by (mL): 10     Every 2 hours  Until Goal Tube Feed Rate (mL per Hour): 50  Tube Feed Duration (in Hours): 24  Tube Feed Start Time: 10:00 (02-10-21 @ 09:47)    Enteral /Parenteral Nutrition: Tube feeds running at goal rate of 50 ml/hr (x20 hrs) to provide 1000 ml, 1500 kcal, 83g protein, 759 ml free water, and 100% of RDIs for vitamins/minerals.    Current Weight:   (1/25) 160 lbs  No new weight  Noted with 1+ generalized edema, continue to trend    Pertinent Medications: MEDICATIONS  (STANDING):  chlorhexidine 0.12% Liquid 15 milliLiter(s) Oral Mucosa every 12 hours  chlorhexidine 2% Cloths 1 Application(s) Topical <User Schedule>  chlorhexidine 4% Liquid 1 Application(s) Topical <User Schedule>  cisatracurium Infusion 3 MICROgram(s)/kG/Min (13.1 mL/Hr) IV Continuous <Continuous>  dextrose 40% Gel 15 Gram(s) Oral once  dextrose 5%. 1000 milliLiter(s) (50 mL/Hr) IV Continuous <Continuous>  dextrose 5%. 1000 milliLiter(s) (100 mL/Hr) IV Continuous <Continuous>  dextrose 50% Injectable 25 Gram(s) IV Push once  dextrose 50% Injectable 25 Gram(s) IV Push once  famotidine Injectable 20 milliGRAM(s) IV Push daily  fentaNYL   Infusion 0.5 MICROgram(s)/kG/Hr (3.63 mL/Hr) IV Continuous <Continuous>  glucagon  Injectable 1 milliGRAM(s) IntraMuscular once  heparin  Infusion.  Unit(s)/Hr (13 mL/Hr) IV Continuous <Continuous>  insulin regular Infusion 6 Unit(s)/Hr (6 mL/Hr) IV Continuous <Continuous>  meropenem  IVPB 500 milliGRAM(s) IV Intermittent every 12 hours  methylPREDNISolone sodium succinate Injectable 40 milliGRAM(s) IV Push every 6 hours  norepinephrine Infusion 0.02 MICROgram(s)/kG/Min (2.72 mL/Hr) IV Continuous <Continuous>  propofol Infusion 10 MICROgram(s)/kG/Min (4.36 mL/Hr) IV Continuous <Continuous>  vasopressin Infusion 0.04 Unit(s)/Min (2.4 mL/Hr) IV Continuous <Continuous>    MEDICATIONS  (PRN):  acetaminophen   Tablet .. 650 milliGRAM(s) Oral every 6 hours PRN Temp greater or equal to 38C (100.4F), Mild Pain (1 - 3)  heparin   Injectable 6000 Unit(s) IV Push every 6 hours PRN For aPTT less than 40  heparin   Injectable 3000 Unit(s) IV Push every 6 hours PRN For aPTT between 40 - 57    Pertinent Labs: CBC Full  -  ( 11 Feb 2021 04:22 )  WBC Count : 30.45 K/uL  RBC Count : 2.86 M/uL  Hemoglobin : 8.2 g/dL  Hematocrit : 25.2 %  Platelet Count - Automated : 245 K/uL  Mean Cell Volume : 88.1 fl  Mean Cell Hemoglobin : 28.7 pg  Mean Cell Hemoglobin Concentration : 32.5 gm/dL  Auto Neutrophil # : 25.15 K/uL  Auto Lymphocyte # : 1.34 K/uL  Auto Monocyte # : 1.58 K/uL  Auto Eosinophil # : 0.00 K/uL  Auto Basophil # : 0.00 K/uL  Auto Neutrophil % : 79.1 %  Auto Lymphocyte % : 4.4 %  Auto Monocyte % : 5.2 %  Auto Eosinophil % : 0.0 %  Auto Basophil % : 0.0 %    02-11 Na138 mmol/L Glu 144 mg/dL<H> K+ 4.6 mmol/L Cr  4.13 mg/dL<H> .0 mg/dL<H> Phos 7.3 mg/dL<H> Alb 1.9 g/dL<L> PAB n/a       Skin: Intact per documentation    Nutrition focused physical exam not conducted at this time due to COVID isolation precautions- found signs of malnutrition [ ]absent [ ]present    Subcutaneous fat loss: [ ] Orbital fat pads region, [ ]Buccal fat region, [ ]Triceps region,  [ ]Ribs region    Muscle wasting: [ ]Temples region, [ ]Clavicle region, [ ]Shoulder region, [ ]Scapula region, [ ]Interosseous region,  [ ]thigh region, [ ]Calf region    Estimated Needs:   [ x] no change since previous assessment  [ ] recalculated:     Current Nutrition Diagnosis: Pt remains at high nutrition risk secondary to malnutrition (moderate, acute) related to inability to meet sufficient protein-energy in setting of hypoxemic respiratory failure secondary to COVID-19 viral pneumonia now requiring intubation, ARDS, shock, and acute LLE DVT as evidenced by meeting <75% nutrient needs >/7 days and 1+ generalized edema. Pt now intubated/sedated. Previous RD note pt was on BiPAP, NPO. Aware paralytic discontinued, tube feeds started yesterday and now running at goal rate of 50 ml/hr. Pt receiving Propofol at 17.5 ml/hr (x24 hrs would provide an additional ~462 kcal). Last BM 2/10. RD to follow up.    Recommendations:   1) Continue tube feeds as ordered at this time; additional free water per MD discretion.  2) Rx: MVI, vitamin C, and vitamin C supplementation as feasible.  3) Monitor tube feed tolerance.  4) Obtain daily weights to monitor trends.     Monitoring and Evaluation:   [ ] PO intake [x ] Tolerance to diet prescription [X] Weights  [X] Follow up per protocol [X] Labs.

## 2021-02-11 NOTE — PROGRESS NOTE ADULT - ASSESSMENT
NEURO:  CV:  RESP:  RENAL:  GI:  ENDO:  ID: COVID 19 completed   HEME: Heparin gtt for ac in setting of LE DVT.   DISPO: Full code.   59 yo f pmhx DM and HTN admitted 1/22 with COVID 19 with course complicated by worsening hypoxic resp failure, intubation, ARDS, shock, JER and DVT/PE.      NEURO: Sedated on propofol/fentanyl gtt.  Nimbex for paralytic therapy.    CV: Shock state requiring vasopressor therapy, actively titrating levophed for MAP >65.  Vasopressin for adjunctive therapy.    RESP: ARDS, ac/vc 6cc/kg tv lung protective strategies, actively titrating fio2 and peep for spo2 >88%, wean as tolerated.    RENAL: JER, worsening, avoid nephrotoxic meds, renally dose meds, trend urine output, bun/cr and electrolytes. Replace lytes as needed.    GI: NPO on TF   ENDO: Hyperglycemia on insulin gtt.  POCT q1hr, trend bmp.   ID: COVID 19 completed remdesivir/decadron.  On meropenem for superimposed bacterial pneumonia. Restarted on steroid thearpy.   HEME: Heparin gtt for ac in setting of LE DVT.   DISPO: Full code.      Critical Care time: 45 mins assessing presenting problems of acute illness that poses high probability of life threatening deterioration or end organ damage/dysfunction.  Medical decision making including Initiating plan of care, reviewing data, reviewing radiology, discussing with multidisciplinary team, non inclusive of procedures, discussing goals of care with patient/family

## 2021-02-11 NOTE — DIETITIAN NUTRITION RISK NOTIFICATION - TREATMENT: THE FOLLOWING DIET HAS BEEN RECOMMENDED
Diet, NPO with Tube Feed:   Tube Feeding Modality: Orogastric  Glucerna 1.5 Sukhjinder (GLUCERNA1.5)  Total Volume for 24 Hours (mL): 1200  Continuous  Starting Tube Feed Rate {mL per Hour}: 10  Increase Tube Feed Rate by (mL): 10     Every 2 hours  Until Goal Tube Feed Rate (mL per Hour): 50  Tube Feed Duration (in Hours): 24  Tube Feed Start Time: 10:00 (02-10-21 @ 09:47) [Active]

## 2021-02-11 NOTE — PROVIDER CONTACT NOTE (CHANGE IN STATUS NOTIFICATION) - ACTION/TREATMENT ORDERED:
CXR ordered
MD to see pt
fluid bolus ordered
EKG ordered
MD to come to bedside to assess pt
Morphine given per EMAR, provider aware and has plans to speak with patient regarding goals of care
continue to monitor
MD aware, continue to monitor
morphine ordered

## 2021-02-11 NOTE — PROGRESS NOTE ADULT - ASSESSMENT
1) ATN  2) Respiratory failure  3) COVID 19 PNA  4) Anemia- s/p PRBC transfusion  5) metabolic acidosis    Worsening SCr, UOP decreasing  Agree with Lasix IV  RRT if no improvement in renal indices.

## 2021-02-11 NOTE — DIETITIAN NUTRITION RISK NOTIFICATION - ADDITIONAL COMMENTS/DIETITIAN RECOMMENDATIONS
Continue tube feeds as ordered at this time; additional free water per MD discretion.  Rx: MVI, vitamin C, and vitamin C supplementation as feasible.  Monitor tube feed tolerance.  Obtain daily weights to monitor trends.

## 2021-02-11 NOTE — PROGRESS NOTE ADULT - ASSESSMENT
Still requiring 100% fio2, PEEP 12    EXAM:   intubated and sedated  reg rhythm  bilat air entry  abd soft nt  trace edema    cxr- severe bilat interstitial and airspace opacities     IMPRESSION/ASSESSMENT & PLAN:   61 yo female with DM, HTN, admitted 1/25 with covid 19 viral pneumonia, now with acute respiratory failure/ARDS, shock.    LLE DVT/ possible PE  received TPA 2/9  heparin drip    Acute respiratory failure  Completed decadron and remdesivir.  - low vt ventilation  - started again on steroids - solu-medrol on 2/9  - meropenem for possible bacterial infection started on 2/9    JER/metabolic acidodis  - nephrology following   - seems volume overloaded, will give trial of lasix    Distributive shock possibly sepsis  - empiric abx  - cultures negative so far    Anemia  - will transfuse 1 unit PRBC since Hb trending down, at request of nephrology    DM  - insulin infusion    DVT proph: hep  GI proph: pepcid  diet: tube feeds  Daughter Mi Jolley updated by phone, gave consent for transfusion  Prognosis guarded.   Still requiring 100% fio2, PEEP 12  remains on paralytic    EXAM:   intubated and sedated  reg rhythm  bilat air entry  abd soft nt  trace edema    cxr- severe bilat interstitial and airspace opacities     IMPRESSION/ASSESSMENT & PLAN:   61 yo female with DM, HTN, admitted 1/25 with covid 19 viral pneumonia, now with acute respiratory failure/ARDS, shock.    LLE DVT/ possible PE  received TPA 2/9  heparin drip    Acute respiratory failure  Completed decadron and remdesivir.  - low vt ventilation  - started again on steroids - solu-medrol on 2/9  - meropenem for possible bacterial infection started on 2/9    JER/metabolic acidodis  - nephrology following   - seems volume overloaded, will give trial of lasix    Distributive shock possibly sepsis  - empiric abx  - cultures negative so far  - still requiring levophed    Anemia  - received 1 unit PRBC on 2/10    DM  - insulin infusion    DVT proph: hep  GI proph: pepcid  diet: tube feeds  Prognosis guarded

## 2021-02-11 NOTE — PROGRESS NOTE ADULT - SUBJECTIVE AND OBJECTIVE BOX
On Admission  21 (17d)  HPI:  59 y/o F w/ a PMH of DMII, HTN came in c/o sob worsening over the past week and associated naussea and NBNB vomiting x 2 days.  Pt was recently diagnosed as an outpt w/ COVID on  and was told by PCP if her O2 sat went <92 she should go to the hospital.  Pt came to the ED 2 days ago and O2 sat was noted to be >90% and was discharged home.  Pts SOB did not improve and she came back in today.  Pt also reports subjective fevers/chills at home but denies cp, palpitations, abd pain, lower extremity swelling/pain. (2021 18:19)    PAST MEDICAL & SURGICAL HISTORY:  Hyperlipidemia    Liver disease  (unable to take tylenol )    Hypertension    Diabetes    Diverticulitis    High cholesterol    HTN (hypertension)    DM (diabetes mellitus)    H/O:         Antimicrobial:  meropenem  IVPB 500 milliGRAM(s) IV Intermittent every 12 hours    Cardiovascular:  norepinephrine Infusion 0.02 MICROgram(s)/kG/Min IV Continuous <Continuous>    Pulmonary:    Hematalogic:  heparin   Injectable 6000 Unit(s) IV Push every 6 hours PRN  heparin   Injectable 3000 Unit(s) IV Push every 6 hours PRN  heparin  Infusion.  Unit(s)/Hr IV Continuous <Continuous>    Other:  acetaminophen   Tablet .. 650 milliGRAM(s) Oral every 6 hours PRN  chlorhexidine 0.12% Liquid 15 milliLiter(s) Oral Mucosa every 12 hours  chlorhexidine 2% Cloths 1 Application(s) Topical <User Schedule>  chlorhexidine 4% Liquid 1 Application(s) Topical <User Schedule>  cisatracurium Infusion 3 MICROgram(s)/kG/Min IV Continuous <Continuous>  dextrose 40% Gel 15 Gram(s) Oral once  dextrose 5%. 1000 milliLiter(s) IV Continuous <Continuous>  dextrose 5%. 1000 milliLiter(s) IV Continuous <Continuous>  dextrose 50% Injectable 25 Gram(s) IV Push once  dextrose 50% Injectable 25 Gram(s) IV Push once  famotidine Injectable 20 milliGRAM(s) IV Push daily  fentaNYL   Infusion 0.5 MICROgram(s)/kG/Hr IV Continuous <Continuous>  glucagon  Injectable 1 milliGRAM(s) IntraMuscular once  insulin regular Infusion 6 Unit(s)/Hr IV Continuous <Continuous>  methylPREDNISolone sodium succinate Injectable 40 milliGRAM(s) IV Push every 6 hours  propofol Infusion 10 MICROgram(s)/kG/Min IV Continuous <Continuous>  vasopressin Infusion 0.04 Unit(s)/Min IV Continuous <Continuous>      Drug Dosing Weight  Height (cm): 167.6 (2021 15:31)  Weight (kg): 72.6 (2021 15:31)  BMI (kg/m2): 25.8 (2021 15:31)  BSA (m2): 1.82 (2021 15:31)    T(C): 36.6 (21 @ 11:00), Max: 36.6 (02-10-21 @ 15:00)  HR: 91 (21 @ 11:00)  BP: --  BP(mean): --  ABP: 125/67 (21 @ 11:00)  ABP(mean): 89 (21 @ 11:00)  RR: 34 (21 @ 11:00)  SpO2: 92% (21 @ 11:00)    ABG - ( 2021 11:26 )  pH, Arterial: 7.32  pH, Blood: x     /  pCO2: 52    /  pO2: 62    / HCO3: 26    / Base Excess: 1.5   /  SaO2: 93                    02-10 @ 07:01  -   @ 07:00  --------------------------------------------------------  IN: 2208.6 mL / OUT: 522 mL / NET: 1686.6 mL        Mode: AC/ CMV (Assist Control/ Continuous Mandatory Ventilation)  RR (machine): 34  TV (machine): 420  FiO2: 10  PEEP: 12  MAP: 25  PIP: 40        LABS:  CBC Full  -  ( 2021 04:22 )  WBC Count : 30.45 K/uL  RBC Count : 2.86 M/uL  Hemoglobin : 8.2 g/dL  Hematocrit : 25.2 %  Platelet Count - Automated : 245 K/uL  Mean Cell Volume : 88.1 fl  Mean Cell Hemoglobin : 28.7 pg  Mean Cell Hemoglobin Concentration : 32.5 gm/dL  Auto Neutrophil # : 25.15 K/uL  Auto Lymphocyte # : 1.34 K/uL  Auto Monocyte # : 1.58 K/uL  Auto Eosinophil # : 0.00 K/uL  Auto Basophil # : 0.00 K/uL  Auto Neutrophil % : 79.1 %  Auto Lymphocyte % : 4.4 %  Auto Monocyte % : 5.2 %  Auto Eosinophil % : 0.0 %  Auto Basophil % : 0.0 %        138  |  96<L>  |  103.0<H>  ----------------------------<  144<H>  4.6   |  24.0  |  4.13<H>    Ca    7.1<L>      2021 04:22  Phos  7.3       Mg     2.7         TPro  5.6<L>  /  Alb  1.9<L>  /  TBili  0.7  /  DBili  x   /  AST  40<H>  /  ALT  14  /  AlkPhos  87      PTT - ( 2021 04:23 )  PTT:129.3 sec      ____________________________________________________________________________________________________

## 2021-02-11 NOTE — PROGRESS NOTE ADULT - SUBJECTIVE AND OBJECTIVE BOX
[facility] DIVISION OF KIDNEY DISEASES AND HYPERTENSION -- FOLLOW UP NOTE  --------------------------------------------------------------------------------  Chief Complaint: lakisha    24 hour events/subjective:  remains intubated/ sedated        PAST HISTORY  --------------------------------------------------------------------------------  No significant changes to PMH, PSH, FHx, SHx, unless otherwise noted    ALLERGIES & MEDICATIONS  --------------------------------------------------------------------------------  Allergies    Allergy Status Unknown  No Known Allergies    Intolerances      Standing Inpatient Medications  chlorhexidine 0.12% Liquid 15 milliLiter(s) Oral Mucosa every 12 hours  chlorhexidine 2% Cloths 1 Application(s) Topical <User Schedule>  chlorhexidine 4% Liquid 1 Application(s) Topical <User Schedule>  cisatracurium Infusion 3 MICROgram(s)/kG/Min IV Continuous <Continuous>  dextrose 40% Gel 15 Gram(s) Oral once  dextrose 5%. 1000 milliLiter(s) IV Continuous <Continuous>  dextrose 5%. 1000 milliLiter(s) IV Continuous <Continuous>  dextrose 50% Injectable 25 Gram(s) IV Push once  dextrose 50% Injectable 25 Gram(s) IV Push once  famotidine Injectable 20 milliGRAM(s) IV Push daily  fentaNYL   Infusion 0.5 MICROgram(s)/kG/Hr IV Continuous <Continuous>  furosemide   Injectable 80 milliGRAM(s) IV Push once  glucagon  Injectable 1 milliGRAM(s) IntraMuscular once  heparin  Infusion.  Unit(s)/Hr IV Continuous <Continuous>  insulin regular Infusion 6 Unit(s)/Hr IV Continuous <Continuous>  meropenem  IVPB 500 milliGRAM(s) IV Intermittent every 12 hours  methylPREDNISolone sodium succinate Injectable 40 milliGRAM(s) IV Push every 6 hours  norepinephrine Infusion 0.02 MICROgram(s)/kG/Min IV Continuous <Continuous>  propofol Infusion 10 MICROgram(s)/kG/Min IV Continuous <Continuous>  vasopressin Infusion 0.04 Unit(s)/Min IV Continuous <Continuous>    PRN Inpatient Medications  acetaminophen   Tablet .. 650 milliGRAM(s) Oral every 6 hours PRN  heparin   Injectable 6000 Unit(s) IV Push every 6 hours PRN  heparin   Injectable 3000 Unit(s) IV Push every 6 hours PRN      REVIEW OF SYSTEMS  --------------------------------------------------------------------------------  unable to obtain        VITALS/PHYSICAL EXAM  --------------------------------------------------------------------------------  T(C): 36.6 (02-11-21 @ 11:00), Max: 36.6 (02-10-21 @ 15:00)  HR: 95 (02-11-21 @ 12:00) (73 - 95)  BP: --  RR: 34 (02-11-21 @ 12:00) (32 - 34)  SpO2: 93% (02-11-21 @ 12:00) (92% - 96%)  Wt(kg): --        02-10-21 @ 07:01  -  02-11-21 @ 07:00  --------------------------------------------------------  IN: 2208.6 mL / OUT: 522 mL / NET: 1686.6 mL    02-11-21 @ 07:01  -  02-11-21 @ 12:39  --------------------------------------------------------  IN: 469.3 mL / OUT: 51 mL / NET: 418.3 mL      Physical Exam:  	Due to the nature of the pt's isolation status, no bedside physical exam done to limit spread of infection. Objective data was reviewed in detail.    LABS/STUDIES  --------------------------------------------------------------------------------              8.2    30.45 >-----------<  245      [02-11-21 @ 04:22]              25.2     138  |  96  |  103.0  ----------------------------<  144      [02-11-21 @ 04:22]  4.6   |  24.0  |  4.13        Ca     7.1     [02-11-21 @ 04:22]      Mg     2.7     [02-11-21 @ 04:22]      Phos  7.3     [02-11-21 @ 04:22]    TPro  5.6  /  Alb  1.9  /  TBili  0.7  /  DBili  x   /  AST  40  /  ALT  14  /  AlkPhos  87  [02-11-21 @ 04:22]      PTT: 129.3      [02-11-21 @ 04:23]          [02-09-21 @ 18:43]        [02-09-21 @ 18:43]    Creatinine Trend:  SCr 4.13 [02-11 @ 04:22]  SCr 4.09 [02-10 @ 20:18]  SCr 3.51 [02-09 @ 18:43]  SCr 3.12 [02-09 @ 02:45]  SCr 3.11 [02-08 @ 13:42]    Urinalysis - [01-28-21 @ 20:13]      Color Yellow / Appearance Slightly Turbid / SG 1.015 / pH 6.0      Gluc 250 / Ketone Negative  / Bili Negative / Urobili Negative       Blood Small / Protein 30 / Leuk Est Negative / Nitrite Negative      RBC 0-2 / WBC 3-5 / Hyaline  / Gran  / Sq Epi  / Non Sq Epi Occasional / Bacteria Many      Iron 16, TIBC 320, %sat 5      [01-26-21 @ 07:57]  Ferritin 2568      [02-09-21 @ 18:43]  Lipid: chol --, , HDL --, LDL --      [02-11-21 @ 04:22]    HCV 0.08, Nonreact      [01-26-21 @ 13:28]

## 2021-02-12 NOTE — PROGRESS NOTE ADULT - SUBJECTIVE AND OBJECTIVE BOX
Patient is a 60y old  Female who presents with a chief complaint of SOB/COVID (2021 13:50)      BRIEF HOSPITAL COURSE:  59 y/o female pmhx HTN, DM2 admitted on  w/ COVID-19 pneumonia. Hospital course complicated by acute hypoxic respiratory failure/ ARDS ( intubated on ), shock, JER and DVT, presumed PE s/p TPA      Events last 24 hours:  Remains sedated/ chemically paralyzed on 100% FiO2. Shock state resolved now off vasopressors. JER, worsening. Oliguric overnight. Plan for first HD session today.     PAST MEDICAL & SURGICAL HISTORY:  Hyperlipidemia    Liver disease  (unable to take tylenol )    Hypertension    Diabetes    Diverticulitis    High cholesterol    HTN (hypertension)    DM (diabetes mellitus)    H/O:           Hosp day #18d    Vent day #  Mode: AC/ CMV (Assist Control/ Continuous Mandatory Ventilation)  RR (machine): 34  TV (machine): 420  FiO2: 100  PEEP: 12  MAP: 22  PIP: 37        Vital signs / Reviewed and Physical Exam Performed where pertinent and urgently required    Lab / Radiology  studies / ABG / Meds -  reviewed and interpreted into the assessment and treatment plan.      Assessment/Plan/Therapeutic interventions    Assessment:  1. Acute hypoxic respiratory failure  2. ARDS  3. COVID-19 viral pneumonia  4. Shock  5. JER  6. DVT/ PE.     Neuro - Sedated on propofol and fentanyl Chemically paralyzed on nimbex.     CV -  Shock state resolved. Now off vasopressors >24hrs. Monitor end points of perfusion     Pulm -  ARDS-NET 4-6cc/kg IBW TV as able to maintain plateau pressures <30. Titrating FiO2 to maintain O2 sat >88%. Allow permissive hypercapnia. repeat ABG in am. Unable to titrate FiO2 down further today on 100% and PEEP12.                Prone ventilation consideration as feasible  Pa02/Fi02 < 150 on Fi02 >60% and PEEP at least 5                 Vent bundle Reviewed . Solumderol 40mg q8hr. Finished course of Decadron/ Remdesivir.     GI -  Pepcid BID. Tolerating tube feeds         Renal - JER, Oliguric. For first HD session today. Avoid Nephrotoxins. Renally dose medications.  Plan for conventional HD today now off pressors. Cho Catheter. Nephrology following.     Heme -  Full AC w/ heparin gtt for DVT. s/p TPA for suspected PE.  - Anemia, s/p 2 PRBC. H/H stable. No signs of bleeding.,     ID - On meropenum for suspected superimposed bacterial PNA. Cultures are negative to date.     Endo - Insulin gtt for aggressive glycemic control. accu checks q1hr     Family updated multiple times today by Dr. Haddad and PA student. One family member to come to bedside today to visit today       COVID 19 specific considerations and therapeutic  options based on the available and rapidly changing literature    Goals of care considerations:  Ongoing assessment for patient specific treatment options based on progression or decline.  I have involved the family with updates and requests in guidance for medical decision making.          42  Minutes of critical care time spent in the management of this critically ill COVID-19 patient/PUI patient with continuous assessments and interventions based on the interpretation of multiple databases.

## 2021-02-12 NOTE — PROGRESS NOTE ADULT - SUBJECTIVE AND OBJECTIVE BOX
[facility] DIVISION OF KIDNEY DISEASES AND HYPERTENSION -- FOLLOW UP NOTE  --------------------------------------------------------------------------------  Chief Complaint:  ATN  24 hour events/subjective:  Seen/examined this AM in Covid unit  Worsening renal indices;      PAST HISTORY  --------------------------------------------------------------------------------  No significant changes to PMH, PSH, FHx, SHx, unless otherwise noted    ALLERGIES & MEDICATIONS  --------------------------------------------------------------------------------  Allergies    Allergy Status Unknown  No Known Allergies    Intolerances      Standing Inpatient Medications  chlorhexidine 0.12% Liquid 15 milliLiter(s) Oral Mucosa every 12 hours  chlorhexidine 2% Cloths 1 Application(s) Topical <User Schedule>  chlorhexidine 4% Liquid 1 Application(s) Topical <User Schedule>  cisatracurium Infusion 3 MICROgram(s)/kG/Min IV Continuous <Continuous>  dextrose 40% Gel 15 Gram(s) Oral once  dextrose 5%. 1000 milliLiter(s) IV Continuous <Continuous>  dextrose 5%. 1000 milliLiter(s) IV Continuous <Continuous>  dextrose 50% Injectable 25 Gram(s) IV Push once  dextrose 50% Injectable 25 Gram(s) IV Push once  famotidine Injectable 20 milliGRAM(s) IV Push daily  fentaNYL   Infusion 0.5 MICROgram(s)/kG/Hr IV Continuous <Continuous>  glucagon  Injectable 1 milliGRAM(s) IntraMuscular once  heparin  Infusion.  Unit(s)/Hr IV Continuous <Continuous>  insulin regular Infusion 6 Unit(s)/Hr IV Continuous <Continuous>  meropenem  IVPB 500 milliGRAM(s) IV Intermittent every 12 hours  methylPREDNISolone sodium succinate Injectable 40 milliGRAM(s) IV Push every 6 hours  norepinephrine Infusion 0.02 MICROgram(s)/kG/Min IV Continuous <Continuous>  propofol Infusion 10 MICROgram(s)/kG/Min IV Continuous <Continuous>  vasopressin Infusion 0.04 Unit(s)/Min IV Continuous <Continuous>    PRN Inpatient Medications  acetaminophen   Tablet .. 650 milliGRAM(s) Oral every 6 hours PRN  heparin   Injectable 6000 Unit(s) IV Push every 6 hours PRN  heparin   Injectable 3000 Unit(s) IV Push every 6 hours PRN      REVIEW OF SYSTEMS  --------------------------------------------------------------------------------  Unable to obtain    VITALS/PHYSICAL EXAM  --------------------------------------------------------------------------------  T(C): 36.4 (02-12-21 @ 11:00), Max: 37.1 (02-11-21 @ 20:00)  HR: 100 (02-12-21 @ 13:00) (100 - 126)  BP: 113/66 (02-12-21 @ 13:00) (112/52 - 158/76)  RR: 34 (02-12-21 @ 13:00) (34 - 34)  SpO2: 90% (02-12-21 @ 13:00) (86% - 93%)  Wt(kg): --        02-11-21 @ 07:01  -  02-12-21 @ 07:00  --------------------------------------------------------  IN: 2112.9 mL / OUT: 733 mL / NET: 1379.9 mL    02-12-21 @ 07:01  -  02-12-21 @ 13:51  --------------------------------------------------------  IN: 282 mL / OUT: 156 mL / NET: 126 mL      Physical Exam:  Vital signs / Reviewed and Physical Exam Performed where pertinent and urgently required    LABS/STUDIES  --------------------------------------------------------------------------------              8.4    40.82 >-----------<  225      [02-12-21 @ 04:28]              26.0     139  |  95  |  119.0  ----------------------------<  126      [02-12-21 @ 04:28]  5.0   |  23.0  |  4.86        Ca     8.0     [02-12-21 @ 04:28]      Mg     2.8     [02-12-21 @ 04:28]      Phos  9.0     [02-12-21 @ 04:28]    TPro  5.6  /  Alb  1.9  /  TBili  0.7  /  DBili  x   /  AST  40  /  ALT  14  /  AlkPhos  87  [02-11-21 @ 04:22]      PTT: 72.0       [02-12-21 @ 04:28]      Creatinine Trend:  SCr 4.86 [02-12 @ 04:28]  SCr 4.13 [02-11 @ 04:22]  SCr 4.09 [02-10 @ 20:18]  SCr 3.51 [02-09 @ 18:43]  SCr 3.12 [02-09 @ 02:45]    Urinalysis - [01-28-21 @ 20:13]      Color Yellow / Appearance Slightly Turbid / SG 1.015 / pH 6.0      Gluc 250 / Ketone Negative  / Bili Negative / Urobili Negative       Blood Small / Protein 30 / Leuk Est Negative / Nitrite Negative      RBC 0-2 / WBC 3-5 / Hyaline  / Gran  / Sq Epi  / Non Sq Epi Occasional / Bacteria Many      Iron 16, TIBC 320, %sat 5      [01-26-21 @ 07:57]  Ferritin 2568      [02-09-21 @ 18:43]  Lipid: chol --, , HDL --, LDL --      [02-11-21 @ 04:22]    HCV 0.08, Nonreact      [01-26-21 @ 13:28]

## 2021-02-12 NOTE — PROGRESS NOTE ADULT - ASSESSMENT
1) ATN  2) Respiratory failure  3) COVID 19 RUTH  4) Anemia  5) metabolic acidosis    Worsening uremia   Worsening renal failure; Cr 4.86  Plan for HD; line being placed by MICU  HD # 1 today;  Plan for HD again tomorrow  Poor prognosis    dw Dr Haddad

## 2021-02-13 NOTE — CHART NOTE - NSCHARTNOTEFT_GEN_A_CORE
Daughter Mi called and updated on todays events. All questions answered.  We had a long discussion about the prognosis of her mother and lack of improvement she has made still requiring 100% FiO2 and CRRT.   She kept referencing that miracles happen every day and they believe her mother is the next miracle. She kept referencing seeing on the news a 100 year old man made it off the vent from COVID-19.   Also mentioned that her and her family, will " never ever" give up on her coming home.

## 2021-02-13 NOTE — PROGRESS NOTE ADULT - SUBJECTIVE AND OBJECTIVE BOX
HPI: ***, admitted with COVID-19 viral pneumonia, with acute hypoxemic respiratory failure, ARDS, secondary to severe sepsis viral COVID 19 pneumonia, septic shock, complicated by ATN    Events last 24 hours:   Sedated on ***, and Paralyzed with Nimbex      Hosp day #  Vent day #      Vital signs reviewed and physical exam performed where pertinent and urgently required.    Lab/radiology studies/ABG/meds reviewed and interpreted into the assessment and treatment plan.        Assessment:    Plan/Therapeutic interventions:    Neuro: Deep sedation and neuromuscular blockade as needed to facilitate ventilation and improve compliance to optimize oxygenation. On prop/fentanyl and nimbex gtt    CV: Off both pressors >24 hours at this point. PRN pressors, actively titrating to maintain MAP >65-70. Monitoring end points of perfusion.    Pulm: Low Tv lung protective strategy 4-8 cc/kg IBW. Manipulating vent to maintain paO2 55-80 and pH >7.15 with permissive hypercapnea. Prone ventilation consideration. Desaturating to mid 80s, increased PEEP to 14 with improvement. On 100%/+14 now.     GI: PPI for GI ppx. Was on max tube feeds @ goal rate of 50 on nimbex, will turn off for now. Consider restarting @ trickle rate.     Renal: On HD, received 2 hours yesterday, no UF. Nephro following.     Heme: s/p TPA for presumed PE. On Heparin gtt (LLE DVT)    ID: Judicious use of antibiotics based on biomarkers and culture data, trending fever curve. High risk for HAP and VAP.    Endo: Aggressive glycemic control to keep FS glucose to <180mg/dl while critically ill. On insulin gtt, BS in 130s, transition to lantus + sliding scale.     COVID 19 specific considerations and therapeutic options based on the available and rapidly changing literature.    Dispo:    *** minutes of critical care time spent in the management of this critically ill COVID-19 patient suffering from multisystem organ failure with continuous assessments and interventions based on the interpretation of multiple databases. Critical care time not inclusive of independent time spent on procedures performed. HPI:   Pt is a 61 y/o female with PMHX of HTN, DM2 admitted with covid 19 viral pneumonia on 1/25. Course c/b by progressive hypoxic respiratory failure and ARDS prompting intubation on 2/6. Course further c/b by shock, JER requiring HD, LE DVT and presumed PE s/p TPA.    Events last 24 hours:   Sedated on prop and fentanyl, paralyzed on nimbex, s/p HD w/ no UF late last evening. On 100%/+12, desaturating to mid 80s, increased PEEP to +14. Tube feeds held. BP stable.       Vital signs reviewed and physical exam performed where pertinent and urgently required.    Lab/radiology studies/ABG/meds reviewed and interpreted into the assessment and treatment plan.      Assessment:  1. Acute hypoxic respiratory failure  2. ARDS  3. COVID 19 Viral pneumonia  4. Shock- resolved  5. JER - on HD  6. DVT/PE    Plan/Therapeutic interventions:    Neuro: Deep sedation and neuromuscular blockade as needed to facilitate ventilation and improve compliance to optimize oxygenation. On prop/fentanyl and nimbex gtt    CV: Off both pressors >24 hours at this point. PRN pressors, actively titrating to maintain MAP >65-70. Monitoring end points of perfusion.    Pulm: Low Tv lung protective strategy 4-8 cc/kg IBW. Manipulating vent to maintain paO2 55-80 and pH >7.15 with permissive hypercapnea. Prone ventilation consideration. Desaturating to mid 80s, increased PEEP to 14 with improvement. On 100%/+14 now. ABG in AM    GI: PPI for GI ppx. Was on max tube feeds @ goal rate of 50 on nimbex, will turn off for now. Consider restarting @ trickle rate.     Renal: On HD, received 2 hours yesterday, no UF. Nephro following.     Heme: s/p TPA for presumed PE. On Heparin gtt (LLE DVT)    ID: Judicious use of antibiotics based on biomarkers and culture data, trending fever curve. High risk for HAP and VAP.    Endo: Aggressive glycemic control to keep FS glucose to <180mg/dl while critically ill. On insulin gtt, BS in 130s, transition to lantus + sliding scale.     COVID 19 specific considerations and therapeutic options based on the available and rapidly changing literature.    Dispo: Full code. Critically ill.     36 minutes of critical care time spent in the management of this critically ill COVID-19 patient suffering from multisystem organ failure with continuous assessments and interventions based on the interpretation of multiple databases. Critical care time not inclusive of independent time spent on procedures performed.

## 2021-02-13 NOTE — PROGRESS NOTE ADULT - ASSESSMENT
1) ATN  2) Respiratory failure  3) COVID 19 RUTH  4) Anemia  5) metabolic acidosis    Stopping intermittent HD  Starting CVVHDF  Orders in chart  RN at bedside aware;  dw Dr Nevarez

## 2021-02-13 NOTE — PROGRESS NOTE ADULT - SUBJECTIVE AND OBJECTIVE BOX
[facility] DIVISION OF KIDNEY DISEASES AND HYPERTENSION -- HEMODIALYSIS NOTE  --------------------------------------------------------------------------------  Chief Complaint: ATN/Ongoing hemodialysis requirement    24 hour events/subjective:  Pt seen/examined on intermittent HD;  During my presence; O2 saturation dropped to 84% and pressor requirements increased;  Will discontinue iHD and begin CVVHDF given acute instability of patient;      PAST HISTORY  --------------------------------------------------------------------------------  No significant changes to PMH, PSH, FHx, SHx, unless otherwise noted    ALLERGIES & MEDICATIONS  --------------------------------------------------------------------------------  Allergies    Allergy Status Unknown  No Known Allergies    Intolerances      Standing Inpatient Medications  chlorhexidine 0.12% Liquid 15 milliLiter(s) Oral Mucosa every 12 hours  chlorhexidine 2% Cloths 1 Application(s) Topical <User Schedule>  chlorhexidine 4% Liquid 1 Application(s) Topical <User Schedule>  cisatracurium Infusion 3 MICROgram(s)/kG/Min IV Continuous <Continuous>  CRRT Treatment    <Continuous>  dextrose 40% Gel 15 Gram(s) Oral once  dextrose 5%. 1000 milliLiter(s) IV Continuous <Continuous>  dextrose 5%. 1000 milliLiter(s) IV Continuous <Continuous>  dextrose 50% Injectable 25 Gram(s) IV Push once  dextrose 50% Injectable 25 Gram(s) IV Push once  famotidine Injectable 20 milliGRAM(s) IV Push daily  fentaNYL   Infusion 0.5 MICROgram(s)/kG/Hr IV Continuous <Continuous>  glucagon  Injectable 1 milliGRAM(s) IntraMuscular once  heparin  Infusion.  Unit(s)/Hr IV Continuous <Continuous>  insulin glargine Injectable (LANTUS) 15 Unit(s) SubCutaneous every morning  insulin lispro (ADMELOG) corrective regimen sliding scale   SubCutaneous every 6 hours  meropenem  IVPB 500 milliGRAM(s) IV Intermittent every 24 hours  methylPREDNISolone sodium succinate Injectable 40 milliGRAM(s) IV Push every 6 hours  norepinephrine Infusion 0.05 MICROgram(s)/kG/Min IV Continuous <Continuous>  Phoxillum Filtration BK 4 / 2.5 5000 milliLiter(s) CRRT <Continuous>  Phoxillum Filtration BK 4 / 2.5 5000 milliLiter(s) CRRT <Continuous>  Phoxillum Filtration BK 4 / 2.5 5000 milliLiter(s) CRRT <Continuous>  propofol Infusion 10 MICROgram(s)/kG/Min IV Continuous <Continuous>    PRN Inpatient Medications  acetaminophen   Tablet .. 650 milliGRAM(s) Oral every 6 hours PRN  heparin   Injectable 6000 Unit(s) IV Push every 6 hours PRN  heparin   Injectable 3000 Unit(s) IV Push every 6 hours PRN      REVIEW OF SYSTEMS  --------------------------------------------------------------------------------  Unable to obtain    VITALS/PHYSICAL EXAM  --------------------------------------------------------------------------------  T(C): 36.2 (02-13-21 @ 10:50), Max: 36.6 (02-13-21 @ 07:54)  HR: 111 (02-13-21 @ 12:30) (99 - 114)  BP: 102/49 (02-13-21 @ 12:30) (102/49 - 150/69)  RR: 36 (02-13-21 @ 12:30) (34 - 36)  SpO2: 92% (02-13-21 @ 12:30) (82% - 98%)  Wt(kg): --        02-12-21 @ 07:01  -  02-13-21 @ 07:00  --------------------------------------------------------  IN: 746.8 mL / OUT: 364 mL / NET: 382.8 mL    02-13-21 @ 07:01  -  02-13-21 @ 12:39  --------------------------------------------------------  IN: 286.8 mL / OUT: 150 mL / NET: 136.8 mL      Physical Exam:  Vital signs / Reviewed and Physical Exam Performed where pertinent and urgently required      LABS/STUDIES  --------------------------------------------------------------------------------              8.2    40.43 >-----------<  204      [02-13-21 @ 05:09]              25.8     137  |  96  |  101.0  ----------------------------<  177      [02-13-21 @ 05:09]  5.2   |  23.0  |  4.04        Ca     7.7     [02-13-21 @ 05:09]      Mg     2.8     [02-13-21 @ 05:09]      Phos  8.8     [02-13-21 @ 05:09]    TPro  5.9  /  Alb  2.1  /  TBili  0.5  /  DBili  x   /  AST  50  /  ALT  17  /  AlkPhos  130  [02-13-21 @ 05:09]      PTT: 72.0       [02-12-21 @ 04:28]      Iron 16, TIBC 320, %sat 5      [01-26-21 @ 07:57]  Ferritin 2568      [02-09-21 @ 18:43]  Lipid: chol --, , HDL --, LDL --      [02-11-21 @ 04:22]    HCV 0.08, Nonreact      [01-26-21 @ 13:28]

## 2021-02-14 NOTE — PROGRESS NOTE ADULT - SUBJECTIVE AND OBJECTIVE BOX
[facility] DIVISION OF KIDNEY DISEASES AND HYPERTENSION -- HEMODIALYSIS NOTE  --------------------------------------------------------------------------------  Chief Complaint: ATN/Ongoing hemodialysis requirement    24 hour events/subjective:  Pt seen/examined in [facility] unit this AM  On CVVHDF;  K+ high this AM; changing RX;    PAST HISTORY  --------------------------------------------------------------------------------  No significant changes to PMH, PSH, FHx, SHx, unless otherwise noted    ALLERGIES & MEDICATIONS  --------------------------------------------------------------------------------  Allergies    Allergy Status Unknown  No Known Allergies    Intolerances      Standing Inpatient Medications  chlorhexidine 0.12% Liquid 15 milliLiter(s) Oral Mucosa every 12 hours  chlorhexidine 2% Cloths 1 Application(s) Topical <User Schedule>  chlorhexidine 4% Liquid 1 Application(s) Topical <User Schedule>  cisatracurium Infusion 3 MICROgram(s)/kG/Min IV Continuous <Continuous>  CRRT Treatment    <Continuous>  dextrose 40% Gel 15 Gram(s) Oral once  dextrose 5%. 1000 milliLiter(s) IV Continuous <Continuous>  dextrose 5%. 1000 milliLiter(s) IV Continuous <Continuous>  dextrose 50% Injectable 25 Gram(s) IV Push once  dextrose 50% Injectable 25 Gram(s) IV Push once  famotidine Injectable 20 milliGRAM(s) IV Push daily  fentaNYL   Infusion. 0.5 MICROgram(s)/kG/Hr IV Continuous <Continuous>  glucagon  Injectable 1 milliGRAM(s) IntraMuscular once  heparin  Infusion.  Unit(s)/Hr IV Continuous <Continuous>  insulin glargine Injectable (LANTUS) 15 Unit(s) SubCutaneous every morning  insulin lispro (ADMELOG) corrective regimen sliding scale   SubCutaneous every 6 hours  meropenem  IVPB 500 milliGRAM(s) IV Intermittent every 24 hours  methylPREDNISolone sodium succinate Injectable 40 milliGRAM(s) IV Push every 6 hours  midodrine 10 milliGRAM(s) Oral every 8 hours  norepinephrine Infusion 0.05 MICROgram(s)/kG/Min IV Continuous <Continuous>  PrismaSATE Dialysate BK 0 / 3.5 5000 milliLiter(s) CRRT <Continuous>  PrismaSOL Filtration BGK 4 / 2.5 5000 milliLiter(s) CRRT <Continuous>  PrismaSOL Filtration BGK 4 / 2.5 5000 milliLiter(s) CRRT <Continuous>  propofol Infusion 10 MICROgram(s)/kG/Min IV Continuous <Continuous>    PRN Inpatient Medications  acetaminophen   Tablet .. 650 milliGRAM(s) Oral every 6 hours PRN  heparin   Injectable 6000 Unit(s) IV Push every 6 hours PRN  heparin   Injectable 3000 Unit(s) IV Push every 6 hours PRN      REVIEW OF SYSTEMS  --------------------------------------------------------------------------------  Unable to obtain    VITALS/PHYSICAL EXAM  --------------------------------------------------------------------------------  T(C): 36.4 (02-14-21 @ 11:00), Max: 36.6 (02-13-21 @ 14:00)  HR: 106 (02-14-21 @ 12:00) (81 - 110)  BP: --  RR: 28 (02-14-21 @ 12:00) (28 - 36)  SpO2: 94% (02-14-21 @ 12:00) (86% - 99%)  Wt(kg): --        02-13-21 @ 07:01  -  02-14-21 @ 07:00  --------------------------------------------------------  IN: 2289.2 mL / OUT: 901 mL / NET: 1388.2 mL    02-14-21 @ 07:01  -  02-14-21 @ 12:35  --------------------------------------------------------  IN: 510.7 mL / OUT: 206 mL / NET: 304.7 mL      Physical Exam:  	Gen: int sed  	HEENT: ETT  	Pulm: vent BS  	CV: RRR, S1S2;   	Back: No spinal or CVA tenderness; no sacral edema  	Abd: +BS, soft, nontender/nondistended  	: No suprapubic tenderness  	UE: Warm,  +edema;   	LE: Warm, +edema  	Neuro:sed  	Psych: sed  	Skin: Warm, without rashes       LABS/STUDIES  --------------------------------------------------------------------------------              7.6    46.51 >-----------<  202      [02-14-21 @ 04:57]              25.3     136  |  99  |  68.0  ----------------------------<  122      [02-14-21 @ 04:57]  5.7   |  22.0  |  2.42        Ca     7.6     [02-14-21 @ 04:57]      Mg     2.7     [02-14-21 @ 04:57]      Phos  7.8     [02-14-21 @ 04:57]    TPro  5.6  /  Alb  2.4  /  TBili  0.7  /  DBili  x   /  AST  61  /  ALT  19  /  AlkPhos  99  [02-14-21 @ 04:57]      PTT: 90.8       [02-14-21 @ 04:57]      Iron 16, TIBC 320, %sat 5      [01-26-21 @ 07:57]  Ferritin 2568      [02-09-21 @ 18:43]  Lipid: chol --, , HDL --, LDL --      [02-11-21 @ 04:22]    HBsAb 10.5      [02-13-21 @ 03:42]  HBsAg Nonreact      [02-13-21 @ 03:42]  HBcAb Nonreact      [02-13-21 @ 03:42]  HCV 0.08, Nonreact      [01-26-21 @ 13:28]

## 2021-02-14 NOTE — PROGRESS NOTE ADULT - SUBJECTIVE AND OBJECTIVE BOX
Patient is a 60y old  Female who presents with a chief complaint of SOB/COVID (2021 13:53)      BRIEF HOSPITAL COURSE: 59 y/o F with a h/o HTN, HLD, DM2, admitted on  with COVID-19 viral pneumonia and bilateral diffuse patchy lung infiltrates on CXR. Hospital course complicated by progressive hypoxemic respiratory failure and ARDS requiring endotracheal intubation, as well as distributive shock state, JER requiring HD, DVT, suspected PE (s/p tPA).    Events last 24 hours: Received patient on 100% FiO2 and +14 of PEEP. Deeply sedated on propofol and fentanyl infusions. On IV paralytic. Requiring IV vasopressor support. CVVHD ongoing.        PAST MEDICAL & SURGICAL HISTORY:  Hyperlipidemia    Liver disease  (unable to take tylenol )    Hypertension    Diabetes    Diverticulitis    High cholesterol    HTN (hypertension)    DM (diabetes mellitus)    H/O:         Review of Systems:  Unable to obtain secondary to sedation/intubation.        Medications:  meropenem  IVPB 500 milliGRAM(s) IV Intermittent every 24 hours  norepinephrine Infusion 0.05 MICROgram(s)/kG/Min IV Continuous <Continuous>  acetaminophen   Tablet .. 650 milliGRAM(s) Oral every 6 hours PRN  cisatracurium Infusion 3 MICROgram(s)/kG/Min IV Continuous <Continuous>  fentaNYL   Infusion. 0.5 MICROgram(s)/kG/Hr IV Continuous <Continuous>  propofol Infusion 10 MICROgram(s)/kG/Min IV Continuous <Continuous>  heparin   Injectable 6000 Unit(s) IV Push every 6 hours PRN  heparin   Injectable 3000 Unit(s) IV Push every 6 hours PRN  heparin  Infusion.  Unit(s)/Hr IV Continuous <Continuous>  famotidine Injectable 20 milliGRAM(s) IV Push daily  dextrose 40% Gel 15 Gram(s) Oral once  dextrose 50% Injectable 25 Gram(s) IV Push once  dextrose 50% Injectable 25 Gram(s) IV Push once  glucagon  Injectable 1 milliGRAM(s) IntraMuscular once  insulin glargine Injectable (LANTUS) 15 Unit(s) SubCutaneous every morning  insulin lispro (ADMELOG) corrective regimen sliding scale   SubCutaneous every 6 hours  methylPREDNISolone sodium succinate Injectable 40 milliGRAM(s) IV Push every 6 hours  dextrose 5%. 1000 milliLiter(s) IV Continuous <Continuous>  dextrose 5%. 1000 milliLiter(s) IV Continuous <Continuous>  chlorhexidine 0.12% Liquid 15 milliLiter(s) Oral Mucosa every 12 hours  chlorhexidine 2% Cloths 1 Application(s) Topical <User Schedule>  chlorhexidine 4% Liquid 1 Application(s) Topical <User Schedule>  CRRT Treatment    <Continuous>  Phoxillum Filtration BK 4 / 2.5 5000 milliLiter(s) CRRT <Continuous>  Phoxillum Filtration BK 4 / 2.5 5000 milliLiter(s) CRRT <Continuous>  Phoxillum Filtration BK 4 / 2.5 5000 milliLiter(s) CRRT <Continuous>      Mode: AC/ CMV (Assist Control/ Continuous Mandatory Ventilation)  RR (machine): 36  TV (machine): 350  FiO2: 100  PEEP: 14  MAP: 28  PC: 32  PIP: 50      ICU Vital Signs Last 24 Hrs  T(C): 36.6 (2021 04:00), Max: 36.6 (2021 07:54)  T(F): 97.8 (2021 04:00), Max: 97.8 (2021 07:54)  HR: 100 (2021 05:00) (90 - 114)  BP: 102/49 (2021 12:30) (102/49 - 127/54)  BP(mean): 62 (2021 12:30) (62 - 84)  ABP: 103/51 (2021 05:00) (89/47 - 127/77)  ABP(mean): 70 (2021 05:00) (62 - 91)  RR: 36 (2021 05:00) (34 - 36)  SpO2: 95% (2021 05:00) (82% - 98%)      ABG - ( 2021 04:37 )  pH, Arterial: 7.23  pH, Blood: x     /  pCO2: 62    /  pO2: 89    / HCO3: 23    / Base Excess: -1.9  /  SaO2: 97                  I&O's Detail    2021 07:01  -  2021 07:00  --------------------------------------------------------  IN:    Cisatracurium: 183.4 mL    FentaNYL: 204.4 mL    Heparin Infusion: 98 mL    Insulin: 51 mL    Propofol: 210 mL  Total IN: 746.8 mL    OUT:    Glucerna 1.5: 0 mL    Indwelling Catheter - Urethral (mL): 364 mL    Norepinephrine: 0 mL    Other (mL): 0 mL    Vasopressin: 0 mL  Total OUT: 364 mL    Total NET: 382.8 mL      2021 07:01  -  2021 06:10  --------------------------------------------------------  IN:    Cisatracurium: 301.3 mL    FentaNYL: 335.8 mL    Heparin Infusion: 161 mL    IV PiggyBack: 50 mL    Norepinephrine: 108.8 mL    Other (mL): 720 mL    Propofol: 419.2 mL  Total IN: 2096.1 mL    OUT:    Glucerna 1.5: 0 mL    Indwelling Catheter - Urethral (mL): 235 mL    Other (mL): 545 mL  Total OUT: 780 mL    Total NET: 1316.1 mL            LABS:                        7.6    46.51 )-----------( 202      ( 2021 04:57 )             25.3         136  |  99  |  68.0<H>  ----------------------------<  122<H>  5.7<H>   |  22.0  |  2.42<H>    Ca    7.6<L>      2021 04:57  Phos  7.8       Mg     2.7         TPro  5.6<L>  /  Alb  2.4<L>  /  TBili  0.7  /  DBili  x   /  AST  61<H>  /  ALT  19  /  AlkPhos  99            CAPILLARY BLOOD GLUCOSE      POCT Blood Glucose.: 119 mg/dL (2021 05:43)    PTT - ( 2021 04:57 )  PTT:90.8 sec    CULTURES:  Culture Results:   No growth at 5 days. (21 @ 10:42)  Culture Results:   No growth at 5 days. (21 @ 10:42)        Physical Examination:    General: sedated, intubated, supine    HEENT: Pupils equal, reactive to light.  Symmetric.    PULM: diminished bilaterally, no significant sputum production    CVS: tachycardic, reg rhythm, no murmurs, rubs, or gallops    ABD: Soft, nondistended, nontender, normoactive bowel sounds, no masses    EXT: No edema, nontender    SKIN: Warm and well perfused, no rashes noted.    NEURO: deeply sedated and paralyzed      RADIOLOGY:     < from: Xray Chest 1 View-PORTABLE IMMEDIATE (Xray Chest 1 View-PORTABLE IMMEDIATE .) (21 @ 17:17) >  FINDINGS: ET tube tip above tracheal bifurcation.  NG tube tip beyond GE junction.  RIGHT IJ catheter tip in SVC.  LEFT IJ catheter tip in SVC.  The lungs show bilateral  multifocal and diffuse ill-defined airspace consolidations.. No pneumothorax.    The heart and mediastinum are within normal limits.    Visualized osseous structures are intact.    IMPRESSION:   RIGHT IJ catheter tip in SVC otherwise no change..            A/P:    59 y/o F with a h/o HTN, HLD, DM2, with acute hypoxemic respiratory failure, ARDS, COVID-19 viral pneumonia, distributive shock, JER, DVT, suspected PE (s/p tPA).      - actively titrating ventilator settings to maintain SpO2 > 90%, pO2 64 on 100% FiO2 and +14 of PEEP, continues to meet severe ARDS criteria although likely post-proliferative at this point given persistently elevated airway pressures and poor lung compliance, hypercapnia/resp acidosis improved a bit with adjustment in inspiratory time    - will allow for a degree of permissive hypercapnia for the sake of oxygenation and lung protection, maintain pH > 7.20    - maintain deep sedation with propofol and fentanyl infusions to promote ventilator synchrony and patient comfort, maintain IV paralytic, unclear role for proning this far into disease course    - completed course of IV dexamethasone     - shock state is distributive secondary to heavy sedation + sepsis, actively titrating norepinephrine infusion to maintain a MAP > 65, add midodrine    - possible superimposed bacterial infection, worsening leukocytosis (WBC up to 46K), blood cultures neg for growth, continue empiric meropenem, send sputum culture    - JER secondary to ischemic ATN, possible relation to viral disease process, continue CVVHD as per nephrology- may need to adjust given mild hyperkalemia this morning, trend BUN/Cr, monitor lytes and UOP    - maintain heparin infusion for DVT and suspected PE        CRITICAL CARE TIME SPENT: 38 mins  Time spent evaluating/treating patient with medical issues that pose a high risk for life threatening deterioration and/or end-organ damage, reviewing data/labs/imaging, discussing case with multidisciplinary team, discussing plan/goals of care with patient/family. Non-inclusive of procedure time.

## 2021-02-14 NOTE — PROGRESS NOTE ADULT - SUBJECTIVE AND OBJECTIVE BOX
Patient is a 60y old  Female who presents with a chief complaint of SOB/COVID (2021 12:35)      BRIEF HOSPITAL COURSE: 59 y/o F with a h/o HTN, HLD, DM2, admitted on  with COVID-19 viral pneumonia and bilateral diffuse patchy lung infiltrates on CXR. Hospital course complicated by progressive hypoxemic respiratory failure and ARDS requiring endotracheal intubation, as well as distributive shock state, JER requiring HD, DVT, suspected PE (s/p tPA).    Events last 24 hours:  Remains on 100% FiO2 / +14. Sedated and chemically paralyzed. Airway pressures ~40 today, slightly worse. Pressor dependent shock, increased PO midodrine. Remains on CRRT. Hyperkalemic this morning.      PAST MEDICAL & SURGICAL HISTORY:  Hyperlipidemia    Liver disease  (unable to take tylenol )    Hypertension    Diabetes    Diverticulitis    High cholesterol    HTN (hypertension)    DM (diabetes mellitus)    H/O:           Hosp day #20d    Vent day #  Mode: AC/ CMV (Assist Control/ Continuous Mandatory Ventilation)  RR (machine): 28  FiO2: 100  PEEP: 14  ITime: 0.9  MAP: 27  PC: 32  PIP: 46        Vital signs / Reviewed and Physical Exam Performed where pertinent and urgently required    Lab / Radiology  studies / ABG / Meds -  reviewed and interpreted into the assessment and treatment plan.      Assessment/Plan/Therapeutic interventions      Assessment:  1. Acute hypoxic respiratory failure  2. ARDS  3. COVID-19 viral pneumonia  4. Shock  5. JER  6. DVT/ PE.     Neuro - Sedated on propofol and fentanyl Chemically paralyzed on nimbex, unable to wean paralytics at this time.     CV -  Titrating levophed to maintain MAP >65. Increased PO midodrine.     Pulm -  ARDS-NET 4-6cc/kg IBW TV as able to maintain plateau pressures <30. Titrating FiO2 to maintain O2 sat >88%. Allow permissive hypercapnia. repeat ABG in am. Unable to titrate FiO2 down further today on 100% and PEEP10. Switched to Pressure control w/ improvement of airway pressures. Will repeat ABG  few hours.                Prone ventilation consideration as feasible  Pa02/Fi02 < 150 on Fi02 >60% and PEEP at least 5                 Vent bundle Reviewed . Solumderol 40mg q8hr. Finished course of Decadron/ Remdesivir.     GI -  Pepcid BID. Tolerating tube feeds         Renal - JER, Oliguric. CRRT ongoing, Hyperkalemic today, fluids changed to 0K bath. BMP q6hr. Cho Catheter improving. Nephrology following.     Heme -  Full AC w/ heparin gtt for DVT. s/p TPA for suspected PE.  - Anemia, s/p 2 PRBC. H/H stable. No signs of bleeding.,     ID - On meropenum for suspected superimposed bacterial PNA. Cultures are negative to date. Rising leukocytosis. ID consult     Endo - Transitioned to lantus. Insulin sliding scale.     Daughter was called and updated. All questions answered. They are aware of her poor prognosis but hopeful she will recover.         COVID 19 specific considerations and therapeutic  options based on the available and rapidly changing literature    Goals of care considerations:  Ongoing assessment for patient specific treatment options based on progression or decline.  I have involved the family with updates and requests in guidance for medical decision making.           60MINUTES OF ADDITIONAL CRITICAL CARE TIME  spent in the management of this critically ill COVID-19 patient/PUI patient with continuous assessments and interventions based on the interpretation of multiple databases.

## 2021-02-14 NOTE — CHART NOTE - NSCHARTNOTEFT_GEN_A_CORE
Source: Patient [ ]  Family [ ]   other [ x] EMR     Enteral /Parenteral Nutrition: Diet, NPO with Tube Feed:   Tube Feeding Modality: Orogastric  Glucerna 1.5 Sukhjinder (GLUCERNA1.5)  Total Volume for 24 Hours (mL): 1200  Continuous  Starting Tube Feed Rate {mL per Hour}: 10  Increase Tube Feed Rate by (mL): 10     Every 2 hours  Until Goal Tube Feed Rate (mL per Hour): 50  Tube Feed Duration (in Hours): 24  Tube Feed Start Time: 10:00 (02-10-21 @ 09:47)      Current Weight: (1/25) 160 lbs  No new weight  Noted with 1+ generalized edema, continue to trend    Pertinent Medications: MEDICATIONS  (STANDING):  chlorhexidine 0.12% Liquid 15 milliLiter(s) Oral Mucosa every 12 hours  chlorhexidine 2% Cloths 1 Application(s) Topical <User Schedule>  chlorhexidine 4% Liquid 1 Application(s) Topical <User Schedule>  cisatracurium Infusion 3 MICROgram(s)/kG/Min (13.1 mL/Hr) IV Continuous <Continuous>  CRRT Treatment    <Continuous>  dextrose 40% Gel 15 Gram(s) Oral once  dextrose 5%. 1000 milliLiter(s) (50 mL/Hr) IV Continuous <Continuous>  dextrose 5%. 1000 milliLiter(s) (100 mL/Hr) IV Continuous <Continuous>  dextrose 50% Injectable 25 Gram(s) IV Push once  dextrose 50% Injectable 25 Gram(s) IV Push once  famotidine Injectable 20 milliGRAM(s) IV Push daily  fentaNYL   Infusion. 0.5 MICROgram(s)/kG/Hr (3.63 mL/Hr) IV Continuous <Continuous>  glucagon  Injectable 1 milliGRAM(s) IntraMuscular once  heparin  Infusion.  Unit(s)/Hr (13 mL/Hr) IV Continuous <Continuous>  insulin glargine Injectable (LANTUS) 15 Unit(s) SubCutaneous every morning  insulin lispro (ADMELOG) corrective regimen sliding scale   SubCutaneous every 6 hours  meropenem  IVPB 500 milliGRAM(s) IV Intermittent every 24 hours  methylPREDNISolone sodium succinate Injectable 40 milliGRAM(s) IV Push every 6 hours  midodrine 15 milliGRAM(s) Oral every 8 hours  norepinephrine Infusion 0.05 MICROgram(s)/kG/Min (6.81 mL/Hr) IV Continuous <Continuous>  PrismaSATE Dialysate BK 0 / 3.5 5000 milliLiter(s) (1500 mL/Hr) CRRT <Continuous>  PrismaSOL Filtration BGK 4 / 2.5 5000 milliLiter(s) (1000 mL/Hr) CRRT <Continuous>  PrismaSOL Filtration BGK 4 / 2.5 5000 milliLiter(s) (500 mL/Hr) CRRT <Continuous>  propofol Infusion 10 MICROgram(s)/kG/Min (4.36 mL/Hr) IV Continuous <Continuous>    MEDICATIONS  (PRN):  acetaminophen   Tablet .. 650 milliGRAM(s) Oral every 6 hours PRN Temp greater or equal to 38C (100.4F), Mild Pain (1 - 3)  heparin   Injectable 6000 Unit(s) IV Push every 6 hours PRN For aPTT less than 40  heparin   Injectable 3000 Unit(s) IV Push every 6 hours PRN For aPTT between 40 - 57    Pertinent Labs: CBC Full  -  ( 14 Feb 2021 04:57 )  WBC Count : 46.51 K/uL  RBC Count : 2.64 M/uL  Hemoglobin : 7.6 g/dL  Hematocrit : 25.3 %  Platelet Count - Automated : 202 K/uL  Mean Cell Volume : 95.8 fl  Mean Cell Hemoglobin : 28.8 pg  Mean Cell Hemoglobin Concentration : 30.0 gm/dL  Auto Neutrophil # : x  Auto Lymphocyte # : x  Auto Monocyte # : x  Auto Eosinophil # : x  Auto Basophil # : x  Auto Neutrophil % : x  Auto Lymphocyte % : x  Auto Monocyte % : x  Auto Eosinophil % : x  Auto Basophil % : x        Skin: Intact per documentation    Estimated Needs:   [x ] no change since previous assessment  [ ] recalculated:     Current Nutrition Diagnosis:  Pt remains at high nutrition risk secondary to malnutrition (moderate, acute) related to inability to meet sufficient protein-energy requirements in setting of hypoxemic respiratory failure secondary to COVID-19 viral pneumonia now requiring intubation, ARDS, shock, and acute LLE DVT, JER requiring CRRT as evidenced by meeting <75% nutrient needs >/7 days and 1+ generalized edema. Pt remains intubated/sedated. Tube feeds of Glucerna 1.5 running at goal rate of 50 ml/hr (x20 hrs) to provide 1000 ml, 1500 kcal, 83g protein, 759 ml free water, and 100% of RDIs for vitamins/minerals.    Recommendations:   1) Continue tube feeds as ordered at this time; additional free water per MD discretion.  2) Rx: MVI, vitamin C, and vitamin D supplementation as feasible.  3) Monitor tube feed tolerance.  4) Obtain daily weights to monitor trends.         Monitoring and Evaluation:   [ ] PO intake [x ] Tolerance to diet prescription [X] Weights  [X] Follow up per protocol [X] Labs:

## 2021-02-14 NOTE — PROGRESS NOTE ADULT - ASSESSMENT
1) ATN  2) Respiratory failure  3) COVID 19 RUTH  4) Anemia  5) metabolic acidosis    CVVHDF;  Changing RX; Phoxillum 4K pre/post; dialysate 0K+    Orders in chart  RN at bedside aware; BMP 4pm    dw Dr Nevarez

## 2021-02-14 NOTE — CONSULT NOTE ADULT - SUBJECTIVE AND OBJECTIVE BOX
INFECTIOUS DISEASES AND INTERNAL MEDICINE at [facility]  =======================================================  Miguel Aponte MD  Diplomates American Board of Internal Medicine and Infectious Diseases  Telephone [phone]  Fax            [phone]  =======================================================    MAYA [id number]      HPI:  61 y/o F w/ a PMH of DMII, HTN came in c/o sob worsening over   week and associated naussea and NBNB vomiting x 2 days.  Pt was recently diagnosed as an outpt w/ COVID on  and was told by PCP if her O2 sat went <92 she should go to the hospital.  Pt came to the ED  and O2 sat was noted to be >90% and was discharged home.  Pts SOB did not improve and she came back in today.  Pt also reports subjective fevers/chills at home but denies cp, palpitations, abd pain, lower extremity swelling/pain.    PT ADMITTED  WAS TREATED FOR COVID  WITH  DECADRON AND REMDESIVIR AND HAD COMPLETED A COURSE OF ABX NOW ITH HYPOTHERMIA INCREASED WBC  ASKED TO EVALUATE FROM ID STANDPOINT       PAST MEDICAL & SURGICAL HISTORY:  Hyperlipidemia    Liver disease  (unable to take tylenol )    Hypertension    Diabetes    Diverticulitis    High cholesterol    HTN (hypertension)    DM (diabetes mellitus)    H/O:         ANTIBIOTICS  meropenem  IVPB 500 milliGRAM(s) IV Intermittent every 24 hours      Allergies    Allergy Status Unknown  No Known Allergies    Intolerances        SOCIAL HISTORY:     FAMILY HX   FAMILY HISTORY:  No pertinent family history in first degree relatives    No pertinent family history in first degree relatives        Vital Signs Last 24 Hrs  T(C): 36.4 (2021 11:00), Max: 36.6 (2021 04:00)  T(F): 97.5 (2021 11:00), Max: 97.8 (2021 04:00)  HR: 96 (2021 14:00) (81 - 110)  BP: --  BP(mean): --  RR: 28 (2021 14:00) (28 - 36)  SpO2: 96% (2021 14:00) (90% - 99%)  Drug Dosing Weight  Height (cm): 167.6 (2021 15:31)  Weight (kg): 72.6 (2021 15:31)  BMI (kg/m2): 25.8 (2021 15:31)  BSA (m2): 1.82 (2021 15:31)      REVIEW OF SYSTEMS:   ON VENT UNABLE TO OBTAIN                 PHYSICAL EXAMINATION:    GENERAL: The patient is a well-developed, well-nourished ____SEDATED ON VENT    VITAL SIGNS: T(C): 36.4 (21 @ 11:00), Max: 36.6 (21 @ 04:00)  HR: 96 (21 @ 14:00) (81 - 110)  BP: --  RR: 28 (21 @ 14:00) (28 - 36)  SpO2: 96% (21 @ 14:00) (90% - 99%)  Wt(kg): --    HEENT: Head is normocephalic and atraumatic.  ANICTERIC  NECK: Supple. No carotid bruits.  No lymphadenopathy or thyromegaly.    LUNGS:COARSE BREATH SOUNDS    HEART: Regular rate and rhythm without murmur.    ABDOMEN: Soft, nontender, and nondistended.  Positive bowel sounds.  No hepatosplenomegaly was noted. NO REBOUND NO GUARDING    EXTREMITIES: NO EDEMA NO ERYTHEMA    NEUROLOGIC: SEDATED ON VENT      SKIN: No ulceration or induration present. NO RASH        BLOOD CULTURES  Culture Results:   No growth at 5 days. ( @ 10:42)  Culture Results:   No growth at 5 days. ( @ 10:42)       URINE CX          LABS:                        7.6    46.51 )-----------( 202      ( 2021 04:57 )             25.3         136  |  99  |  68.0<H>  ----------------------------<  122<H>  5.7<H>   |  22.0  |  2.42<H>    Ca    7.6<L>      2021 04:57  Phos  7.8     -  Mg     2.7     -    TPro  5.6<L>  /  Alb  2.4<L>  /  TBili  0.7  /  DBili  x   /  AST  61<H>  /  ALT  19  /  AlkPhos  99  -14    PTT - ( 2021 04:57 )  PTT:90.8 sec      RADIOLOGY & ADDITIONAL STUDIES:      ASSESSMENT/PLAN  61 y/o F w/ a PMH of DMII, HTN came in c/o sob worsening over   week and associated naussea and NBNB vomiting x 2 days.  Pt was recently diagnosed as an outpt w/ COVID on  and was told by PCP if her O2 sat went <92 she should go to the hospital.  Pt came to the ED  and O2 sat was noted to be >90% and was discharged home.  Pts SOB did not improve and she came back in today.  Pt also reports subjective fevers/chills at home but denies cp, palpitations, abd pain, lower extremity swelling/pain.    PT ADMITTED  WAS TREATED FOR COVID  WITH  DECADRON AND REMDESIVIR AND HAD COMPLETED A COURSE OF ABX  LAST BLOOD CX WERE  WILL REPEAT 2 SETS  IF DIARRHEA SEND STOOL FOR C DIFF  PT ON MERREM CAN CONTINUE FOR NOW  IF PT STABLE TO TRANSFER TO RADIOLOGY WOULD OBTAIN CT CHEST ABD PELVIS TO LOOK FOR SOURCE  PT ON PRESSOR  OVERALL PROGNOSIS IS GUARDED  WILL FOLLOW UP WITH FURTHER RECOMMENDATIONS              MANUELA MCGOVERN MD

## 2021-02-15 NOTE — PROGRESS NOTE ADULT - SUBJECTIVE AND OBJECTIVE BOX
[facility] DIVISION OF KIDNEY DISEASES AND HYPERTENSION -- FOLLOW UP NOTE  --------------------------------------------------------------------------------  Chief Complaint:  ATN  24 hour events/subjective:  On CVVHDF  in covid isolation      PAST HISTORY  --------------------------------------------------------------------------------  No significant changes to PMH, PSH, FHx, SHx, unless otherwise noted    ALLERGIES & MEDICATIONS  --------------------------------------------------------------------------------  Allergies    Allergy Status Unknown  No Known Allergies    Intolerances      Standing Inpatient Medications  chlorhexidine 0.12% Liquid 15 milliLiter(s) Oral Mucosa every 12 hours  chlorhexidine 2% Cloths 1 Application(s) Topical <User Schedule>  chlorhexidine 4% Liquid 1 Application(s) Topical <User Schedule>  cisatracurium Infusion 3 MICROgram(s)/kG/Min IV Continuous <Continuous>  CRRT Treatment    <Continuous>  dextrose 40% Gel 15 Gram(s) Oral once  dextrose 5%. 1000 milliLiter(s) IV Continuous <Continuous>  dextrose 5%. 1000 milliLiter(s) IV Continuous <Continuous>  dextrose 50% Injectable 25 Gram(s) IV Push once  dextrose 50% Injectable 25 Gram(s) IV Push once  fentaNYL   Infusion. 0.5 MICROgram(s)/kG/Hr IV Continuous <Continuous>  glucagon  Injectable 1 milliGRAM(s) IntraMuscular once  heparin  Infusion.  Unit(s)/Hr IV Continuous <Continuous>  insulin glargine Injectable (LANTUS) 10 Unit(s) SubCutaneous every morning  insulin lispro (ADMELOG) corrective regimen sliding scale   SubCutaneous every 6 hours  meropenem  IVPB 500 milliGRAM(s) IV Intermittent every 24 hours  midodrine 15 milliGRAM(s) Oral every 8 hours  norepinephrine Infusion 0.05 MICROgram(s)/kG/Min IV Continuous <Continuous>  pantoprazole  Injectable 40 milliGRAM(s) IV Push daily  Phoxillum Filtration BK 4 / 2.5 5000 milliLiter(s) CRRT <Continuous>  Phoxillum Filtration BK 4 / 2.5 5000 milliLiter(s) CRRT <Continuous>  Phoxillum Filtration BK 4 / 2.5 5000 milliLiter(s) CRRT <Continuous>  propofol Infusion 10 MICROgram(s)/kG/Min IV Continuous <Continuous>    PRN Inpatient Medications  acetaminophen   Tablet .. 650 milliGRAM(s) Oral every 6 hours PRN  heparin   Injectable 6000 Unit(s) IV Push every 6 hours PRN  heparin   Injectable 3000 Unit(s) IV Push every 6 hours PRN      REVIEW OF SYSTEMS  --------------------------------------------------------------------------------  Unable to obtain    VITALS/PHYSICAL EXAM  --------------------------------------------------------------------------------  T(C): 35.7 (02-15-21 @ 12:00), Max: 37.1 (02-14-21 @ 15:39)  HR: 88 (02-15-21 @ 14:00) (81 - 104)  BP: --  RR: 30 (02-15-21 @ 14:00) (28 - 32)  SpO2: 90% (02-15-21 @ 14:00) (90% - 100%)  Wt(kg): --    Weight (kg): 72 (02-15-21 @ 08:30)      02-14-21 @ 07:01  -  02-15-21 @ 07:00  --------------------------------------------------------  IN: 2466.5 mL / OUT: 1236 mL / NET: 1230.5 mL    02-15-21 @ 07:01  -  02-15-21 @ 14:58  --------------------------------------------------------  IN: 1189 mL / OUT: 300 mL / NET: 889 mL      Physical Exam:  Limited 2/2 CoVID    LABS/STUDIES  --------------------------------------------------------------------------------              6.4    39.35 >-----------<  143      [02-15-21 @ 04:01]              20.1     134  |  99  |  37.0  ----------------------------<  110      [02-15-21 @ 10:18]  4.8   |  22.0  |  1.15        Ca     8.0     [02-15-21 @ 10:18]      Mg     2.5     [02-15-21 @ 04:01]      Phos  6.5     [02-15-21 @ 04:01]    TPro  4.9  /  Alb  2.1  /  TBili  0.7  /  DBili  x   /  AST  60  /  ALT  19  /  AlkPhos  78  [02-15-21 @ 04:01]      PTT: 86.1       [02-15-21 @ 04:01]      Creatinine Trend:  SCr 1.15 [02-15 @ 10:18]  SCr 1.26 [02-15 @ 04:01]  SCr 1.63 [02-14 @ 17:08]  SCr 2.42 [02-14 @ 04:57]  SCr 2.84 [02-13 @ 22:02]    Urinalysis - [01-28-21 @ 20:13]      Color Yellow / Appearance Slightly Turbid / SG 1.015 / pH 6.0      Gluc 250 / Ketone Negative  / Bili Negative / Urobili Negative       Blood Small / Protein 30 / Leuk Est Negative / Nitrite Negative      RBC 0-2 / WBC 3-5 / Hyaline  / Gran  / Sq Epi  / Non Sq Epi Occasional / Bacteria Many      Iron 16, TIBC 320, %sat 5      [01-26-21 @ 07:57]  Ferritin 2568      [02-09-21 @ 18:43]  Lipid: chol --, , HDL --, LDL --      [02-11-21 @ 04:22]    HBsAb 10.5      [02-13-21 @ 03:42]  HBsAg Nonreact      [02-13-21 @ 03:42]  HBcAb Nonreact      [02-13-21 @ 03:42]  HCV 0.08, Nonreact      [01-26-21 @ 13:28]

## 2021-02-15 NOTE — PROGRESS NOTE ADULT - SUBJECTIVE AND OBJECTIVE BOX
Patient is a 60y old  Female who presents with a chief complaint of SOB/COVID (2021 14:59)      BRIEF HOSPITAL COURSE: 61 y/o F with a h/o HTN, HLD, DM2, admitted on  with COVID-19 viral pneumonia and bilateral diffuse patchy lung infiltrates on CXR. Hospital course complicated by progressive hypoxemic respiratory failure and ARDS requiring endotracheal intubation, as well as distributive shock state, JER requiring HD, DVT, suspected PE (s/p tPA).    Events last 24 hours: Received patient on 100% FiO2 and +14 of PEEP and made improvement in weaning through the night although with decompensation this morning requiring escalating vent settings. Deeply sedated on propofol and fentanyl infusions and remains on IV paralytic. Requiring IV vasopressor support. CVVHD ongoing.        PAST MEDICAL & SURGICAL HISTORY:  Hyperlipidemia    Liver disease  (unable to take tylenol )    Hypertension    Diabetes    Diverticulitis    High cholesterol    HTN (hypertension)    DM (diabetes mellitus)    H/O:         Review of Systems:  Unable to obtain secondary to sedation/intubation.        Medications:  meropenem  IVPB 500 milliGRAM(s) IV Intermittent every 24 hours  midodrine 15 milliGRAM(s) Oral every 8 hours  norepinephrine Infusion 0.05 MICROgram(s)/kG/Min IV Continuous <Continuous>  acetaminophen   Tablet .. 650 milliGRAM(s) Oral every 6 hours PRN  cisatracurium Infusion 3 MICROgram(s)/kG/Min IV Continuous <Continuous>  fentaNYL   Infusion. 0.5 MICROgram(s)/kG/Hr IV Continuous <Continuous>  propofol Infusion 10 MICROgram(s)/kG/Min IV Continuous <Continuous>  heparin   Injectable 3000 Unit(s) IV Push every 6 hours PRN  heparin   Injectable 6000 Unit(s) IV Push every 6 hours PRN  heparin  Infusion.  Unit(s)/Hr IV Continuous <Continuous>  famotidine Injectable 20 milliGRAM(s) IV Push daily  dextrose 40% Gel 15 Gram(s) Oral once  dextrose 50% Injectable 25 Gram(s) IV Push once  dextrose 50% Injectable 25 Gram(s) IV Push once  glucagon  Injectable 1 milliGRAM(s) IntraMuscular once  insulin glargine Injectable (LANTUS) 15 Unit(s) SubCutaneous every morning  insulin lispro (ADMELOG) corrective regimen sliding scale   SubCutaneous every 6 hours  methylPREDNISolone sodium succinate Injectable 40 milliGRAM(s) IV Push every 6 hours  dextrose 5%. 1000 milliLiter(s) IV Continuous <Continuous>  dextrose 5%. 1000 milliLiter(s) IV Continuous <Continuous>  chlorhexidine 0.12% Liquid 15 milliLiter(s) Oral Mucosa every 12 hours  chlorhexidine 2% Cloths 1 Application(s) Topical <User Schedule>  chlorhexidine 4% Liquid 1 Application(s) Topical <User Schedule>  CRRT Treatment    <Continuous>  PrismaSATE Dialysate BK 0 / 3.5 5000 milliLiter(s) CRRT <Continuous>  PrismaSOL Filtration BGK 4 / 2.5 5000 milliLiter(s) CRRT <Continuous>  PrismaSOL Filtration BGK 4 / 2.5 5000 milliLiter(s) CRRT <Continuous>      Mode: AC/ CMV (Assist Control/ Continuous Mandatory Ventilation)  RR (machine): 28  FiO2: 100  PEEP: 12  ITime: 0.9  MAP: 24  PC: 30  PIP: 42      ICU Vital Signs Last 24 Hrs  T(C): 36.8 (2021 19:00), Max: 37.1 (2021 15:39)  T(F): 98.3 (2021 19:00), Max: 98.7 (2021 15:39)  HR: 88 (15 Feb 2021 04:00) (81 - 107)  BP: --  BP(mean): --  ABP: 121/56 (15 Feb 2021 01:00) (92/49 - 149/72)  ABP(mean): 83 (15 Feb 2021 01:00) (63 - 100)  RR: 28 (15 Feb 2021 01:00) (28 - 36)  SpO2: 93% (15 Feb 2021 04:00) (91% - 100%)      ABG - ( 2021 04:37 )  pH, Arterial: 7.23  pH, Blood: x     /  pCO2: 62    /  pO2: 89    / HCO3: 23    / Base Excess: -1.9  /  SaO2: 97                  I&O's Detail    2021 07:01  -  2021 07:00  --------------------------------------------------------  IN:    Cisatracurium: 314.4 mL    FentaNYL: 14.6 mL    FentaNYL: 335.8 mL    Heparin Infusion: 168 mL    IV PiggyBack: 50 mL    Norepinephrine: 128.1 mL    Other (mL): 846 mL    Propofol: 432.3 mL  Total IN: 2289.2 mL    OUT:    Glucerna 1.5: 0 mL    Indwelling Catheter - Urethral (mL): 235 mL    Other (mL): 666 mL  Total OUT: 901 mL    Total NET: 1388.2 mL      2021 07:01  -  15 Feb 2021 04:10  --------------------------------------------------------  IN:    Cisatracurium: 235.8 mL    FentaNYL: 262.8 mL    Heparin Infusion: 126 mL    Norepinephrine: 54.1 mL    Other (mL): 606 mL    Propofol: 235.8 mL  Total IN: 1520.5 mL    OUT:    Indwelling Catheter - Urethral (mL): 10 mL    Other (mL): 590 mL  Total OUT: 600 mL    Total NET: 920.5 mL            LABS:                        7.6    46.51 )-----------( 202      ( 2021 04:57 )             25.3     02-    137  |  101  |  49.0<H>  ----------------------------<  101<H>  5.2   |  22.0  |  1.63<H>    Ca    7.8<L>      2021 17:08  Phos  5.9       Mg     2.7         TPro  x   /  Alb  2.3<L>  /  TBili  x   /  DBili  x   /  AST  x   /  ALT  x   /  AlkPhos  x             CAPILLARY BLOOD GLUCOSE      POCT Blood Glucose.: 123 mg/dL (15 Feb 2021 00:53)    PTT - ( 2021 04:57 )  PTT:90.8 sec    CULTURES:  Culture Results:   No growth at 5 days. (21 @ 10:42)  Culture Results:   No growth at 5 days. (21 @ 10:42)        Physical Examination:    General: sedated, intubated, supine    HEENT: Pupils equal, reactive to light.  Symmetric.    PULM: diminished bilaterally, no significant sputum production    CVS: intermittently tachycardic, reg rhythm, no murmurs, rubs, or gallops    ABD: Soft, nondistended, nontender, normoactive bowel sounds, no masses    EXT: No edema, nontender    SKIN: Warm and well perfused, no rashes noted.    NEURO: deeply sedated and paralyzed      RADIOLOGY:     < from: Xray Chest 1 View-PORTABLE IMMEDIATE (Xray Chest 1 View-PORTABLE IMMEDIATE .) (21 @ 17:17) >  FINDINGS: ET tube tip above tracheal bifurcation.  NG tube tip beyond GE junction.  RIGHT IJ catheter tip in SVC.  LEFT IJ catheter tip in SVC.  The lungs show bilateral  multifocal and diffuse ill-defined airspace consolidations.. No pneumothorax.    The heart and mediastinum are within normal limits.    Visualized osseous structures are intact.    IMPRESSION:   RIGHT IJ catheter tip in SVC otherwise no change..            A/P:    61 y/o F with a h/o HTN, HLD, DM2, with acute hypoxemic respiratory failure, ARDS, COVID-19 viral pneumonia, distributive shock, JER, DVT, suspected PE (s/p tPA).      - actively titrating ventilator settings to maintain SpO2 > 90%, weaned to 80% and 12 of PEEP through the night but this morning with decompensation requiring escalation to 100% FiO2, continues to meet severe ARDS criteria although likely post-proliferative at this point given persistently elevated airway pressures and poor lung compliance, PC reduced to 30, f/u ABG    - will allow for a degree of permissive hypercapnia for the sake of oxygenation and lung protection, maintain pH > 7.20    - maintain deep sedation with propofol and fentanyl infusions to promote ventilator synchrony and patient comfort, would consider a trial off IV paralytic as she has had a prolonged course of this and it ultimately may not even be all that effective    - completed course of IV dexamethasone     - shock state is distributive secondary to heavy sedation + sepsis, actively titrating norepinephrine infusion to maintain a MAP > 65, on high dose midodrine    - possible superimposed bacterial infection, worsening leukocytosis (WBC up to 46K), blood cultures neg for growth, continue empiric meropenem, repeat blood cultures pending, send sputum culture as able    - JER secondary to ischemic ATN, possible relation to viral disease process, continue CVVHD as per nephrology- will discuss transition to intermittent HD with daytime team, trend BUN/Cr, monitor lytes and UOP    - maintain heparin infusion for DVT and suspected PE            CRITICAL CARE TIME SPENT: 47 mins  Time spent evaluating/treating patient with medical issues that pose a high risk for life threatening deterioration and/or end-organ damage, reviewing data/labs/imaging, discussing case with multidisciplinary team, discussing plan/goals of care with patient/family. Non-inclusive of procedure time.

## 2021-02-15 NOTE — PROGRESS NOTE ADULT - SUBJECTIVE AND OBJECTIVE BOX
On Admission  21 (21d)  HPI:  61 y/o F w/ a PMH of DMII, HTN came in c/o sob worsening over the past week and associated naussea and NBNB vomiting x 2 days.  Pt was recently diagnosed as an outpt w/ COVID on  and was told by PCP if her O2 sat went <92 she should go to the hospital.  Pt came to the ED 2 days ago and O2 sat was noted to be >90% and was discharged home.  Pts SOB did not improve and she came back in today.  Pt also reports subjective fevers/chills at home but denies cp, palpitations, abd pain, lower extremity swelling/pain. (2021 18:19)    PAST MEDICAL & SURGICAL HISTORY:  Hyperlipidemia    Liver disease  (unable to take tylenol )    Hypertension    Diabetes    Diverticulitis    High cholesterol    HTN (hypertension)    DM (diabetes mellitus)    H/O:         Antimicrobial:  meropenem  IVPB 500 milliGRAM(s) IV Intermittent every 24 hours    Cardiovascular:  midodrine 15 milliGRAM(s) Oral every 8 hours  norepinephrine Infusion 0.05 MICROgram(s)/kG/Min IV Continuous <Continuous>    Pulmonary:    Hematalogic:  heparin   Injectable 6000 Unit(s) IV Push every 6 hours PRN  heparin   Injectable 3000 Unit(s) IV Push every 6 hours PRN  heparin  Infusion.  Unit(s)/Hr IV Continuous <Continuous>    Other:  acetaminophen   Tablet .. 650 milliGRAM(s) Oral every 6 hours PRN  chlorhexidine 0.12% Liquid 15 milliLiter(s) Oral Mucosa every 12 hours  chlorhexidine 2% Cloths 1 Application(s) Topical <User Schedule>  chlorhexidine 4% Liquid 1 Application(s) Topical <User Schedule>  cisatracurium Infusion 3 MICROgram(s)/kG/Min IV Continuous <Continuous>  CRRT Treatment    <Continuous>  dextrose 40% Gel 15 Gram(s) Oral once  dextrose 5%. 1000 milliLiter(s) IV Continuous <Continuous>  dextrose 5%. 1000 milliLiter(s) IV Continuous <Continuous>  dextrose 50% Injectable 25 Gram(s) IV Push once  dextrose 50% Injectable 25 Gram(s) IV Push once  famotidine Injectable 20 milliGRAM(s) IV Push daily  fentaNYL   Infusion. 0.5 MICROgram(s)/kG/Hr IV Continuous <Continuous>  glucagon  Injectable 1 milliGRAM(s) IntraMuscular once  insulin glargine Injectable (LANTUS) 15 Unit(s) SubCutaneous every morning  insulin lispro (ADMELOG) corrective regimen sliding scale   SubCutaneous every 6 hours  Phoxillum Filtration BK 4 / 2.5 5000 milliLiter(s) CRRT <Continuous>  Phoxillum Filtration BK 4 / 2.5 5000 milliLiter(s) CRRT <Continuous>  Phoxillum Filtration BK 4 / 2.5 5000 milliLiter(s) CRRT <Continuous>  propofol Infusion 10 MICROgram(s)/kG/Min IV Continuous <Continuous>      Drug Dosing Weight  Height (cm): 167.6 (2021 15:31)  Weight (kg): 72 (15 Feb 2021 08:30)  BMI (kg/m2): 25.6 (15 Feb 2021 08:30)  BSA (m2): 1.81 (15 Feb 2021 08:30)    T(C): 36.1 (02-15-21 @ 08:30), Max: 37.1 (21 @ 15:39)  HR: 86 (02-15-21 @ 10:00)  BP: --  BP(mean): --  ABP: 117/62 (02-15-21 @ 10:00)  ABP(mean): 72 (02-15-21 @ 10:00)  RR: 32 (02-15-21 @ 10:00)  SpO2: 91% (02-15-21 @ 10:00)    ABG - ( 15 Feb 2021 04:30 )  pH, Arterial: 7.23  pH, Blood: x     /  pCO2: 64    /  pO2: 84    / HCO3: 24    / Base Excess: -0.7  /  SaO2: 96                     @ 07:01  -  02-15 @ 07:00  --------------------------------------------------------  IN: 2466.5 mL / OUT: 1236 mL / NET: 1230.5 mL        Mode: AC/ CMV (Assist Control/ Continuous Mandatory Ventilation)  RR (machine): 32  FiO2: 100  PEEP: 5  ITime: 0.8  MAP: 19  PC: 34  PIP: 39        LABS:  CBC Full  -  ( 15 Feb 2021 04:01 )  WBC Count : 39.35 K/uL  RBC Count : 2.12 M/uL  Hemoglobin : 6.4 g/dL  Hematocrit : 20.1 %  Platelet Count - Automated : 143 K/uL  Mean Cell Volume : 94.8 fl  Mean Cell Hemoglobin : 30.2 pg  Mean Cell Hemoglobin Concentration : 31.8 gm/dL  Auto Neutrophil # : x  Auto Lymphocyte # : x  Auto Monocyte # : x  Auto Eosinophil # : x  Auto Basophil # : x  Auto Neutrophil % : x  Auto Lymphocyte % : x  Auto Monocyte % : x  Auto Eosinophil % : x  Auto Basophil % : x    02-15    134<L>  |  101  |  42.0<H>  ----------------------------<  103<H>  4.9   |  23.0  |  1.26    Ca    8.0<L>      15 Feb 2021 04:01  Phos  6.5     02-15  Mg     2.5     -15    TPro  4.9<L>  /  Alb  2.1<L>  /  TBili  0.7  /  DBili  x   /  AST  60<H>  /  ALT  19  /  AlkPhos  78  02-15    PTT - ( 15 Feb 2021 04:01 )  PTT:86.1 sec      ____________________________________________________________________________________________________

## 2021-02-15 NOTE — PROGRESS NOTE ADULT - ASSESSMENT
HPI/INTERVAL EVENTS: received a unit of PRBC this AM for dropping Hb    EXAM:   Intubated, sedated, paralyzed  on CVVHD  reg rhythm  bilat air entry  abd soft  bilat edema    RADIOLOGY REVIEWED:  cxr- bilat diffuse airspace opacities, no PTX seen    IMPRESSION/ASSESSMENT & PLAN:   59 yo female with DM, HTN, admitted 1/25 with covid 19 viral pneumonia, now with acute respiratory failure/ARDS, shock, JER.    LLE DVT/ possible PE  received TPA 2/9  heparin drip    Acute respiratory failure  Completed decadron and remdesivir.  still requiring 100% fio2  - low vt ventilation  - started again on steroids - solu-medrol on 2/9, will slowly taper.  - meropenem for possible bacterial infection started on 2/9, duration per ID    JER/metabolic acidodis  - CVVHD    Distributive shock   - empiric abx  - cultures negative so far  - still requiring levophed    Anemia  - received 1 unit PRBC on 2/10  - another unit on 2/15    DM  - blood sugars well controlled on lantus and lispro    DVT proph: heparin drip  GI proph: pepcid  diet: tube feeds  sedation: prop/fentanyl  Prognosis guarded

## 2021-02-15 NOTE — PROGRESS NOTE ADULT - ASSESSMENT
1) ATN  2) Respiratory failure  3) COVID 19 RUTH  4) Anemia  5) metabolic acidosis    CVVHDF;  Orders in chart  PRBC x 1    dw Dr Berkowitz

## 2021-02-16 NOTE — PROVIDER CONTACT NOTE (CHANGE IN STATUS NOTIFICATION) - DATE AND TIME:
05-Feb-2021 14:20
06-Feb-2021 09:21
06-Feb-2021 15:39
06-Feb-2021 16:07
16-Feb-2021 10:37
16-Feb-2021 13:20
04-Feb-2021 13:32
04-Feb-2021 16:00
07-Feb-2021 10:44
07-Feb-2021 15:31
16-Feb-2021 18:56
11-Feb-2021 13:33

## 2021-02-16 NOTE — PROCEDURE NOTE - NSTRACHPOSTINTU_RESP_A_CORE
Appropriate capnography/Breath sounds bilateral/Breath sounds equal/Chest excursion noted/Chest X-Ray/Positive end tidal Co2 noted
Appropriate capnography

## 2021-02-16 NOTE — PROGRESS NOTE ADULT - PROVIDER SPECIALTY LIST ADULT
Critical Care
Hospitalist
Hospitalist
Infectious Disease
MICU
Critical Care
Hospitalist
Infectious Disease
Nephrology
Nephrology
Critical Care
Hospitalist
MICU
Nephrology
Critical Care
Hospitalist
Infectious Disease
Nephrology
Critical Care
Infectious Disease
Nephrology
Nephrology

## 2021-02-16 NOTE — PROGRESS NOTE ADULT - ASSESSMENT
1) ATN  2) Respiratory failure  3) COVID 19 RUTH  4) Anemia  5) metabolic acidosis    CVVHDF;  Orders in chart    dw Dr Martinez

## 2021-02-16 NOTE — PROCEDURE NOTE - PROCEDURE DATE TIME, MLM
16-Feb-2021 11:00
06-Feb-2021 17:58
06-Feb-2021 17:56
06-Feb-2021 17:00
16-Feb-2021 12:02
12-Feb-2021 17:03

## 2021-02-16 NOTE — CHART NOTE - NSCHARTNOTEFT_GEN_A_CORE
Called and spoke to patient's daughter Mi Jolley at [phone].  [place] deteriorating, children coming in to see her.

## 2021-02-16 NOTE — PROCEDURE NOTE - ADDITIONAL PROCEDURE DETAILS
ETT exchange over a bougie in setting of occluded ETT/airway 2/2 secretions/blood clots.      Not included in cc time.
Performed Independent of Critical Care time
Very thick, reddish- black, rock hard secretions were encountered in right and left mainstem bronchi, suctioned out as best as possible.

## 2021-02-16 NOTE — PROGRESS NOTE ADULT - SUBJECTIVE AND OBJECTIVE BOX
Patient is a 60y old  Female who presents with a chief complaint of SOB/COVID (15 Feb 2021 14:58)      BRIEF HOSPITAL COURSE: ***    Events last 24 hours: ***    PAST MEDICAL & SURGICAL HISTORY:  Hyperlipidemia    Liver disease  (unable to take tylenol )    Hypertension    Diabetes    Diverticulitis    High cholesterol    HTN (hypertension)    DM (diabetes mellitus)    H/O:       Allergies    Allergy Status Unknown  No Known Allergies    Intolerances      FAMILY HISTORY:  No pertinent family history in first degree relatives    No pertinent family history in first degree relatives        Social History:     Review of Systems:  CONSTITUTIONAL: No fever, chills, or fatigue  EYES: No eye pain, visual disturbances, or discharge  ENMT:  No difficulty hearing, tinnitus, vertigo; No sinus or throat pain  NECK: No pain or stiffness  RESPIRATORY: No cough, wheezing, chills or hemoptysis; No shortness of breath  CARDIOVASCULAR: No chest pain, palpitations, dizziness, or leg swelling  GASTROINTESTINAL: No abdominal or epigastric pain. No nausea, vomiting, or hematemesis; No diarrhea or constipation. No melena or hematochezia.  GENITOURINARY: No dysuria, frequency, hematuria, or incontinence  NEUROLOGICAL: No headaches, memory loss, loss of strength, numbness, or tremors  SKIN: No itching, burning, rashes, or lesions   MUSCULOSKELETAL: No joint pain or swelling; No muscle, back, or extremity pain  PSYCHIATRIC: No depression, anxiety, mood swings, or difficulty sleeping      Vitals During Exam:   HR:   BP:   RR:  sPO2:     Physical Examination:    General: No acute distress.      HEENT: Pupils equal, reactive to light.  Symmetric.    PULM: Clear to auscultation bilaterally, no significant sputum production    CVS: Regular rate and rhythm, no murmurs, rubs, or gallops    ABD: Soft, nondistended, nontender, normoactive bowel sounds, no masses    EXT: No edema, nontender    SKIN: Warm and well perfused, no rashes noted.    NEURO: Alert, oriented, interactive, nonfocal      Medications:  meropenem  IVPB 500 milliGRAM(s) IV Intermittent every 24 hours    midodrine 15 milliGRAM(s) Oral every 8 hours  norepinephrine Infusion 0.05 MICROgram(s)/kG/Min IV Continuous <Continuous>      acetaminophen   Tablet .. 650 milliGRAM(s) Oral every 6 hours PRN  cisatracurium Infusion 3 MICROgram(s)/kG/Min IV Continuous <Continuous>  fentaNYL   Infusion. 0.5 MICROgram(s)/kG/Hr IV Continuous <Continuous>  midazolam Infusion 0.02 mG/kG/Hr IV Continuous <Continuous>  propofol Infusion 10 MICROgram(s)/kG/Min IV Continuous <Continuous>      heparin   Injectable 6000 Unit(s) IV Push every 6 hours PRN  heparin   Injectable 3000 Unit(s) IV Push every 6 hours PRN  heparin  Infusion.  Unit(s)/Hr IV Continuous <Continuous>    pantoprazole  Injectable 40 milliGRAM(s) IV Push daily      dextrose 40% Gel 15 Gram(s) Oral once  dextrose 50% Injectable 25 Gram(s) IV Push once  dextrose 50% Injectable 25 Gram(s) IV Push once  glucagon  Injectable 1 milliGRAM(s) IntraMuscular once  insulin glargine Injectable (LANTUS) 10 Unit(s) SubCutaneous every morning  insulin lispro (ADMELOG) corrective regimen sliding scale   SubCutaneous every 6 hours    albumin human 25% IVPB 100 milliLiter(s) IV Intermittent once  dextrose 5%. 1000 milliLiter(s) IV Continuous <Continuous>  dextrose 5%. 1000 milliLiter(s) IV Continuous <Continuous>      chlorhexidine 0.12% Liquid 15 milliLiter(s) Oral Mucosa every 12 hours  chlorhexidine 2% Cloths 1 Application(s) Topical <User Schedule>    CRRT Treatment    <Continuous>  Phoxillum Filtration BK 4 / 2.5 5000 milliLiter(s) CRRT <Continuous>  Phoxillum Filtration BK 4 / 2.5 5000 milliLiter(s) CRRT <Continuous>  Phoxillum Filtration BK 4 / 2.5 5000 milliLiter(s) CRRT <Continuous>      Mode: AC/ CMV (Assist Control/ Continuous Mandatory Ventilation)  RR (machine): 30  FiO2: 100  PEEP: 8  ITime: 0.9  MAP: 23  PC: 30  PIP: 43      ICU Vital Signs Last 24 Hrs  T(C): 32.6 (2021 08:14), Max: 36.6 (15 Feb 2021 19:00)  T(F): 90.6 (2021 08:14), Max: 97.9 (15 Feb 2021 19:00)  HR: 66 (2021 08:20) (58 - 95)  BP: --  BP(mean): --  ABP: 156/74 (2021 08:20) (93/41 - 156/74)  ABP(mean): 108 (2021 08:20) (62 - 108)  RR: 30 (2021 08:20) (30 - 32)  SpO2: 86% (2021 08:20) (86% - 99%)    Vital Signs Last 24 Hrs  T(C): 32.6 (2021 08:14), Max: 36.6 (15 Feb 2021 19:00)  T(F): 90.6 (2021 08:14), Max: 97.9 (15 Feb 2021 19:00)  HR: 66 (2021 08:20) (58 - 95)  BP: --  BP(mean): --  RR: 30 (2021 08:20) (30 - 32)  SpO2: 86% (2021 08:20) (86% - 99%)    ABG - ( 2021 03:26 )  pH, Arterial: 7.32  pH, Blood: x     /  pCO2: 48    /  pO2: 54    / HCO3: 23    / Base Excess: -1.3  /  SaO2: 89                  I&O's Detail    15 Feb 2021 07:01  -  2021 07:00  --------------------------------------------------------  IN:    Cisatracurium: 323.7 mL    Enteral Tube Flush: 80 mL    FentaNYL: 434.4 mL    Heparin Infusion: 168 mL    IV PiggyBack: 50 mL    Norepinephrine: 220.4 mL    Other (mL): 504 mL    PRBCs (Packed Red Blood Cells): 300 mL    Propofol: 363.6 mL  Total IN: 2444.1 mL    OUT:    Glucerna 1.5: 0 mL    Indwelling Catheter - Urethral (mL): 37 mL    Other (mL): 1479 mL  Total OUT: 1516 mL    Total NET: 928.1 mL      2021 07:01  -  2021 09:49  --------------------------------------------------------  IN:    Cisatracurium: 15.1 mL    FentaNYL: 18 mL    Heparin Infusion: 7 mL    Midazolam: 1.4 mL    Propofol: 15.1 mL  Total IN: 56.6 mL    OUT:    Glucerna 1.5: 0 mL    Indwelling Catheter - Urethral (mL): 2 mL    Norepinephrine: 0 mL    Oral Fluid: 0 mL  Total OUT: 2 mL    Total NET: 54.6 mL            LABS:                        7.8    43.71 )-----------( 153      ( 2021 03:37 )             24.2     02-16    133<L>  |  102  |  30.0<H>  ----------------------------<  75  5.2   |  20.0<L>  |  0.84    Ca    7.5<L>      2021 03:37  Phos  5.4     02-16  Mg     2.5     02-16    TPro  4.9<L>  /  Alb  2.1<L>  /  TBili  0.7  /  DBili  x   /  AST  60<H>  /  ALT  19  /  AlkPhos  78  02-15        CAPILLARY BLOOD GLUCOSE  POCT Blood Glucose.: 185 mg/dL (2021 05:58)      PTT - ( 2021 03:37 )  PTT:91.0 sec      CULTURES:        RADIOLOGY:         SUPPLEMENTAL O2:   LINES:  IVF:  KWOK:   PPx:   CONTACT: Patient is a 60y old  Female who presents with a chief complaint of SOB/COVID (15 Feb 2021 14:58)      BRIEF HOSPITAL COURSE:   59 yo f pmhx HTN, HLD, DM2 admitted  with covid 19.     2/3: upgraded to MICU  : Intubated for worsening hypoxia, deteriorated to shock/ARDS, started on paralytic therapy   : worsening shock/hypoxia, concern for PE, received TPA  2/10: anemia, received 1U PRBC, started on meropenem  : worsening renal function, started on intermittent HD  : unstable on conventional HD, transitioned to CVVHD.    2/15: Anemic, 1U PRBC    Events last 24 hours:   Thick secretions, desatting now maintain spo2 in the 70s, plugging ETT.  Mucomyst via ETT x2 with minimal improvement.  Tentative plan for bronch.        PAST MEDICAL & SURGICAL HISTORY:  Hyperlipidemia  Liver disease  (unable to take tylenol )  Hypertension  Diabetes  Diverticulitis  High cholesterol  HTN (hypertension)  DM (diabetes mellitus  H/O:       Allergies  Allergy Status Unknown  No Known Allergies      FAMILY HISTORY:  No pertinent family history in first degree relatives  No pertinent family history in first degree relatives      Social History:   From home       Review of Systems:  Unable to obtain 2/2 intubated/sedated/paralytic therapy      Physical Examination:    General: Adult female, lying in bed, NAD    HEENT: NC/AT, ETT and OGT in place. Left IJ CVC, Right IJ HD cath    PULM: Symmetrical thorax expansion upon respiration.  Coarse to auscultation bilaterally, thick blood tinged secretions appreciated via ett suctioning     CVS: Sinus tach, no murmurs, rubs, or gallops appreciated    ABD: Soft, nondistended, nontender, normoactive bowel sounds, no masses appreciated    EXT: +generalized edema, left axillary gabriela    SKIN: Warm and well perfused, no rashes noted.    NEURO: Sedated and on paralytic therapy       Medications:  meropenem  IVPB 500 milliGRAM(s) IV Intermittent every 24 hours  midodrine 15 milliGRAM(s) Oral every 8 hours  norepinephrine Infusion 0.05 MICROgram(s)/kG/Min IV Continuous <Continuous>  acetaminophen   Tablet .. 650 milliGRAM(s) Oral every 6 hours PRN  cisatracurium Infusion 3 MICROgram(s)/kG/Min IV Continuous <Continuous>  fentaNYL   Infusion. 0.5 MICROgram(s)/kG/Hr IV Continuous <Continuous>  midazolam Infusion 0.02 mG/kG/Hr IV Continuous <Continuous>  propofol Infusion 10 MICROgram(s)/kG/Min IV Continuous <Continuous>  heparin   Injectable 6000 Unit(s) IV Push every 6 hours PRN  heparin   Injectable 3000 Unit(s) IV Push every 6 hours PRN  heparin  Infusion.  Unit(s)/Hr IV Continuous <Continuous>  pantoprazole  Injectable 40 milliGRAM(s) IV Push daily  dextrose 40% Gel 15 Gram(s) Oral once  dextrose 50% Injectable 25 Gram(s) IV Push once  dextrose 50% Injectable 25 Gram(s) IV Push once  glucagon  Injectable 1 milliGRAM(s) IntraMuscular once  insulin glargine Injectable (LANTUS) 10 Unit(s) SubCutaneous every morning  insulin lispro (ADMELOG) corrective regimen sliding scale   SubCutaneous every 6 hours  albumin human 25% IVPB 100 milliLiter(s) IV Intermittent once  dextrose 5%. 1000 milliLiter(s) IV Continuous <Continuous>  dextrose 5%. 1000 milliLiter(s) IV Continuous <Continuous>  chlorhexidine 0.12% Liquid 15 milliLiter(s) Oral Mucosa every 12 hours  chlorhexidine 2% Cloths 1 Application(s) Topical <User Schedule>      CRRT Treatment    <Continuous>  Phoxillum Filtration BK 4 / 2.5 5000 milliLiter(s) CRRT <Continuous>  Phoxillum Filtration BK 4 / 2.5 5000 milliLiter(s) CRRT <Continuous>  Phoxillum Filtration BK 4 / 2.5 5000 milliLiter(s) CRRT <Continuous>      Mode: AC/ CMV (Assist Control/ Continuous Mandatory Ventilation)  RR (machine): 30  FiO2: 100  PEEP: 8  ITime: 0.9  MAP: 23  PC: 30  PIP: 43      ICU Vital Signs Last 24 Hrs  T(C): 32.6 (2021 08:14), Max: 36.6 (15 Feb 2021 19:00)  T(F): 90.6 (2021 08:14), Max: 97.9 (15 Feb 2021 19:00)  HR: 66 (2021 08:20) (58 - 95)  BP: --  BP(mean): --  ABP: 156/74 (2021 08:20) (93/41 - 156/74)  ABP(mean): 108 (2021 08:20) (62 - 108)  RR: 30 (2021 08:20) (30 - 32)  SpO2: 86% (2021 08:20) (86% - 99%)    Vital Signs Last 24 Hrs  T(C): 32.6 (2021 08:14), Max: 36.6 (15 Feb 2021 19:00)  T(F): 90.6 (2021 08:14), Max: 97.9 (15 Feb 2021 19:00)  HR: 66 (2021 08:20) (58 - 95)  BP: --  BP(mean): --  RR: 30 (2021 08:20) (30 - 32)  SpO2: 86% (2021 08:20) (86% - 99%)    ABG - ( 2021 03:26 )  pH, Arterial: 7.32  pH, Blood: x     /  pCO2: 48    /  pO2: 54    / HCO3: 23    / Base Excess: -1.3  /  SaO2: 89          I&O's Detail    15 Feb 2021 07:01  -  2021 07:00  --------------------------------------------------------  IN:    Cisatracurium: 323.7 mL    Enteral Tube Flush: 80 mL    FentaNYL: 434.4 mL    Heparin Infusion: 168 mL    IV PiggyBack: 50 mL    Norepinephrine: 220.4 mL    Other (mL): 504 mL    PRBCs (Packed Red Blood Cells): 300 mL    Propofol: 363.6 mL  Total IN: 2444.1 mL    OUT:    Glucerna 1.5: 0 mL    Indwelling Catheter - Urethral (mL): 37 mL    Other (mL): 1479 mL  Total OUT: 1516 mL  Total NET: 928.1 mL      2021 07:01  -  2021 09:49  --------------------------------------------------------  IN:    Cisatracurium: 15.1 mL    FentaNYL: 18 mL    Heparin Infusion: 7 mL    Midazolam: 1.4 mL    Propofol: 15.1 mL  Total IN: 56.6 mL    OUT:    Glucerna 1.5: 0 mL    Indwelling Catheter - Urethral (mL): 2 mL    Norepinephrine: 0 mL    Oral Fluid: 0 mL  Total OUT: 2 mL  Total NET: 54.6 mL      LABS:                        7.8    43.71 )-----------( 153      ( 2021 03:37 )             24.2     02-16    133<L>  |  102  |  30.0<H>  ----------------------------<  75  5.2   |  20.0<L>  |  0.84    Ca    7.5<L>      2021 03:37  Phos  5.4     02-16  Mg     2.5     02-16    TPro  4.9<L>  /  Alb  2.1<L>  /  TBili  0.7  /  DBili  x   /  AST  60<H>  /  ALT  19  /  AlkPhos  78  02-15        CAPILLARY BLOOD GLUCOSE  POCT Blood Glucose.: 185 mg/dL (2021 05:58)      PTT - ( 2021 03:37 )  PTT:91.0 sec      CULTURES:  Blood cx  pending      RADIOLOGY:   < from: Xray Chest 1 View- PORTABLE-Urgent (Xray Chest 1 View- PORTABLE-Urgent .) (02.15.21 @ 07:13) >   EXAM:  XR CHEST PORTABLE URGENT 1V                          PROCEDURE DATE:  02/15/2021      INTERPRETATION:  XR CHEST URGENT. One view.    INDICATION: Shortness of Breath    COMPARISON: 2021    FINDINGS/  IMPRESSION:    Endotracheal tubeabove the matt. Enteric tube within the stomach. Tip of the right IJ and left IJ central lines within SVC.    Diffuse patchy opacities asymmetric to the right are increased. No pneumothorax.    MUSA WALKER MD; Attending Radiologist  This document has been electronically signed. Feb 15 2021  3:06PM    < end of copied text >      SUPPLEMENTAL O2: Pressure Control   LINES: cvc, hd cath, gabriela  IVF: N  KWOK: Y  PPx: Heparin gtt, PPI  CONTACT: Y, COVID 19

## 2021-02-16 NOTE — PROGRESS NOTE ADULT - ASSESSMENT
59 yo f pmhx HTN, HLD, DM2 admitted 1/25 with covid 19 with course complicated by worsening hypoxia requiring intubation, ARDS, shock, pe/dvt s/p tpa, jer requiring CVVHD.      NEURO: Sedated on propofol and fentanyl gtt.  Triglycerides >400 this am, versed gtt ordered, plan to wean propofol off.  Nimbex for paralytic therapy.   CV: Shock stat requiring vasopressor therapy, actively titrating levophed for MAP >65, weaning as tolerated.  Midodrine to aid in weaning pressors. Poor nutritional status, edematous, third spacing, Albumin for additional support.    RESP: ARDS in setting of COVID 19, Pressure control, Fio2 100%.  Patient continuously clotting off ETT despite, aggressive suctioning/lavaging/mucomyst.  Plan for bronch this am.    RENAL: JER requiring CVVHD, HD dose meds, avoid nephrotoxic meds, trend urine output, bun/cr and electrolytes.  Cho in place   GI: NPO, no tf in setting of paralytic therapy   ENDO: Hypoglycemic this am, Lantus held.  Remains on ISS.    ID: Covid, completed remdesivir/decadron therapy.  On meropenem #7 for empiric coverage.  ID following.  Blood cx sent 2/14, pending.   HEME: Heparin gtt for ac in setting of DVT/PE.   DISPO: Full code.     Critical Care time: 45 mins assessing presenting problems of acute illness that poses high probability of life threatening deterioration or end organ damage/dysfunction.  Medical decision making including Initiating plan of care, reviewing data, reviewing radiology, discussing with multidisciplinary team, non inclusive of procedures, discussing goals of care with patient/family

## 2021-02-16 NOTE — PROVIDER CONTACT NOTE (CHANGE IN STATUS NOTIFICATION) - NAME OF MD/NP/PA/DO NOTIFIED:
ALEIDA CYR
ALEIDA Haddad
ALEIDA Haddad
ALEIDA VERNON
ALEIDA Haddad
ALEIDA Pimentel
ALEIDA VERNON
Dr Haddad
Dr gray
SUZE CUEVAS
ALEIDA Haddad
ALEIDA Fletcher

## 2021-02-16 NOTE — PROVIDER CONTACT NOTE (CHANGE IN STATUS NOTIFICATION) - SITUATION
pt complaining of midsternal chest pain 7/10
elevated HR is now in 140's
Pt complaining of 10/10 chest pain and pressure, nonradiating
desaturation to 77%, sustaining mostly in 79-80% post bronchoscopy and reintubation
pt repositioned and turned, after turn, pt now de-satting 87-88%, tachycardic 110s, SBP 170s, pressors turned off
pt with decreased urine output over last few hours, pt ST overnight and this AM -125
pt's desat to 79&, on FiO2 of 100 after removed mucous plug, now at 1040 at 77%
O2 sats 79-81
Pt agitated and anxious, O2 sats 84-88% on hi flow and nonrebreather, pt refusing bipap at this time.
Pt remains in sinus tach with -120s and has minimal urine output, 2-8ml/hr.
Pt with low BP, SBP 80s, MAP 54-60 overnight, low urine output overnight
pt with O2 sats 83-85%

## 2021-02-16 NOTE — PROGRESS NOTE ADULT - ATTENDING COMMENTS
61 yo female with DM, HTN, admitted 1/25 with covid 19 viral pneumonia, now with acute respiratory failure/ARDS, shock, JER requiring CVVHD, LLE DVT now on heparin drip.    Today with worsening oxygenation, shock, appears to be dying.  Family notified, invited to come in.

## 2021-02-16 NOTE — PROGRESS NOTE ADULT - NUTRITIONAL ASSESSMENT
This patient has been assessed with a concern for Malnutrition and has been determined to have a diagnosis/diagnoses of Moderate protein-calorie malnutrition.    This patient is being managed with:   Diet NPO with Tube Feed-  Tube Feeding Modality: Orogastric  Glucerna 1.5 Sukhjinder (GLUCERNA1.5)  Total Volume for 24 Hours (mL): 1200  Continuous  Starting Tube Feed Rate {mL per Hour}: 10  Increase Tube Feed Rate by (mL): 10     Every 2 hours  Until Goal Tube Feed Rate (mL per Hour): 50  Tube Feed Duration (in Hours): 24  Tube Feed Start Time: 10:00  Entered: Feb 10 2021  9:47AM      This patient has been assessed with a concern for Malnutrition and has been determined to have a diagnosis/diagnoses of Moderate protein-calorie malnutrition.    This patient is being managed with:   Diet NPO with Tube Feed-  Tube Feeding Modality: Orogastric  Glucerna 1.5 Sukhjinder (GLUCERNA1.5)  Total Volume for 24 Hours (mL): 1200  Continuous  Starting Tube Feed Rate {mL per Hour}: 10  Increase Tube Feed Rate by (mL): 10     Every 2 hours  Until Goal Tube Feed Rate (mL per Hour): 50  Tube Feed Duration (in Hours): 24  Tube Feed Start Time: 10:00  Entered: Feb 10 2021  9:47AM    
This patient has been assessed with a concern for Malnutrition and has been determined to have a diagnosis/diagnoses of Moderate protein-calorie malnutrition.    This patient is being managed with:   Diet NPO with Tube Feed-  Tube Feeding Modality: Orogastric  Glucerna 1.5 Sukhjinder (GLUCERNA1.5)  Total Volume for 24 Hours (mL): 1200  Continuous  Starting Tube Feed Rate {mL per Hour}: 10  Increase Tube Feed Rate by (mL): 10     Every 2 hours  Until Goal Tube Feed Rate (mL per Hour): 50  Tube Feed Duration (in Hours): 24  Tube Feed Start Time: 10:00  Entered: Feb 10 2021  9:47AM    

## 2021-02-16 NOTE — PROCEDURE NOTE - NSPROCDETAILS_GEN_ALL_CORE
location identified, draped/prepped, sterile technique used, needle inserted/introduced/positive blood return obtained via catheter/connected to a pressurized flush line/sutured in place/Seldinger technique
guidewire recovered/lumen(s) aspirated and flushed/sterile dressing applied/sterile technique, catheter placed/ultrasound guidance with use of sterile gel and probe cove
patient pre-oxygenated, tube inserted, placement confirmed
guidewire recovered/lumen(s) aspirated and flushed/sterile dressing applied/sterile technique, catheter placed/ultrasound guidance with use of sterile gel and probe cove
patient pre-oxygenated, tube inserted, placement confirmed

## 2021-02-16 NOTE — PROCEDURE NOTE - NSPROCNAME_GEN_A_CORE
Tracheal Intubation
General
Tracheal Intubation
Arterial Puncture/Cannulation
Central Line Insertion
Central Line Insertion

## 2021-02-16 NOTE — PROGRESS NOTE ADULT - REASON FOR ADMISSION
SOB/COVID

## 2021-02-16 NOTE — CHART NOTE - NSCHARTNOTESELECT_GEN_ALL_CORE
FAMILY UPDATE/Event Note
Nutrition Services
eval pain/Event Note
Event Note
Family update/Event Note
Nutrition Services
Nutrition Services

## 2021-02-16 NOTE — PROCEDURE NOTE - NSINFORMCONSENT_GEN_A_CORE
daughter/Benefits, risks, and possible complications of procedure explained to patient/caregiver who verbalized understanding and gave verbal consent.
This was an emergent procedure.
from oneyda Headley/Benefits, risks, and possible complications of procedure explained to patient/caregiver who verbalized understanding and gave verbal consent.
This was an emergent procedure.
Benefits, risks, and possible complications of procedure explained to patient/caregiver who verbalized understanding and gave verbal consent.
from daughter at bedside/Benefits, risks, and possible complications of procedure explained to patient/caregiver who verbalized understanding and gave verbal consent.

## 2021-02-16 NOTE — PROGRESS NOTE ADULT - SUBJECTIVE AND OBJECTIVE BOX
[facility] DIVISION OF KIDNEY DISEASES AND HYPERTENSION -- FOLLOW UP NOTE  --------------------------------------------------------------------------------  Chief Complaint:  ATN  24 hour events/subjective:  Seen/examined this AM on HD; reevaluated      PAST HISTORY  --------------------------------------------------------------------------------  No significant changes to PMH, PSH, FHx, SHx, unless otherwise noted    ALLERGIES & MEDICATIONS  --------------------------------------------------------------------------------  Allergies    Allergy Status Unknown  No Known Allergies    Intolerances      Standing Inpatient Medications  albumin human 25% IVPB 100 milliLiter(s) IV Intermittent once  chlorhexidine 0.12% Liquid 15 milliLiter(s) Oral Mucosa every 12 hours  chlorhexidine 2% Cloths 1 Application(s) Topical <User Schedule>  cisatracurium Infusion 3 MICROgram(s)/kG/Min IV Continuous <Continuous>  CRRT Treatment    <Continuous>  dextrose 40% Gel 15 Gram(s) Oral once  dextrose 5%. 1000 milliLiter(s) IV Continuous <Continuous>  dextrose 5%. 1000 milliLiter(s) IV Continuous <Continuous>  dextrose 50% Injectable 25 Gram(s) IV Push once  dextrose 50% Injectable 25 Gram(s) IV Push once  fentaNYL   Infusion. 0.5 MICROgram(s)/kG/Hr IV Continuous <Continuous>  glucagon  Injectable 1 milliGRAM(s) IntraMuscular once  heparin  Infusion.  Unit(s)/Hr IV Continuous <Continuous>  insulin glargine Injectable (LANTUS) 10 Unit(s) SubCutaneous every morning  insulin lispro (ADMELOG) corrective regimen sliding scale   SubCutaneous every 6 hours  meropenem  IVPB 500 milliGRAM(s) IV Intermittent every 24 hours  midazolam Infusion 0.02 mG/kG/Hr IV Continuous <Continuous>  midodrine 15 milliGRAM(s) Oral every 8 hours  norepinephrine Infusion 0.05 MICROgram(s)/kG/Min IV Continuous <Continuous>  pantoprazole  Injectable 40 milliGRAM(s) IV Push daily  Phoxillum Filtration BK 4 / 2.5 5000 milliLiter(s) CRRT <Continuous>  Phoxillum Filtration BK 4 / 2.5 5000 milliLiter(s) CRRT <Continuous>  Phoxillum Filtration BK 4 / 2.5 5000 milliLiter(s) CRRT <Continuous>  propofol Infusion 10 MICROgram(s)/kG/Min IV Continuous <Continuous>    PRN Inpatient Medications  acetaminophen   Tablet .. 650 milliGRAM(s) Oral every 6 hours PRN  heparin   Injectable 6000 Unit(s) IV Push every 6 hours PRN  heparin   Injectable 3000 Unit(s) IV Push every 6 hours PRN      REVIEW OF SYSTEMS  --------------------------------------------------------------------------------  unable to obtain    VITALS/PHYSICAL EXAM  --------------------------------------------------------------------------------  T(C): 32.6 (02-16-21 @ 08:14), Max: 36.6 (02-15-21 @ 19:00)  HR: 100 (02-16-21 @ 12:00) (58 - 100)  BP: --  RR: 30 (02-16-21 @ 12:00) (30 - 30)  SpO2: 81% (02-16-21 @ 12:00) (79% - 99%)  Wt(kg): --    Weight (kg): 72 (02-15-21 @ 08:30)      02-15-21 @ 07:01  -  02-16-21 @ 07:00  --------------------------------------------------------  IN: 2444.1 mL / OUT: 1516 mL / NET: 928.1 mL    02-16-21 @ 07:01  -  02-16-21 @ 12:08  --------------------------------------------------------  IN: 318.4 mL / OUT: 2 mL / NET: 316.4 mL      Physical Exam:  		Gen: int sed  	HEENT: ETT  	Pulm: vent BS  	CV: RRR, S1S2;   	Back: No spinal or CVA tenderness; no sacral edema  	Abd: +BS, soft, nontender/nondistended  	: No suprapubic tenderness  	UE: Warm,  +edema;   	LE: Warm, +edema  	Neuro:sed  	Psych: sed  	Skin: Warm, without rashes  LABS/STUDIES  --------------------------------------------------------------------------------              7.8    43.71 >-----------<  153      [02-16-21 @ 03:37]              24.2     133  |  102  |  30.0  ----------------------------<  75      [02-16-21 @ 03:37]  5.2   |  20.0  |  0.84        Ca     7.5     [02-16-21 @ 03:37]      Mg     2.5     [02-16-21 @ 03:37]      Phos  5.4     [02-16-21 @ 03:37]    TPro  4.9  /  Alb  2.1  /  TBili  0.7  /  DBili  x   /  AST  60  /  ALT  19  /  AlkPhos  78  [02-15-21 @ 04:01]      PTT: 91.0       [02-16-21 @ 03:37]      Creatinine Trend:  SCr 0.84 [02-16 @ 03:37]  SCr 0.95 [02-15 @ 22:38]  SCr 1.11 [02-15 @ 16:48]  SCr 1.15 [02-15 @ 10:18]  SCr 1.26 [02-15 @ 04:01]    Urinalysis - [01-28-21 @ 20:13]      Color Yellow / Appearance Slightly Turbid / SG 1.015 / pH 6.0      Gluc 250 / Ketone Negative  / Bili Negative / Urobili Negative       Blood Small / Protein 30 / Leuk Est Negative / Nitrite Negative      RBC 0-2 / WBC 3-5 / Hyaline  / Gran  / Sq Epi  / Non Sq Epi Occasional / Bacteria Many      Iron 16, TIBC 320, %sat 5      [01-26-21 @ 07:57]  Ferritin 2568      [02-09-21 @ 18:43]  Lipid: chol --, , HDL --, LDL --      [02-16-21 @ 03:37]    HBsAb 10.5      [02-13-21 @ 03:42]  HBsAg Nonreact      [02-13-21 @ 03:42]  HBcAb Nonreact      [02-13-21 @ 03:42]  HCV 0.08, Nonreact      [01-26-21 @ 13:28]

## 2021-02-17 NOTE — DISCHARGE NOTE FOR THE EXPIRED PATIENT - HOSPITAL COURSE
61 yo f pmhx HTN, HLD, DM2 admitted 1/25 with covid 19 with admitted on 1/25 to medicine, had decline in respiratory status and was admitted to the ICU 2/3 and was intubated on 2/6 course complicated by worsening hypoxia requiring intubation, ARDS, shock, pe/dvt s/p tpa, JER requiring CVVHD.  Today, pt acutely desaturating on max vent settings.  Patient continuously clotting off ETT despite, aggressive suctioning/lavaging/mucomyst. Bronchoscopy performed at bedside on 2/16, pt having thick clots - Very thick, reddish- black, rock hard secretions were encountered in right and left mainstem bronchi, suctioned out as best as possible. Pt's tube was also exchanged. SpO2 coming up to no higher than 80-85% - pt's family was made aware of events today and that pt was declining. They came into see her earlier during the day.      ON pt began to drop her Spo2 acutely into 60's then subsequently began to have rhythm changes with pauses, family was called immediately- still wanted pt to remain full code and dull medically managed, within 6-10 minutes of desaturation pt lost pulse and went into asystolic arrest. CPR was performed s/p 3 epis and 1 bicarb ~15 min coding pt still in asystole.     Code was called at that time as continuing in this setting would be medically futile given high mortality and profound hypoxia. Pt was examined at the bedside- asystole on monitor, no palpable pulses at carotid and femoral locations, the ventilator was disconnected, no spontaneous breath sounds were auscultated via stethoscope. There were no heart sounds auscultated via stethoscope at left and right sternal boarders as well at PMI, skin was cyanotic and cool and pupils were fixed and dilated without reaction to light. .  Pt was pronounced dead 01:02 2/17/21    Family called and notified - spoke with daughter Mi Billingsley came to visit and see her at bedside

## 2021-02-19 LAB
CULTURE RESULTS: SIGNIFICANT CHANGE UP
CULTURE RESULTS: SIGNIFICANT CHANGE UP
SPECIMEN SOURCE: SIGNIFICANT CHANGE UP
SPECIMEN SOURCE: SIGNIFICANT CHANGE UP

## 2021-03-16 PROCEDURE — 71045 X-RAY EXAM CHEST 1 VIEW: CPT

## 2021-03-16 PROCEDURE — 82947 ASSAY GLUCOSE BLOOD QUANT: CPT

## 2021-03-16 PROCEDURE — 99261: CPT

## 2021-03-16 PROCEDURE — 85379 FIBRIN DEGRADATION QUANT: CPT

## 2021-03-16 PROCEDURE — 82803 BLOOD GASES ANY COMBINATION: CPT

## 2021-03-16 PROCEDURE — 36430 TRANSFUSION BLD/BLD COMPNT: CPT

## 2021-03-16 PROCEDURE — 84484 ASSAY OF TROPONIN QUANT: CPT

## 2021-03-16 PROCEDURE — 86850 RBC ANTIBODY SCREEN: CPT

## 2021-03-16 PROCEDURE — 86705 HEP B CORE ANTIBODY IGM: CPT

## 2021-03-16 PROCEDURE — 82330 ASSAY OF CALCIUM: CPT

## 2021-03-16 PROCEDURE — 85018 HEMOGLOBIN: CPT

## 2021-03-16 PROCEDURE — 86704 HEP B CORE ANTIBODY TOTAL: CPT

## 2021-03-16 PROCEDURE — 80076 HEPATIC FUNCTION PANEL: CPT

## 2021-03-16 PROCEDURE — 86923 COMPATIBILITY TEST ELECTRIC: CPT

## 2021-03-16 PROCEDURE — 83605 ASSAY OF LACTIC ACID: CPT

## 2021-03-16 PROCEDURE — 84295 ASSAY OF SERUM SODIUM: CPT

## 2021-03-16 PROCEDURE — 36415 COLL VENOUS BLD VENIPUNCTURE: CPT

## 2021-03-16 PROCEDURE — 93005 ELECTROCARDIOGRAM TRACING: CPT

## 2021-03-16 PROCEDURE — 83540 ASSAY OF IRON: CPT

## 2021-03-16 PROCEDURE — 83735 ASSAY OF MAGNESIUM: CPT

## 2021-03-16 PROCEDURE — 86901 BLOOD TYPING SEROLOGIC RH(D): CPT

## 2021-03-16 PROCEDURE — 82553 CREATINE MB FRACTION: CPT

## 2021-03-16 PROCEDURE — 82728 ASSAY OF FERRITIN: CPT

## 2021-03-16 PROCEDURE — 93306 TTE W/DOPPLER COMPLETE: CPT

## 2021-03-16 PROCEDURE — 85610 PROTHROMBIN TIME: CPT

## 2021-03-16 PROCEDURE — P9016: CPT

## 2021-03-16 PROCEDURE — 82435 ASSAY OF BLOOD CHLORIDE: CPT

## 2021-03-16 PROCEDURE — 92950 HEART/LUNG RESUSCITATION CPR: CPT

## 2021-03-16 PROCEDURE — 85025 COMPLETE CBC W/AUTO DIFF WBC: CPT

## 2021-03-16 PROCEDURE — 84145 PROCALCITONIN (PCT): CPT

## 2021-03-16 PROCEDURE — P9047: CPT

## 2021-03-16 PROCEDURE — 87340 HEPATITIS B SURFACE AG IA: CPT

## 2021-03-16 PROCEDURE — 85014 HEMATOCRIT: CPT

## 2021-03-16 PROCEDURE — 80048 BASIC METABOLIC PNL TOTAL CA: CPT

## 2021-03-16 PROCEDURE — 99285 EMERGENCY DEPT VISIT HI MDM: CPT | Mod: 25

## 2021-03-16 PROCEDURE — 87637 SARSCOV2&INF A&B&RSV AMP PRB: CPT

## 2021-03-16 PROCEDURE — 84100 ASSAY OF PHOSPHORUS: CPT

## 2021-03-16 PROCEDURE — 86706 HEP B SURFACE ANTIBODY: CPT

## 2021-03-16 PROCEDURE — 85730 THROMBOPLASTIN TIME PARTIAL: CPT

## 2021-03-16 PROCEDURE — 94002 VENT MGMT INPAT INIT DAY: CPT

## 2021-03-16 PROCEDURE — 84478 ASSAY OF TRIGLYCERIDES: CPT

## 2021-03-16 PROCEDURE — 82565 ASSAY OF CREATININE: CPT

## 2021-03-16 PROCEDURE — C8929: CPT

## 2021-03-16 PROCEDURE — 87040 BLOOD CULTURE FOR BACTERIA: CPT

## 2021-03-16 PROCEDURE — 83615 LACTATE (LD) (LDH) ENZYME: CPT

## 2021-03-16 PROCEDURE — 84132 ASSAY OF SERUM POTASSIUM: CPT

## 2021-03-16 PROCEDURE — 36000 PLACE NEEDLE IN VEIN: CPT

## 2021-03-16 PROCEDURE — 81001 URINALYSIS AUTO W/SCOPE: CPT

## 2021-03-16 PROCEDURE — 80069 RENAL FUNCTION PANEL: CPT

## 2021-03-16 PROCEDURE — 86803 HEPATITIS C AB TEST: CPT

## 2021-03-16 PROCEDURE — 0225U NFCT DS DNA&RNA 21 SARSCOV2: CPT

## 2021-03-16 PROCEDURE — 94660 CPAP INITIATION&MGMT: CPT

## 2021-03-16 PROCEDURE — 82550 ASSAY OF CK (CPK): CPT

## 2021-03-16 PROCEDURE — 80053 COMPREHEN METABOLIC PANEL: CPT

## 2021-03-16 PROCEDURE — 85027 COMPLETE CBC AUTOMATED: CPT

## 2021-03-16 PROCEDURE — 82962 GLUCOSE BLOOD TEST: CPT

## 2021-03-16 PROCEDURE — 71275 CT ANGIOGRAPHY CHEST: CPT

## 2021-03-16 PROCEDURE — 84466 ASSAY OF TRANSFERRIN: CPT

## 2021-03-16 PROCEDURE — 83036 HEMOGLOBIN GLYCOSYLATED A1C: CPT

## 2021-03-16 PROCEDURE — 83550 IRON BINDING TEST: CPT

## 2021-03-16 PROCEDURE — 94003 VENT MGMT INPAT SUBQ DAY: CPT

## 2021-03-16 PROCEDURE — 93970 EXTREMITY STUDY: CPT

## 2021-03-16 PROCEDURE — 86900 BLOOD TYPING SEROLOGIC ABO: CPT

## 2021-03-16 PROCEDURE — 94760 N-INVAS EAR/PLS OXIMETRY 1: CPT

## 2021-03-16 PROCEDURE — 86140 C-REACTIVE PROTEIN: CPT

## 2022-02-03 NOTE — PROGRESS NOTE ADULT - SUBJECTIVE AND OBJECTIVE BOX
Patient is a 60y old  Female who presents with a chief complaint of SOB/COVID (2021 12:39)      BRIEF HOSPITAL COURSE:  61 y/o female pmhx HTN, DM2 admitted on  w/ COVID-19 pneumonia. Hospital course complicated by acute hypoxic respiratory failure/ ARDS ( intubated on ), shock, JER and DVT, presumed PE s/p TPA.    Events last 24 hours: Increased Peak/ plat pressures >50 this am, lowered TV to 350 ( 6cc/kg) and PEEP 10 , airway pressures transiently improved to mid 40s. O2 sat 90-92%. Ultimately climbed back up to mid 50s, changed to pressure control now w/ airway pressure low 30s. Became hypotensive during HD, ordered for levophed and switched to CRRT. On 100% FiO2      PAST MEDICAL & SURGICAL HISTORY:  Hyperlipidemia    Liver disease  (unable to take tylenol )    Hypertension    Diabetes    Diverticulitis    High cholesterol    HTN (hypertension)    DM (diabetes mellitus)    H/O:           Hosp day #19d    Vent day #  Mode: AC/ CMV (Assist Control/ Continuous Mandatory Ventilation)  RR (machine): 34  TV (machine): 350  FiO2: 100  PEEP: 10  MAP: 20  PC: 35  PIP: 46        Vital signs / Reviewed and Physical Exam Performed where pertinent and urgently required    Lab / Radiology  studies / ABG / Meds -  reviewed and interpreted into the assessment and treatment plan.      Assessment/Plan/Therapeutic interventions      Assessment:  1. Acute hypoxic respiratory failure  2. ARDS  3. COVID-19 viral pneumonia  4. Shock  5. JER  6. DVT/ PE.     Neuro - Sedated on propofol and fentanyl Chemically paralyzed on nimbex.     CV -  Titrating levophed to maintain MAP >65.     Pulm -  ARDS-NET 4-6cc/kg IBW TV as able to maintain plateau pressures <30. Titrating FiO2 to maintain O2 sat >88%. Allow permissive hypercapnia. repeat ABG in am. Unable to titrate FiO2 down further today on 100% and PEEP10. Switched to Pressure control w/ improvement of airway pressures. Will repeat ABG  few hours.                Prone ventilation consideration as feasible  Pa02/Fi02 < 150 on Fi02 >60% and PEEP at least 5                 Vent bundle Reviewed . Solumderol 40mg q8hr. Finished course of Decadron/ Remdesivir.     GI -  Pepcid BID. Tolerating tube feeds         Renal - JER, Oliguric. Plan was for HD, became hypotensive. CRRT starting. BMP q6hr. Cho Catheter improving. Nephrology following.     Heme -  Full AC w/ heparin gtt for DVT. s/p TPA for suspected PE.  - Anemia, s/p 2 PRBC. H/H stable. No signs of bleeding.,     ID - On meropenum for suspected superimposed bacterial PNA. Cultures are negative to date. Rising leukocytosis. ID consult     Endo - Transitioned to lantus. Insulin sliding scale.           COVID 19 specific considerations and therapeutic  options based on the available and rapidly changing literature    Goals of care considerations:  Ongoing assessment for patient specific treatment options based on progression or decline.  I have involved the family with updates and requests in guidance for medical decision making.          38  Minutes of critical care tiem spent in the management of this critically ill COVID-19 patient/PUI patient with continuous assessments and interventions based on the interpretation of multiple databases.   Pt is resting in cot, laying down with lights off. No distress noted. Sitter at bedside. Will continue to monitor.       Eliezer Fletcher, [address] [facility]  02/03/22 [id number]

## 2023-05-05 NOTE — DIETITIAN INITIAL EVALUATION ADULT. - NUTRITION DIAGNOSIS
Lab Results   Component Value Date    HGB 9 6 (L) 05/04/2023    HGB 10 4 (L) 05/04/2023    HGB 10 1 (L) 05/04/2023    HGB 10 2 (L) 05/04/2023    HGB 11 0 (L) 05/03/2023    HGB 9 2 (L) 05/03/2023     Shock state resolved  · 2/2 kidney capsule rupture in the setting of coagulopathy on home Xarelto and ASA in route to ER  · Reversed with KCentra and DDAVP  · Total Blood products to date  · PRBCs 4 units  · FFP 4 units  · Transfuse for HGB < 7 or hemodynamic instability  · Required levophed for short period of time in IR -- weaned off prior to completion of procedure  · Maintain MAP > 65 mmHg yes...

## 2023-12-09 NOTE — CONSULT NOTE ADULT - PROBLEM/RECOMMENDATION-4
PT presents to ER with c.o productive cough with green sputum x1 week. Pt reports recent exposure to strep throat. Pt denies fevers. State hx of asthma and has been using inhaler q4h since yesterday with improvement of symptoms. Pt able to ambulate without increased dyspnea. Pt reports middle chest pain only when coughing.     DISPLAY PLAN FREE TEXT

## 2024-01-05 NOTE — ED PROVIDER NOTE - CADM POA PRESS ULCER
No [Numbness] : numbness [Tingling] : tingling [Negative] : Heme/Lymph [Arm Weakness] : no arm weakness [Hand Weakness] : no hand weakness [de-identified] : facial pain

## 2024-01-22 LAB
BASE EXCESS BLDA CALC-SCNC: -0.7 MMOL/L — SIGNIFICANT CHANGE UP (ref -3–3)
HCO3 BLDA-SCNC: 24 MMOL/L — SIGNIFICANT CHANGE UP (ref 20–26)
PCO2 BLDA: 54 MMHG — HIGH (ref 35–45)
PH BLDA: 7.29 — LOW (ref 7.35–7.45)
PO2 BLDA: 65 MMHG — LOW (ref 83–108)
SAO2 % BLDA: 93 % — LOW (ref 95–99)